# Patient Record
Sex: FEMALE | Race: WHITE | NOT HISPANIC OR LATINO | Employment: OTHER | URBAN - METROPOLITAN AREA
[De-identification: names, ages, dates, MRNs, and addresses within clinical notes are randomized per-mention and may not be internally consistent; named-entity substitution may affect disease eponyms.]

---

## 2017-01-03 ENCOUNTER — APPOINTMENT (OUTPATIENT)
Dept: PHYSICAL THERAPY | Facility: CLINIC | Age: 82
End: 2017-01-03
Payer: MEDICARE

## 2017-01-03 PROCEDURE — 97140 MANUAL THERAPY 1/> REGIONS: CPT

## 2017-01-06 ENCOUNTER — APPOINTMENT (OUTPATIENT)
Dept: PHYSICAL THERAPY | Facility: CLINIC | Age: 82
End: 2017-01-06
Payer: MEDICARE

## 2017-01-06 PROCEDURE — 97140 MANUAL THERAPY 1/> REGIONS: CPT

## 2017-01-10 ENCOUNTER — APPOINTMENT (OUTPATIENT)
Dept: PHYSICAL THERAPY | Facility: CLINIC | Age: 82
End: 2017-01-10
Payer: MEDICARE

## 2017-01-10 ENCOUNTER — ALLSCRIPTS OFFICE VISIT (OUTPATIENT)
Dept: OTHER | Facility: OTHER | Age: 82
End: 2017-01-10

## 2017-01-10 PROCEDURE — 97140 MANUAL THERAPY 1/> REGIONS: CPT

## 2017-01-13 ENCOUNTER — APPOINTMENT (OUTPATIENT)
Dept: PHYSICAL THERAPY | Facility: CLINIC | Age: 82
End: 2017-01-13
Payer: MEDICARE

## 2017-01-13 PROCEDURE — 97140 MANUAL THERAPY 1/> REGIONS: CPT

## 2017-01-17 ENCOUNTER — APPOINTMENT (OUTPATIENT)
Dept: PHYSICAL THERAPY | Facility: CLINIC | Age: 82
End: 2017-01-17
Payer: MEDICARE

## 2017-01-17 PROCEDURE — 97140 MANUAL THERAPY 1/> REGIONS: CPT

## 2017-01-20 ENCOUNTER — APPOINTMENT (OUTPATIENT)
Dept: PHYSICAL THERAPY | Facility: CLINIC | Age: 82
End: 2017-01-20
Payer: MEDICARE

## 2017-01-20 ENCOUNTER — GENERIC CONVERSION - ENCOUNTER (OUTPATIENT)
Dept: OTHER | Facility: OTHER | Age: 82
End: 2017-01-20

## 2017-01-20 PROCEDURE — G8991 OTHER PT/OT GOAL STATUS: HCPCS

## 2017-01-20 PROCEDURE — 97140 MANUAL THERAPY 1/> REGIONS: CPT

## 2017-01-20 PROCEDURE — G8990 OTHER PT/OT CURRENT STATUS: HCPCS

## 2017-01-24 ENCOUNTER — APPOINTMENT (OUTPATIENT)
Dept: PHYSICAL THERAPY | Facility: CLINIC | Age: 82
End: 2017-01-24
Payer: MEDICARE

## 2017-01-24 PROCEDURE — 97140 MANUAL THERAPY 1/> REGIONS: CPT

## 2017-01-27 ENCOUNTER — APPOINTMENT (OUTPATIENT)
Dept: PHYSICAL THERAPY | Facility: CLINIC | Age: 82
End: 2017-01-27
Payer: MEDICARE

## 2017-01-27 PROCEDURE — 97140 MANUAL THERAPY 1/> REGIONS: CPT

## 2017-01-31 ENCOUNTER — APPOINTMENT (OUTPATIENT)
Dept: PHYSICAL THERAPY | Facility: CLINIC | Age: 82
End: 2017-01-31
Payer: MEDICARE

## 2017-01-31 PROCEDURE — 97140 MANUAL THERAPY 1/> REGIONS: CPT

## 2017-02-01 ENCOUNTER — ALLSCRIPTS OFFICE VISIT (OUTPATIENT)
Dept: OTHER | Facility: OTHER | Age: 82
End: 2017-02-01

## 2017-02-03 ENCOUNTER — ANESTHESIA EVENT (OUTPATIENT)
Dept: PERIOP | Facility: HOSPITAL | Age: 82
End: 2017-02-03

## 2017-02-03 ENCOUNTER — APPOINTMENT (EMERGENCY)
Dept: RADIOLOGY | Facility: HOSPITAL | Age: 82
End: 2017-02-03
Payer: MEDICARE

## 2017-02-03 ENCOUNTER — HOSPITAL ENCOUNTER (EMERGENCY)
Facility: HOSPITAL | Age: 82
Discharge: HOME | End: 2017-02-03
Attending: EMERGENCY MEDICINE | Admitting: EMERGENCY MEDICINE
Payer: MEDICARE

## 2017-02-03 ENCOUNTER — ANESTHESIA (OUTPATIENT)
Dept: PERIOP | Facility: HOSPITAL | Age: 82
End: 2017-02-03

## 2017-02-03 VITALS
RESPIRATION RATE: 18 BRPM | HEART RATE: 75 BPM | DIASTOLIC BLOOD PRESSURE: 56 MMHG | OXYGEN SATURATION: 94 % | TEMPERATURE: 99 F | SYSTOLIC BLOOD PRESSURE: 128 MMHG | BODY MASS INDEX: 24.54 KG/M2 | WEIGHT: 125 LBS | HEIGHT: 60 IN

## 2017-02-03 DIAGNOSIS — T18.108A ESOPHAGEAL FOREIGN BODY, INITIAL ENCOUNTER: Primary | ICD-10-CM

## 2017-02-03 LAB
ATRIAL RATE: 86 BPM
P AXIS: 70 DEGREES
PR INTERVAL: 182 MS
QRS AXIS: -83 DEGREES
QRSD INTERVAL: 168 MS
QT INTERVAL: 452 MS
QTC INTERVAL: 540 MS
T WAVE AXIS: 93 DEGREES
VENTRICULAR RATE: 86 BPM

## 2017-02-03 PROCEDURE — 99284 EMERGENCY DEPT VISIT MOD MDM: CPT

## 2017-02-03 PROCEDURE — 96374 THER/PROPH/DIAG INJ IV PUSH: CPT

## 2017-02-03 PROCEDURE — 93005 ELECTROCARDIOGRAM TRACING: CPT | Performed by: EMERGENCY MEDICINE

## 2017-02-03 PROCEDURE — 71020 HB CHEST X-RAY 2VW FRONTAL&LATL: CPT

## 2017-02-03 RX ORDER — PROPOFOL 10 MG/ML
INJECTION, EMULSION INTRAVENOUS AS NEEDED
Status: DISCONTINUED | OUTPATIENT
Start: 2017-02-03 | End: 2017-02-03 | Stop reason: SURG

## 2017-02-03 RX ORDER — SUCCINYLCHOLINE CHLORIDE 20 MG/ML
INJECTION INTRAMUSCULAR; INTRAVENOUS AS NEEDED
Status: DISCONTINUED | OUTPATIENT
Start: 2017-02-03 | End: 2017-02-03 | Stop reason: SURG

## 2017-02-03 RX ORDER — LANOLIN ALCOHOL/MO/W.PET/CERES
CREAM (GRAM) TOPICAL
COMMUNITY

## 2017-02-03 RX ORDER — FENTANYL CITRATE/PF 50 MCG/ML
12.5 SYRINGE (ML) INJECTION
Status: DISCONTINUED | OUTPATIENT
Start: 2017-02-03 | End: 2017-02-03 | Stop reason: HOSPADM

## 2017-02-03 RX ORDER — METOPROLOL TARTRATE 50 MG/1
TABLET, FILM COATED ORAL
COMMUNITY
Start: 2016-09-18 | End: 2018-04-24 | Stop reason: SDUPTHER

## 2017-02-03 RX ORDER — PRAVASTATIN SODIUM 20 MG
TABLET ORAL
COMMUNITY
Start: 2015-03-23 | End: 2018-04-24 | Stop reason: SDUPTHER

## 2017-02-03 RX ORDER — ASPIRIN 81 MG/1
TABLET, CHEWABLE ORAL
COMMUNITY
End: 2018-08-08 | Stop reason: HOSPADM

## 2017-02-03 RX ORDER — ONDANSETRON 2 MG/ML
4 INJECTION INTRAMUSCULAR; INTRAVENOUS ONCE
Status: DISCONTINUED | OUTPATIENT
Start: 2017-02-03 | End: 2017-02-03 | Stop reason: HOSPADM

## 2017-02-03 RX ORDER — SODIUM CHLORIDE, SODIUM LACTATE, POTASSIUM CHLORIDE, CALCIUM CHLORIDE 600; 310; 30; 20 MG/100ML; MG/100ML; MG/100ML; MG/100ML
INJECTION, SOLUTION INTRAVENOUS CONTINUOUS PRN
Status: DISCONTINUED | OUTPATIENT
Start: 2017-02-03 | End: 2017-02-03 | Stop reason: SURG

## 2017-02-03 RX ADMIN — PROPOFOL 50 MG: 10 INJECTION, EMULSION INTRAVENOUS at 05:25

## 2017-02-03 RX ADMIN — PROPOFOL 50 MG: 10 INJECTION, EMULSION INTRAVENOUS at 05:30

## 2017-02-03 RX ADMIN — PROPOFOL 50 MG: 10 INJECTION, EMULSION INTRAVENOUS at 05:40

## 2017-02-03 RX ADMIN — SODIUM CHLORIDE, SODIUM LACTATE, POTASSIUM CHLORIDE, AND CALCIUM CHLORIDE: .6; .31; .03; .02 INJECTION, SOLUTION INTRAVENOUS at 04:50

## 2017-02-03 RX ADMIN — PROPOFOL 50 MG: 10 INJECTION, EMULSION INTRAVENOUS at 05:15

## 2017-02-03 RX ADMIN — SUCCINYLCHOLINE CHLORIDE 60 MG: 20 INJECTION, SOLUTION INTRAMUSCULAR; INTRAVENOUS at 05:15

## 2017-02-03 RX ADMIN — GLUCAGON HYDROCHLORIDE 1 MG: KIT at 02:41

## 2017-02-07 ENCOUNTER — APPOINTMENT (OUTPATIENT)
Dept: PHYSICAL THERAPY | Facility: CLINIC | Age: 82
End: 2017-02-07
Payer: MEDICARE

## 2017-02-07 PROCEDURE — 97140 MANUAL THERAPY 1/> REGIONS: CPT

## 2017-02-10 ENCOUNTER — APPOINTMENT (OUTPATIENT)
Dept: PHYSICAL THERAPY | Facility: CLINIC | Age: 82
End: 2017-02-10
Payer: MEDICARE

## 2017-02-10 PROCEDURE — 97140 MANUAL THERAPY 1/> REGIONS: CPT

## 2017-02-14 ENCOUNTER — GENERIC CONVERSION - ENCOUNTER (OUTPATIENT)
Dept: OTHER | Facility: OTHER | Age: 82
End: 2017-02-14

## 2017-02-14 ENCOUNTER — APPOINTMENT (OUTPATIENT)
Dept: PHYSICAL THERAPY | Facility: CLINIC | Age: 82
End: 2017-02-14
Payer: MEDICARE

## 2017-02-14 PROCEDURE — 97140 MANUAL THERAPY 1/> REGIONS: CPT

## 2017-02-14 PROCEDURE — G8991 OTHER PT/OT GOAL STATUS: HCPCS | Performed by: PHYSICAL THERAPIST

## 2017-02-14 PROCEDURE — G8990 OTHER PT/OT CURRENT STATUS: HCPCS | Performed by: PHYSICAL THERAPIST

## 2017-02-16 ENCOUNTER — APPOINTMENT (OUTPATIENT)
Dept: PHYSICAL THERAPY | Facility: CLINIC | Age: 82
End: 2017-02-16
Payer: MEDICARE

## 2017-02-21 ENCOUNTER — APPOINTMENT (OUTPATIENT)
Dept: PHYSICAL THERAPY | Facility: CLINIC | Age: 82
End: 2017-02-21
Payer: MEDICARE

## 2017-02-24 ENCOUNTER — APPOINTMENT (OUTPATIENT)
Dept: PHYSICAL THERAPY | Facility: CLINIC | Age: 82
End: 2017-02-24
Payer: MEDICARE

## 2017-02-27 ENCOUNTER — ALLSCRIPTS OFFICE VISIT (OUTPATIENT)
Dept: OTHER | Facility: OTHER | Age: 82
End: 2017-02-27

## 2017-02-28 ENCOUNTER — APPOINTMENT (OUTPATIENT)
Dept: PHYSICAL THERAPY | Facility: CLINIC | Age: 82
End: 2017-02-28
Payer: MEDICARE

## 2017-03-13 ENCOUNTER — ALLSCRIPTS OFFICE VISIT (OUTPATIENT)
Dept: OTHER | Facility: OTHER | Age: 82
End: 2017-03-13

## 2017-04-24 ENCOUNTER — APPOINTMENT (OUTPATIENT)
Dept: LAB | Facility: HOSPITAL | Age: 82
End: 2017-04-24
Payer: MEDICARE

## 2017-04-24 ENCOUNTER — TRANSCRIBE ORDERS (OUTPATIENT)
Dept: ADMINISTRATIVE | Facility: HOSPITAL | Age: 82
End: 2017-04-24

## 2017-04-24 DIAGNOSIS — E78.5 OTHER AND UNSPECIFIED HYPERLIPIDEMIA: ICD-10-CM

## 2017-04-24 DIAGNOSIS — E78.5 OTHER AND UNSPECIFIED HYPERLIPIDEMIA: Primary | ICD-10-CM

## 2017-04-24 LAB
ALBUMIN SERPL BCP-MCNC: 3.2 G/DL (ref 3.5–5)
ALP SERPL-CCNC: 60 U/L (ref 46–116)
ALT SERPL W P-5'-P-CCNC: 17 U/L (ref 12–78)
ANION GAP SERPL CALCULATED.3IONS-SCNC: 6 MMOL/L (ref 4–13)
AST SERPL W P-5'-P-CCNC: 27 U/L (ref 5–45)
BILIRUB SERPL-MCNC: 0.4 MG/DL (ref 0.2–1)
BUN SERPL-MCNC: 19 MG/DL (ref 5–25)
CALCIUM SERPL-MCNC: 8.8 MG/DL (ref 8.3–10.1)
CHLORIDE SERPL-SCNC: 101 MMOL/L (ref 100–108)
CHOLEST SERPL-MCNC: 187 MG/DL (ref 50–200)
CO2 SERPL-SCNC: 30 MMOL/L (ref 21–32)
CREAT SERPL-MCNC: 0.95 MG/DL (ref 0.6–1.3)
ERYTHROCYTE [DISTWIDTH] IN BLOOD BY AUTOMATED COUNT: 14.1 % (ref 11.6–15.1)
GFR SERPL CREATININE-BSD FRML MDRD: 55 ML/MIN/1.73SQ M
GLUCOSE P FAST SERPL-MCNC: 97 MG/DL (ref 65–99)
HCT VFR BLD AUTO: 42.4 % (ref 34.8–46.1)
HDLC SERPL-MCNC: 49 MG/DL (ref 40–60)
HGB BLD-MCNC: 13.9 G/DL (ref 11.5–15.4)
LDLC SERPL CALC-MCNC: 94 MG/DL (ref 0–100)
MCH RBC QN AUTO: 29.9 PG (ref 26.8–34.3)
MCHC RBC AUTO-ENTMCNC: 32.8 G/DL (ref 31.4–37.4)
MCV RBC AUTO: 91 FL (ref 82–98)
PLATELET # BLD AUTO: 229 THOUSANDS/UL (ref 149–390)
PMV BLD AUTO: 9.2 FL (ref 8.9–12.7)
POTASSIUM SERPL-SCNC: 3.9 MMOL/L (ref 3.5–5.3)
PROT SERPL-MCNC: 8 G/DL (ref 6.4–8.2)
RBC # BLD AUTO: 4.65 MILLION/UL (ref 3.81–5.12)
SODIUM SERPL-SCNC: 137 MMOL/L (ref 136–145)
TRIGL SERPL-MCNC: 221 MG/DL
TSH SERPL DL<=0.05 MIU/L-ACNC: 1.08 UIU/ML (ref 0.36–3.74)
WBC # BLD AUTO: 7.32 THOUSAND/UL (ref 4.31–10.16)

## 2017-04-24 PROCEDURE — 80053 COMPREHEN METABOLIC PANEL: CPT

## 2017-04-24 PROCEDURE — 85027 COMPLETE CBC AUTOMATED: CPT

## 2017-04-24 PROCEDURE — 84443 ASSAY THYROID STIM HORMONE: CPT

## 2017-04-24 PROCEDURE — 80061 LIPID PANEL: CPT

## 2017-04-24 PROCEDURE — 36415 COLL VENOUS BLD VENIPUNCTURE: CPT

## 2017-05-03 ENCOUNTER — ALLSCRIPTS OFFICE VISIT (OUTPATIENT)
Dept: OTHER | Facility: OTHER | Age: 82
End: 2017-05-03

## 2017-05-24 ENCOUNTER — ALLSCRIPTS OFFICE VISIT (OUTPATIENT)
Dept: OTHER | Facility: OTHER | Age: 82
End: 2017-05-24

## 2017-06-08 ENCOUNTER — ALLSCRIPTS OFFICE VISIT (OUTPATIENT)
Dept: OTHER | Facility: OTHER | Age: 82
End: 2017-06-08

## 2017-07-18 ENCOUNTER — TELEPHONE (OUTPATIENT)
Dept: INPATIENT UNIT | Facility: HOSPITAL | Age: 82
End: 2017-07-18

## 2017-07-19 ENCOUNTER — ANESTHESIA EVENT (OUTPATIENT)
Dept: SURGERY | Facility: HOSPITAL | Age: 82
End: 2017-07-19
Payer: MEDICARE

## 2017-07-19 ENCOUNTER — HOSPITAL ENCOUNTER (OUTPATIENT)
Dept: NON INVASIVE DIAGNOSTICS | Facility: HOSPITAL | Age: 82
Discharge: HOME | End: 2017-07-19
Attending: INTERNAL MEDICINE | Admitting: INTERNAL MEDICINE
Payer: MEDICARE

## 2017-07-19 ENCOUNTER — ANESTHESIA (OUTPATIENT)
Dept: SURGERY | Facility: HOSPITAL | Age: 82
End: 2017-07-19
Payer: MEDICARE

## 2017-07-19 VITALS
RESPIRATION RATE: 18 BRPM | BODY MASS INDEX: 24.54 KG/M2 | WEIGHT: 125 LBS | DIASTOLIC BLOOD PRESSURE: 84 MMHG | TEMPERATURE: 97.4 F | HEART RATE: 61 BPM | OXYGEN SATURATION: 91 % | SYSTOLIC BLOOD PRESSURE: 162 MMHG | HEIGHT: 60 IN

## 2017-07-19 DIAGNOSIS — I45.9 HEART BLOCK: ICD-10-CM

## 2017-07-19 LAB
ANION GAP SERPL CALCULATED.3IONS-SCNC: 4 MMOL/L (ref 4–13)
ATRIAL RATE: 53 BPM
BASOPHILS # BLD AUTO: 0.05 THOUSANDS/ΜL (ref 0–0.1)
BASOPHILS NFR BLD AUTO: 1 % (ref 0–1)
BUN SERPL-MCNC: 21 MG/DL (ref 5–25)
CALCIUM SERPL-MCNC: 8.6 MG/DL (ref 8.3–10.1)
CHLORIDE SERPL-SCNC: 104 MMOL/L (ref 100–108)
CO2 SERPL-SCNC: 32 MMOL/L (ref 21–32)
CREAT SERPL-MCNC: 1.24 MG/DL (ref 0.6–1.3)
EOSINOPHIL # BLD AUTO: 0.53 THOUSAND/ΜL (ref 0–0.61)
EOSINOPHIL NFR BLD AUTO: 8 % (ref 0–6)
ERYTHROCYTE [DISTWIDTH] IN BLOOD BY AUTOMATED COUNT: 14.6 % (ref 11.6–15.1)
GFR SERPL CREATININE-BSD FRML MDRD: 40.5 ML/MIN/1.73SQ M
GLUCOSE P FAST SERPL-MCNC: 75 MG/DL (ref 65–99)
GLUCOSE SERPL-MCNC: 75 MG/DL (ref 65–140)
HCT VFR BLD AUTO: 42.6 % (ref 34.8–46.1)
HGB BLD-MCNC: 14.3 G/DL (ref 11.5–15.4)
LYMPHOCYTES # BLD AUTO: 1.67 THOUSANDS/ΜL (ref 0.6–4.47)
LYMPHOCYTES NFR BLD AUTO: 24 % (ref 14–44)
MAGNESIUM SERPL-MCNC: 2.4 MG/DL (ref 1.6–2.6)
MCH RBC QN AUTO: 30.4 PG (ref 26.8–34.3)
MCHC RBC AUTO-ENTMCNC: 33.6 G/DL (ref 31.4–37.4)
MCV RBC AUTO: 91 FL (ref 82–98)
MONOCYTES # BLD AUTO: 0.72 THOUSAND/ΜL (ref 0.17–1.22)
MONOCYTES NFR BLD AUTO: 10 % (ref 4–12)
NEUTROPHILS # BLD AUTO: 4.08 THOUSANDS/ΜL (ref 1.85–7.62)
NEUTS SEG NFR BLD AUTO: 57 % (ref 43–75)
NRBC BLD AUTO-RTO: 0 /100 WBCS
P AXIS: 45 DEGREES
PLATELET # BLD AUTO: 148 THOUSANDS/UL (ref 149–390)
PMV BLD AUTO: 9.6 FL (ref 8.9–12.7)
POTASSIUM SERPL-SCNC: 4.3 MMOL/L (ref 3.5–5.3)
QRS AXIS: -81 DEGREES
QRSD INTERVAL: 178 MS
QT INTERVAL: 488 MS
QTC INTERVAL: 507 MS
RBC # BLD AUTO: 4.7 MILLION/UL (ref 3.81–5.12)
SODIUM SERPL-SCNC: 140 MMOL/L (ref 136–145)
T WAVE AXIS: 111 DEGREES
VENTRICULAR RATE: 65 BPM
WBC # BLD AUTO: 7.07 THOUSAND/UL (ref 4.31–10.16)

## 2017-07-19 PROCEDURE — 85025 COMPLETE CBC W/AUTO DIFF WBC: CPT | Performed by: PHYSICIAN ASSISTANT

## 2017-07-19 PROCEDURE — 93005 ELECTROCARDIOGRAM TRACING: CPT | Performed by: PHYSICIAN ASSISTANT

## 2017-07-19 PROCEDURE — 33228 REMV&REPLC PM GEN DUAL LEAD: CPT | Performed by: INTERNAL MEDICINE

## 2017-07-19 PROCEDURE — 80048 BASIC METABOLIC PNL TOTAL CA: CPT | Performed by: PHYSICIAN ASSISTANT

## 2017-07-19 PROCEDURE — C1785 PMKR, DUAL, RATE-RESP: HCPCS

## 2017-07-19 PROCEDURE — 83735 ASSAY OF MAGNESIUM: CPT | Performed by: PHYSICIAN ASSISTANT

## 2017-07-19 RX ORDER — LIDOCAINE HYDROCHLORIDE 10 MG/ML
INJECTION, SOLUTION INFILTRATION; PERINEURAL CODE/TRAUMA/SEDATION MEDICATION
Status: COMPLETED | OUTPATIENT
Start: 2017-07-19 | End: 2017-07-19

## 2017-07-19 RX ORDER — SODIUM CHLORIDE 9 MG/ML
INJECTION, SOLUTION INTRAVENOUS CONTINUOUS PRN
Status: DISCONTINUED | OUTPATIENT
Start: 2017-07-19 | End: 2017-07-19 | Stop reason: SURG

## 2017-07-19 RX ORDER — PROPOFOL 10 MG/ML
INJECTION, EMULSION INTRAVENOUS CONTINUOUS PRN
Status: DISCONTINUED | OUTPATIENT
Start: 2017-07-19 | End: 2017-07-19 | Stop reason: SURG

## 2017-07-19 RX ORDER — ACETAMINOPHEN 325 MG/1
650 TABLET ORAL EVERY 6 HOURS PRN
Status: DISCONTINUED | OUTPATIENT
Start: 2017-07-19 | End: 2017-07-19 | Stop reason: HOSPADM

## 2017-07-19 RX ORDER — AMLODIPINE BESYLATE 2.5 MG/1
2.5 TABLET ORAL ONCE AS NEEDED
Status: DISCONTINUED | OUTPATIENT
Start: 2017-07-19 | End: 2017-07-19 | Stop reason: HOSPADM

## 2017-07-19 RX ORDER — GENTAMICIN SULFATE 40 MG/ML
INJECTION, SOLUTION INTRAMUSCULAR; INTRAVENOUS CODE/TRAUMA/SEDATION MEDICATION
Status: COMPLETED | OUTPATIENT
Start: 2017-07-19 | End: 2017-07-19

## 2017-07-19 RX ADMIN — PROPOFOL 60 MCG/KG/MIN: 10 INJECTION, EMULSION INTRAVENOUS at 10:35

## 2017-07-19 RX ADMIN — GENTAMICIN SULFATE 80 MG: 40 INJECTION, SOLUTION INTRAMUSCULAR; INTRAVENOUS at 11:14

## 2017-07-19 RX ADMIN — CEFAZOLIN SODIUM 1000 MG: 1 SOLUTION INTRAVENOUS at 10:37

## 2017-07-19 RX ADMIN — ACETAMINOPHEN 650 MG: 325 TABLET, FILM COATED ORAL at 15:03

## 2017-07-19 RX ADMIN — LIDOCAINE HYDROCHLORIDE 13 ML: 10 INJECTION, SOLUTION INFILTRATION; PERINEURAL at 11:00

## 2017-07-19 RX ADMIN — SODIUM CHLORIDE: 0.9 INJECTION, SOLUTION INTRAVENOUS at 10:20

## 2017-08-04 ENCOUNTER — ALLSCRIPTS OFFICE VISIT (OUTPATIENT)
Dept: OTHER | Facility: OTHER | Age: 82
End: 2017-08-04

## 2017-08-31 ENCOUNTER — ALLSCRIPTS OFFICE VISIT (OUTPATIENT)
Dept: OTHER | Facility: OTHER | Age: 82
End: 2017-08-31

## 2017-09-05 ENCOUNTER — ALLSCRIPTS OFFICE VISIT (OUTPATIENT)
Dept: OTHER | Facility: OTHER | Age: 82
End: 2017-09-05

## 2017-11-07 ENCOUNTER — ALLSCRIPTS OFFICE VISIT (OUTPATIENT)
Dept: OTHER | Facility: OTHER | Age: 82
End: 2017-11-07

## 2017-11-20 NOTE — PROGRESS NOTES
Assessment    1  Malignant neoplasm of upper-outer quadrant of right breast in female, estrogen receptor positive (174 4,V86 0) (C50 411,Z17 0)   2  Lymphedema (457 1) (I89 0)   3  Onychomycosis (110 1) (B35 1)   4  Personal history of breast cancer (V10 3) (Z85 3)   5  History of radiation therapy (V15 3) (Z92 3)   6  History of Use of anastrozole (Arimidex) (V07 52) (Z79 811)    Plan   Malignant neoplasm of upper-outer quadrant of right breast in female, estrogen receptorpositive    · Follow-up visit in 1 year Evaluation and Treatment  Follow-up  Status: Hold For -Scheduling  Requested for: 23Fjv1805   Ordered;Malignant neoplasm of upper-outer quadrant of right breast in female, estrogen receptor positive; Ordered By: Queen Stephanie Performed:  Due: 60WOL7462  Malignant neoplasm of upper-outer quadrant of right breast in female, estrogen receptor positive (174 4) (C50 411)       Discussion/Summary  79 yo female s/p right breast conservation  She does have lymphedema  CYNTHIA based on exam today  She declines further mammography  I will see her on an annual basis or sooner should the need arise  Chief Complaint  Chief Complaint Free Text Note Form: Pt is here for her 8 month breast CA follow-up  She has completed her lymphedema therapy and is now using a sleeve and compression pump at home  This has been helpful, she does experience some mild discomfort in her right axilla and shoulder  She did not have her mammogram done as she recently had a new pacemaker placed and her chest is tender  recent imaging  Aly Bergeron  History of Present Illness  Diagnosis and Staging: right breast ILCHER2-   Treatment History: 11/13/09 right lumpectomy, SLNB, ALNDx 5 years   Current Therapy: none   Disease Status: cynthia   Interval History: lymphedema      Review of Systems  Complete Female ROS SurgOnc:  Constitutional: The patient denies new or recent history of general fatigue, no recent weight loss, no change in appetite    Eyes: eyesight problems  ENT: hearing loss, but-- no tinnitus-- and-- no new masses in head, oral cavity, or neck  Cardiovascular: No complaints of chest pain, no palpitations, no ankle edema  Respiratory: No complaints of shortness of breath, no cough  Gastrointestinal: nausea-- and-- diarrhea  Genitourinary: No complaints of dysuria, no hematuria, no nocturia, no frequent urination, no urethral discharge  Musculoskeletal: limb swelling  Integumentary: No complaints of rash, no new lesions  Neurological: No complaints of convulsions, no seizures, no dizziness  Hematologic/Lymphatic: No complaints of easy bruising  Active Problems     1  Adjustment disorder with anxiety (309 24) (F43 22)   2  Complete heart block (426 0) (I44 2)   3  GERD without esophagitis (530 81) (K21 9)   4  Heart block (426 9) (I45 9)   5  Impingement syndrome of left shoulder (726 2) (M75 42)   6  Inguinal hernia, right (550 90) (K40 90)   7  Lymphedema (457 1) (I89 0)   8  Onychomycosis (110 1) (B35 1)   9  Personal history of breast cancer (V10 3) (Z85 3)   10  Presence of cardiac pacemaker (V45 01) (Z95 0)   11  Pruritus (698 9) (L29 9)   12  Urgency of urination (788 63) (R39 15)  Hypertension (401 9) (I10)     Hyperlipidemia (272 4) (E78 5)     Osteoporosis (733 00) (M81 0)     Hyperglycemia (790 29) (R73 9)       Past Medical History  1  History of hearing loss (V12 49) (Z86 69)   2  History of heart block (V12 59) (Z86 79)   3  History of herpes zoster (V12 09) (Z86 19)   4  History of insomnia (V13 89) (Z87 898)   5  History of malignant neoplasm of cervix (V10 41) (Z85 41)   6  History of radiation therapy (V15 3) (Z92 3)   7  History of temporal arteritis (V12 59) (Z87 39)   8  History of Lyme disease (088 81) (A69 20)   9  History of Pain in joint of right shoulder region (719 41) (M25 511)   10  Personal history of breast cancer (V10 3) (Z85 3)   11   History of Use of anastrozole (Arimidex) (V07 52) (E77 244)    Surgical History  1  History of Axillary Lymphadenectomy   · 11/13/09   2  History of Biopsy Breast Percutaneous Needle Core   · 10/05/09   3  History of Cataract Surgery   4  History of Hysterectomy   5  History of Inguinal Hernia Repair   · OPEN REPAIR OF RIGHT INGUINAL HERNIA WITH MESH   6  History of Knee Surgery   7  History of Pacemaker Permanent Placement Dual-Chamber   8  History of Pacemaker Placement   9  History of Right Breast Lumpectomy   · 11/13/09   10  History of [place] Lymph Node Biopsy   · 11/13/09  Surgical History Reviewed: The surgical history was reviewed and updated today  Family History  Mother    1  Denied: Family history of malignant neoplasm   2  Family history of Heart Disease (V17 49)  Father    3  Denied: Family history of malignant neoplasm   4  Family history of Heart Disease (V17 49)  Family History Reviewed: The family history was reviewed and updated today  Social History     · Denied: History of Alcohol Use (History)   · Former smoker (V15 82) (N82 418)   · Marital History -    · Non-smoker (V49 89) (Z78 9)  Social History Reviewed: The social history was reviewed and updated today  The social history was reviewed and is unchanged  Current Meds   1  Aspirin 81 MG TABS; Take 1 tablet daily; Therapy: (Recorded:07Nov2014) to Recorded   2  Calcium + D TABS; TAKE 1 TABLET TWICE DAILY as directed; Therapy: (Recorded:07Nov2014) to Recorded   3  Centrum Silver TABS; Take 1 tablet daily; Therapy: (Recorded:07Nov2014) to Recorded   4  Fish Oil CAPS; Take as directed; Therapy: (Recorded:16Jun2015) to Recorded   5  HydrOXYzine HCl - 25 MG Oral Tablet; TAKE 1/2 TO 1 TABLET EVERY 6 HOURS IF NEEDED; Therapy: 12TIE5965 to (Evaluate:00Xkc1223)  Requested for: 35SAM4708; Last Rx:46Sfd6663 Ordered   6  LORazepam 0 5 MG Oral Tablet; take 1 tablet by mouth every 6 hours if needed for anxiety; Therapy: 34NQT9661 to (Evaluate:13Jun2017); Last Rx:06Jun2017 Ordered   7   Metoprolol Tartrate 50 MG Oral Tablet; Take 1 tablet twice daily; Therapy: 39AXI0137 to (Evaluate:12Apr2018)  Requested for: 68Ipd8065; Last Rx:94Eid6636 Ordered   8  Nystatin 419168 UNIT/ML Mouth/Throat Suspension; SWISH AND SWALLOW 10 ML UP TO 5 TIMES DAILY; Therapy: 05TSC5509 to (Evaluate:93Eab7627)  Requested for: 71Xqf7526; Last Rx:13Ekj1477 Ordered   9  Pravastatin Sodium 20 MG Oral Tablet; TAKE 1 TABLET DAILY AT BEDTIME; Therapy: 84GPE4637 to 452 8137)  Requested for: 39Gri7839; Last Rx:55Hbj6665 Ordered  Medication List Reviewed: The medication list was reviewed and updated today  Allergies  1  No Known Drug Allergies    Vitals  Vital Signs    Recorded: 31Aug2017 10:17AM   Temperature 98 02 F   Heart Rate 77   Respiration 14   Systolic 943   Diastolic 74   Height 5 ft    Weight 118 lb    BMI Calculated 23 05   BSA Calculated 1 49   O2 Saturation 94       Physical Exam   Constitutional: General appearance: The Patient is well-developed, well-nourished female who appears her stated age in no acute distress  She is pleasant and talkative  Chest: The lungs are clear to auscultation  Cardiac: Heart is regular rate  Abdomen: There is no evidence of hepatosplenomegaly  Extremities: Examination of extremities for edema and/or varicosities: Abnormal  -- lymphedema right arm  Neuro: Grossly nonfocal  -- Orientation to person, place and time: Normal  -- Mood and affect: Normal    Lymphatic: no evidence of cervical adenopathy bilaterally  -- no evidence of axillary adenopathy bilaterally  Skin: Examination of Breast: Abnormal   Breast: Examination of the right breast revealed a lumpectomy , but-- no erythema,-- no swelling-- and-- no tenderness  Examination of the left breast revealed no erythema,-- no swelling-- and-- no tenderness  Nipples appeared normal No discharge was noted from the nipples  No breast masses were palpable  Axillary nodes: no enlarged nodes        Results/Data  Diagnostic Studies Reviewed Surg Onc:  X-ray Review 8/15/16 bilateral screening mammogram hzifga93/1/16 duplex right arm negative  Future Appointments    Date/Time Provider Specialty Site   02/07/2018 02:00 PM Cardiology, Device Remote   [address]   05/08/2018 08:30 AM Cardiology, Device Remote   [address]   09/06/2018 09:45 AM FLORINDA Grace  Surgical Oncology [address] [facility]   01/08/2018 10:00 AM Jessica King MD Family Medicine Torey Boyle MD     End of Encounter Meds    1  LORazepam 0 5 MG Oral Tablet; take 1 tablet by mouth every 6 hours if needed for anxiety; Therapy: 24OOL1551 to (Evaluate:13Jun2017); Last Rx:06Jun2017 Ordered    2  Aspirin 81 MG TABS; Take 1 tablet daily; Therapy: (Recorded:07Nov2014) to Recorded   3  Centrum Silver TABS; Take 1 tablet daily; Therapy: (Recorded:07Nov2014) to Recorded   4  Fish Oil CAPS; Take as directed; Therapy: (Recorded:16Jun2015) to Recorded    5  Pravastatin Sodium 20 MG Oral Tablet; TAKE 1 TABLET DAILY AT BEDTIME; Therapy: 69PVV9425 to (Evaluate:12Apr2018)  Requested for: 05Dxa3142; Last Rx:29Doh6920 Ordered    6  Metoprolol Tartrate 50 MG Oral Tablet; Take 1 tablet twice daily; Therapy: 55ZPC3677 to (Evaluate:12Apr2018)  Requested for: 24Qhx0221; Last Rx:01Bmt0194 Ordered    7  Calcium + D TABS; TAKE 1 TABLET TWICE DAILY as directed; Therapy: (Recorded:07Nov2014) to Recorded    8  Nystatin 685297 UNIT/ML Mouth/Throat Suspension; SWISH AND SWALLOW 10 ML UP TO 5 TIMES DAILY; Therapy: 85XKS5947 to (Evaluate:50Nfb2248)  Requested for: 59Oel5936; Last Rx:73Wlt4326 Ordered    9  HydrOXYzine HCl - 25 MG Oral Tablet; TAKE 1/2 TO 1 TABLET EVERY 6 HOURS IF NEEDED; Therapy: 24MTL3958 to (Evaluate:32Tmv7378)  Requested for: 31MEL0035; Last Rx:05Cyo3137 Ordered    10  Ciclopirox Olamine 0 77 % External Cream; APPLY  AND RUB  IN A THIN FILM TO  AFFECTED AREAS TWICE DAILY  (AM AND PM);   Therapy: 72ALT2534 to (Last Rx: 19NDX1118)  Requested for: 17CFC5816 Ordered    Signatures   Electronically signed by : FLORINDA Corey ; Nov 19 2017  5:26PM EST                       (Author)

## 2017-12-26 ENCOUNTER — TRANSCRIBE ORDERS (OUTPATIENT)
Dept: ADMINISTRATIVE | Facility: HOSPITAL | Age: 82
End: 2017-12-26

## 2017-12-26 ENCOUNTER — APPOINTMENT (OUTPATIENT)
Dept: LAB | Facility: HOSPITAL | Age: 82
End: 2017-12-26
Payer: MEDICARE

## 2017-12-26 DIAGNOSIS — E78.5 HYPERLIPIDEMIA, UNSPECIFIED HYPERLIPIDEMIA TYPE: ICD-10-CM

## 2017-12-26 DIAGNOSIS — E78.5 HYPERLIPIDEMIA, UNSPECIFIED HYPERLIPIDEMIA TYPE: Primary | ICD-10-CM

## 2017-12-26 LAB
25(OH)D3 SERPL-MCNC: 33.5 NG/ML (ref 30–100)
ALBUMIN SERPL BCP-MCNC: 3.7 G/DL (ref 3.5–5)
ALP SERPL-CCNC: 50 U/L (ref 46–116)
ALT SERPL W P-5'-P-CCNC: 24 U/L (ref 12–78)
ANION GAP SERPL CALCULATED.3IONS-SCNC: 6 MMOL/L (ref 4–13)
AST SERPL W P-5'-P-CCNC: 44 U/L (ref 5–45)
BASOPHILS # BLD AUTO: 0.04 THOUSANDS/ΜL (ref 0–0.1)
BASOPHILS NFR BLD AUTO: 1 % (ref 0–1)
BILIRUB SERPL-MCNC: 0.5 MG/DL (ref 0.2–1)
BUN SERPL-MCNC: 24 MG/DL (ref 5–25)
CALCIUM SERPL-MCNC: 8.7 MG/DL (ref 8.3–10.1)
CHLORIDE SERPL-SCNC: 102 MMOL/L (ref 100–108)
CHOLEST SERPL-MCNC: 222 MG/DL (ref 50–200)
CO2 SERPL-SCNC: 31 MMOL/L (ref 21–32)
CREAT SERPL-MCNC: 1.07 MG/DL (ref 0.6–1.3)
EOSINOPHIL # BLD AUTO: 0.75 THOUSAND/ΜL (ref 0–0.61)
EOSINOPHIL NFR BLD AUTO: 9 % (ref 0–6)
ERYTHROCYTE [DISTWIDTH] IN BLOOD BY AUTOMATED COUNT: 14 % (ref 11.6–15.1)
GFR SERPL CREATININE-BSD FRML MDRD: 45 ML/MIN/1.73SQ M
GLUCOSE P FAST SERPL-MCNC: 99 MG/DL (ref 65–99)
HCT VFR BLD AUTO: 43.1 % (ref 34.8–46.1)
HDLC SERPL-MCNC: 54 MG/DL (ref 40–60)
HGB BLD-MCNC: 14.2 G/DL (ref 11.5–15.4)
LDLC SERPL CALC-MCNC: 109 MG/DL (ref 0–100)
LYMPHOCYTES # BLD AUTO: 2.16 THOUSANDS/ΜL (ref 0.6–4.47)
LYMPHOCYTES NFR BLD AUTO: 26 % (ref 14–44)
MCH RBC QN AUTO: 30.2 PG (ref 26.8–34.3)
MCHC RBC AUTO-ENTMCNC: 32.9 G/DL (ref 31.4–37.4)
MCV RBC AUTO: 92 FL (ref 82–98)
MONOCYTES # BLD AUTO: 0.91 THOUSAND/ΜL (ref 0.17–1.22)
MONOCYTES NFR BLD AUTO: 11 % (ref 4–12)
NEUTROPHILS # BLD AUTO: 4.46 THOUSANDS/ΜL (ref 1.85–7.62)
NEUTS SEG NFR BLD AUTO: 53 % (ref 43–75)
PLATELET # BLD AUTO: 202 THOUSANDS/UL (ref 149–390)
PMV BLD AUTO: 8.9 FL (ref 8.9–12.7)
POTASSIUM SERPL-SCNC: 3.8 MMOL/L (ref 3.5–5.3)
PROT SERPL-MCNC: 8 G/DL (ref 6.4–8.2)
RBC # BLD AUTO: 4.7 MILLION/UL (ref 3.81–5.12)
SODIUM SERPL-SCNC: 139 MMOL/L (ref 136–145)
TRIGL SERPL-MCNC: 295 MG/DL
WBC # BLD AUTO: 8.32 THOUSAND/UL (ref 4.31–10.16)

## 2017-12-26 PROCEDURE — 82306 VITAMIN D 25 HYDROXY: CPT

## 2017-12-26 PROCEDURE — 36415 COLL VENOUS BLD VENIPUNCTURE: CPT

## 2017-12-26 PROCEDURE — 80061 LIPID PANEL: CPT

## 2017-12-26 PROCEDURE — 80053 COMPREHEN METABOLIC PANEL: CPT

## 2017-12-26 PROCEDURE — 85025 COMPLETE CBC W/AUTO DIFF WBC: CPT

## 2018-01-08 ENCOUNTER — ALLSCRIPTS OFFICE VISIT (OUTPATIENT)
Dept: OTHER | Facility: OTHER | Age: 83
End: 2018-01-08

## 2018-01-12 VITALS
DIASTOLIC BLOOD PRESSURE: 80 MMHG | TEMPERATURE: 97 F | HEIGHT: 60 IN | WEIGHT: 125.22 LBS | SYSTOLIC BLOOD PRESSURE: 140 MMHG | HEART RATE: 62 BPM | BODY MASS INDEX: 24.58 KG/M2

## 2018-01-12 NOTE — PROGRESS NOTES
Assessment    1  Encounter for preventive health examination (V70 0) (Z00 00)    Plan  Health Maintenance    · Eat a low fat and low cholesterol diet ; Status:Complete;   Done: 33FQR7141   · Follow-up visit in 3 months Evaluation and Treatment  Follow-up  Status: Hold For -  Scheduling  Requested for: 70Gxw2637    Discussion/Summary  Impression: Subsequent Annual Wellness Visit, with preventive exam as well as age and risk appropriate counseling completed  Cardiovascular screening and counseling: screening is current  Diabetes screening and counseling: screening is current  Colorectal cancer screening and counseling: screening not indicated  Breast cancer screening and counseling: screening is current  Cervical cancer screening and counseling: screening not indicated  Osteoporosis screening and counseling: screening is current  Abdominal aortic aneurysm screening and counseling: screening not indicated  Glaucoma screening and counseling: screening is current  HIV screening and counseling: screening not indicated  Immunizations: influenza vaccine is up to date this year, pneumococcal vaccine due today, hepatitis B vaccination series is not indicated at this time due to the patient's low risk of efrain the disease, the patient declines the Zostavax vaccine and Td vaccination up to date  Advance Directive Planning: complete and up to date  Advice and education were given regarding fall risk reduction  She was referred to none  Medical Equipment/Suppliers: none  Patient Discussion: plan discussed with the patient, follow-up visit needed in 4 months  Chief Complaint  HM      Advance Directives  Advance Directive [facility]:   YES - Patient has an advance health care directive  The patient has a living will located  a scanned copy is in the patient's chart  Capacity/Competence:  This patient has full decision making capacity for discussion of advance care planning and This patient has full decision making competency for discussion of advance care planning  History of Present Illness  HPI: Tired all the time  Some some SOB at times  Foot fungus seen by Podiatry  Welcome to [employer] and Wellness Visits: The patient is being seen for the subsequent annual wellness visit  Medicare Screening and Risk Factors   Hospitalizations: she has been previously hospitalizied and she has been hospitalized 3 times  Once per lifetime medicare screening tests: ECG ~U() and AAA screening US has not yet been done  Medicare Screening Tests Risk Questions   Abdominal aortic aneurysm risk assessment: none indicated  Osteoporosis risk assessment: , female gender and over 48years of age  HIV risk assessment: none indicated  Drug and Alcohol Use: The patient is a former cigarette smoker  The patient reports never drinking alcohol  She has never used illicit drugs  Diet and Physical Activity: Current diet includes well balanced meals  She exercises infrequently  Exercise: walking  Mood Disorder and Cognitive Impairment Screening:   Depression screening  mild to moderate symptoms  She denies feeling down, depressed, or hopeless over the past two weeks  She denies feeling little interest or pleasure in doing things over the past two weeks  Cognitive impairment screening: denies difficulty learning/retaining new information, denies difficulty handling complex tasks, denies difficulty with reasoning, denies difficulty with spatial ability and orientation, denies difficulty with language and denies difficulty with behavior  Functional Ability/Level of Safety: Hearing is a hearing aid is used  She reports hearing difficulties  The patient is currently able to do activities of daily living without limitations, able to do instrumental activities of daily living without limitations, able to participate in social activities without limitations and able to drive without limitations  Activities of daily living details: does not need help using the phone, no transportation help needed, does not need help shopping, no meal preparation help needed, does not need help doing housework, does not need help doing laundry, does not need help managing medications and does not need help managing money  Fall risk factors:  antihypertensive use and optimize BP control, but no polypharmacy, no alcohol use, no mobility impairment, no antidepressant use, no deconditioning, no postural hypotension, no sedative use, no visual impairment, no urinary incontinence, no cognitive impairment, up and go test was normal and no previous fall  Home safety risk factors:  no unfamiliar surroundings, no loose rugs, no poor household lighting, no uneven floors, no household clutter, grab bars in the bathroom and handrails on the stairs  Advance Directives: Advance directives: living will, durable power of  for health care directives and advance directives  end of life decisions were not reviewed with the patient  Co-Managers and Medical Equipment/Suppliers: See Patient Care Team       Reviewed Updated [facility]:   Last Medicare Wellness Visit Information was reviewed, patient interviewed and updates made to the record today  Patient Care Team    Care Team Member Role Specialty Office Number   [address]  Cardiology [phone]   Enrico Fowler MD  Hematology Oncology [phone]   Damon Hickman MD  Radiation Oncology [phone]   Eloy Dotson MD  Family Medicine [phone]   June Boston MD  Surgical Oncology [phone] [phone]     Review of Systems    Constitutional: no fever and no malaise  Cardiovascular: no chest pain and no palpitations  Respiratory: no wheezing  Gastrointestinal: no abdominal pain  Musculoskeletal: no diffuse joint pain  Endocrine: no generalized weakness     Hematologic and Lymphatic: a tendency for easy bruising  Active Problems    1  Breast cancer screening, high risk patient (V76 11) (Z12 39)   2  Cervical cancer (180 9) (C53 9)   3  Chronic GERD (530 81) (K21 9)   4  Eustachian tube disorder, right (381 9) (H69 91)   5  Flu vaccine need (V04 81) (Z23)   6  Heart block (426 9) (I45 9)   7  Hyperlipidemia (272 4) (E78 5)   8  Hypertension (401 9) (I10)   9  Impingement syndrome of left shoulder (726 2) (M75 42)   10  Inguinal hernia, right (550 90) (K40 90)   11  Oral thrush (112 0) (B37 0)   12  Osteoporosis (733 00) (M81 0)   13  Personal history of breast cancer (V10 3) (Z85 3)   14  Skin rash (782 1) (R21)    Past Medical History    · History of Age At First Period 15 Years Old (Menarche)   · History of Age At First Pregnancy 25 Years Old   · History of Breast cancer screening, high risk patient (V76 11) (Z12 39)   · History of Encounter for removal of sutures (V58 32) (Z48 02)   · History of glossitis (V12 79) (Z87 19)   · History of heart block (V12 59) (Z86 79)   · History of herpes zoster (V12 09) (Z86 19)   · History of insomnia (V13 89) (Z87 898)   · History of temporal arteritis (V12 59) (Z87 39)   · History of Intertrigo (695 89) (L30 4)   · History of Lyme disease (088 81) (A69 20)   · History of Need for vaccination with 13-polyvalent pneumococcal conjugate vaccine  (V03 82) (Z23)   · History of Other muscle spasm (728 85) (S78 010)   · History of Pain in joint of right shoulder region (719 41) (M25 511)   · Personal history of breast cancer (V10 3) (Z85 3)   · History of Preop cardiovascular exam (V72 81) (Z01 810)   · History of Rhinitis (472 0) (J31 0)   · History of Skin Rash Generalized   · History of Staggering gait (781 2) (R26 0)   · History of Tick bite (919 4,E906 4) (W57 XXXA)   · Urinary tract infection (599 0) (N39 0)   · History of Visit for pre-operative examination (V74 84) (G30 557)    The active problems and past medical history were reviewed and updated today        Surgical History    · History of Axillary Lymphadenectomy   · History of Cataract Surgery   · History of Hysterectomy   · History of Inguinal Hernia Repair   · History of Knee Surgery   · History of Pacemaker Placement   · History of Permanent Pacemaker Type Dual-Chamber   · History of Right Breast Lumpectomy   · History of [place] Lymph Node Biopsy    The surgical history was reviewed and updated today  Family History  Mother    · Family history of Heart Disease (V17 49)  Father    · Family history of Heart Disease (V17 49)    The family history was reviewed and updated today  Social History    · Denied: History of Alcohol Use (History)   · Former smoker (V15 82) (G00 513)   · Marital History -    · Non-smoker (V49 89) (Z78 9)  The social history was reviewed and updated today  Current Meds   1  Aspirin 81 MG TABS; Take 1 tablet daily; Therapy: (Recorded:07Nov2014) to Recorded   2  Calcium + D TABS; TAKE 1 TABLET TWICE DAILY as directed; Therapy: (Recorded:07Nov2014) to Recorded   3  Centrum Silver TABS; Take 1 tablet daily; Therapy: (Recorded:07Nov2014) to Recorded   4  Fish Oil CAPS; Take as directed; Therapy: (Recorded:16Jun2015) to Recorded   5  Fluticasone Propionate 50 MCG/ACT Nasal Suspension; USE 2 SPRAYS IN EACH   NOSTRIL ONCE DAILY; Therapy: 96XUH4814 to (Evaluate:19Yst2851)  Requested for: 60HBB3320; Last   Rx:03Nov2015 Ordered   6  HydrOXYzine HCl - 25 MG Oral Tablet; TAKE 1/2 OR 1 TABLET AT BEDTIME AS   NEEDED; Therapy: 38PBH4523 to [phone])  Requested for: 98Afq9907; Last   Rx:64Xuy4907 Ordered   7  Metoprolol Tartrate 50 MG Oral Tablet; take one tablet by mouth twice daily; Therapy: 73ZGF1964 to (Evaluate:18Oct2016)  Requested for: 21Apr2016; Last   Rx:21Apr2016 Ordered   8  Nystatin 315657 UNIT/ML Mouth/Throat Suspension; SWISH AND SWALLOW 10 ML UP   TO 5 TIMES DAILY; Therapy: 84QTU8146 to (Evaluate:59Fyj9928)  Requested for: 16Lnq9548;  Last   Rx:75Zht4712 Ordered   9  Pravastatin Sodium 20 MG Oral Tablet; TAKE 1 TABLET DAILY AT BEDTIME; Therapy: 77YGI8463 to (Evaluate:11Jan2017)  Requested for: 25HRL2227; Last   Rx:17Jan2016 Ordered    The medication list was reviewed and updated today  Allergies    1  No Known Drug Allergies    Immunizations   1 2 3    Fluzone High-Dose 0 5 ML Intramuscular Suspension Prefilled Syringe  03-Nov-2015      Influenza  05-Nov-2012 17-Oct-2013 10-Sep-2014    PCV  21-Jul-2015      PPSV  29-Jul-1996      Td/DT  04-Jun-2012       Vitals  Signs    Systolic: 693, LUE, Sitting  Diastolic: 60, LUE, Sitting  Heart Rate: 64, L Radial  Pulse Quality: Regular  Temperature: 98 3 F, Tympanic  Height: 4 ft 11 in  Weight: 120 lb 12 8 oz  BMI Calculated: 24 4  BSA Calculated: 1 49    Physical Exam    Constitutional   General appearance: No acute distress, well appearing and well nourished  Pulmonary   Respiratory effort: No increased work of breathing or signs of respiratory distress  Auscultation of lungs: Clear to auscultation  Cardiovascular   Auscultation of heart: Normal rate and rhythm, normal S1 and S2, no murmurs  Carotid pulses: 2+ bilaterally  Examination of extremities for edema and/or varicosities: Normal     Musculoskeletal   Gait and station: Normal        Procedure    Procedure: Visual Acuity Test    Indication: routine screening  Inforrmation supplied by a Snellen chart  Results: 20/25 in the right eye without corrective device, 20/25 in the left eye without corrective device      Future Appointments    Date/Time Provider Specialty Site   08/05/2016 01:00 PM FLORINDA Ordoñez  Cardiology [facility]   08/24/2016 09:00 AM Cardiology, Device Remote   [address]   11/29/2016 01:00 PM Cardiology, Device Remote   [address]   08/30/2016 09:30 AM FLORINDA Rizvi   Surgical Oncology CANCER CARE [facility] SURGICAL ONCOLOGY     Signatures   Electronically signed by : Wade Hong MD; Jul 29 2016  9:20AM EST                       (Author)

## 2018-01-13 VITALS
HEART RATE: 65 BPM | BODY MASS INDEX: 25 KG/M2 | SYSTOLIC BLOOD PRESSURE: 120 MMHG | WEIGHT: 128 LBS | DIASTOLIC BLOOD PRESSURE: 62 MMHG

## 2018-01-13 VITALS
HEART RATE: 76 BPM | HEIGHT: 60 IN | BODY MASS INDEX: 23.44 KG/M2 | SYSTOLIC BLOOD PRESSURE: 132 MMHG | WEIGHT: 119.4 LBS | DIASTOLIC BLOOD PRESSURE: 80 MMHG | TEMPERATURE: 97.7 F

## 2018-01-14 VITALS
WEIGHT: 127 LBS | DIASTOLIC BLOOD PRESSURE: 60 MMHG | TEMPERATURE: 99.2 F | BODY MASS INDEX: 24.94 KG/M2 | SYSTOLIC BLOOD PRESSURE: 140 MMHG | HEART RATE: 64 BPM | HEIGHT: 60 IN

## 2018-01-14 VITALS
HEIGHT: 60 IN | WEIGHT: 118 LBS | SYSTOLIC BLOOD PRESSURE: 128 MMHG | HEART RATE: 77 BPM | RESPIRATION RATE: 14 BRPM | TEMPERATURE: 98 F | BODY MASS INDEX: 23.16 KG/M2 | DIASTOLIC BLOOD PRESSURE: 74 MMHG | OXYGEN SATURATION: 94 %

## 2018-01-14 VITALS
HEART RATE: 65 BPM | SYSTOLIC BLOOD PRESSURE: 140 MMHG | DIASTOLIC BLOOD PRESSURE: 80 MMHG | WEIGHT: 124 LBS | BODY MASS INDEX: 24.22 KG/M2

## 2018-01-14 VITALS
DIASTOLIC BLOOD PRESSURE: 78 MMHG | BODY MASS INDEX: 24.54 KG/M2 | HEIGHT: 60 IN | HEART RATE: 68 BPM | SYSTOLIC BLOOD PRESSURE: 166 MMHG | WEIGHT: 125 LBS | TEMPERATURE: 98.2 F

## 2018-01-14 NOTE — PROGRESS NOTES
Assessment    1  Encounter for preventive health examination (V70 0) (Z00 00)   2  Hypertension (401 9) (I10)   3  Hyperlipidemia (272 4) (E78 5)   4  Hyperglycemia (790 29) (R73 9)   5  Malignant neoplasm of upper-outer quadrant of right breast in female, estrogen receptor   positive (174 4,V86 0) (C50 411,Z17 0)   6  Osteoporosis (733 00) (M81 0)   7  Tinea pedis of both feet (110 4) (B35 3)    Plan  Health Maintenance    · *VB - Fall Risk Assessment  (Dx Z13 89 Screen for Neurologic Disorder);  Status:Complete;   Done: 83BKT5098 12:00AM   · *VB - Urinary Incontinence Screen (Dx Z13 89 Screen for UI); Status:Complete;   Done:  79RVB6605 10:06AM   · Stretch and warm up your muscles during the first 10 minutes , then cool down your  muscles for the last 10 minutes of exercise ; Status:Complete;   Done: 44VII5067   · The plan of care for urinary incontinence is detailed in the plan and/or discussion section  of today's note ; Status:Complete;   Done: 78KMF6004   · These are things you can do to prevent falls in and around the home ; Status:Complete;    Done: 92PAC3367   · We recommend that you create an advance directive ; Status:Complete;   Done:  19IEB5576  Hyperlipidemia    · (1) CBC/PLT/DIFF; Status:Hold For - Exact Date; Requested for:Approx 56RJA4450;    · (1) COMPREHENSIVE METABOLIC PANEL; Status:Hold For - Exact Date; Requested  for:Approx 36UHL6062;    · (1) LIPID PANEL, FASTING; Status:Hold For - Exact Date; Requested for:Approx  55XIS2246;   Need for influenza vaccination    · Fluzone High-Dose 0 5 ML Intramuscular Suspension Prefilled Syringe  Osteoporosis    · (1) VITAMIN D 25-HYDROXY; Status:Hold For - Exact Date; Requested for:Approx  13NTQ7387;   Tinea pedis of both feet    · Ciclopirox Olamine 0 77 % External Cream; APPLY  AND RUB  IN A THIN FILM TO  AFFECTED AREAS TWICE DAILY  (AM AND PM)    Discussion/Summary  Impression: Subsequent Annual Wellness Visit, with preventive exam as well as age and risk appropriate counseling completed  Cardiovascular screening and counseling: screening is current  Diabetes screening and counseling: screening is current  Colorectal cancer screening and counseling: screening is current  Breast cancer screening and counseling: screening is current  Cervical cancer screening and counseling: screening not indicated  Osteoporosis screening and counseling: screening is current  Abdominal aortic aneurysm screening and counseling: screening not indicated  Glaucoma screening and counseling: screening is current  HIV screening and counseling: screening not indicated  Immunizations: influenza vaccine is up to date this year, pneumococcal vaccine due today, hepatitis B vaccination series is not indicated at this time due to the patient's low risk of efrain the disease, the patient declines the Zostavax vaccine and Td vaccination up to date  Advance Directive Planning: complete and up to date  Advice and education were given regarding fall risk reduction  She was referred to none  Medical Equipment/Suppliers: none  Patient Discussion: plan discussed with the patient, follow-up visit needed in 4 months  Self Referrals: No       Possible side effects of new medications were reviewed with the patient/guardian today  The treatment plan was reviewed with the patient/guardian  The patient/guardian understands and agrees with the treatment plan      Chief Complaint  HM      Advance Directives  Advance Directive [facility]:   YES - Patient has an advance health care directive  The patient has a living will located  a scanned copy is in the patient's chart  Capacity/Competence: This patient has full decision making capacity for discussion of advance care planning and This patient has full decision making competency for discussion of advance care planning  History of Present Illness  HPI: Tired all the time    Using compression pump on RUE        Welcome to Medicare and Wellness Visits: The patient is being seen for the subsequent annual wellness visit  Medicare Screening and Risk Factors   Hospitalizations: she has been previously hospitalizied and she has been hospitalized 3 times  Once per lifetime medicare screening tests: ECG ~U() and AAA screening US has not yet been done  Medicare Screening Tests Risk Questions   Abdominal aortic aneurysm risk assessment: none indicated  Osteoporosis risk assessment: , female gender and over 48years of age  HIV risk assessment: none indicated  Drug and Alcohol Use: The patient is a former cigarette smoker  The patient reports never drinking alcohol  She has never used illicit drugs  Diet and Physical Activity: Current diet includes well balanced meals  She exercises infrequently  Exercise: walking  Mood Disorder and Cognitive Impairment Screening:   Depression screening  mild to moderate symptoms  She denies feeling down, depressed, or hopeless over the past two weeks  She denies feeling little interest or pleasure in doing things over the past two weeks  Cognitive impairment screening: denies difficulty learning/retaining new information, denies difficulty handling complex tasks, denies difficulty with reasoning, denies difficulty with spatial ability and orientation, denies difficulty with language and denies difficulty with behavior  Advance Directives: Advance directives: living will, durable power of  for health care directives and advance directives  end of life decisions were not reviewed with the patient  Co-Managers and Medical Equipment/Suppliers: See Patient Care Team       Reviewed Updated [facility]:   Last Medicare Wellness Visit Information was reviewed, patient interviewed and updates made to the record today  Preventive Quality Program 65 and Older: Falls Risk: The patient fell 0 times in the past 12 months   The patient is currently asymptomatic Symptoms Include: Associated symptoms:  No associated symptoms are reported  The patient currently has no urinary incontinence symptoms  Patient Care Team    Care Team Member Role Specialty Office Number   [facility] Specialist Cardiology [phone]   Chase Yin MD  Family Medicine [phone]   Davian Roman DPM Specialist Podiatry [phone]   Nellie RAY  Specialist Surgical Oncology [phone]   [address] [phone]     Review of Systems    Constitutional: no fever and no malaise  Cardiovascular: no chest pain and no palpitations  Respiratory: no wheezing  Gastrointestinal: no abdominal pain  Musculoskeletal: no diffuse joint pain  Endocrine: no generalized weakness  Hematologic and Lymphatic: a tendency for easy bruising  Active Problems    1  Adjustment disorder with anxiety (309 24) (F43 22)   2  Complete heart block (426 0) (I44 2)   3  GERD without esophagitis (530 81) (K21 9)   4  Heart block (426 9) (I45 9)   5  Hyperglycemia (790 29) (R73 9)   6  Hyperlipidemia (272 4) (E78 5)   7  Hypertension (401 9) (I10)   8  Impingement syndrome of left shoulder (726 2) (M75 42)   9  Inguinal hernia, right (550 90) (K40 90)   10  Lymphedema (457 1) (I89 0)   11  Malignant neoplasm of upper-outer quadrant of right breast in female, estrogen receptor    positive (174 4,V86 0) (C50 411,Z17 0)   12  Onychomycosis (110 1) (B35 1)   13  Osteoporosis (733 00) (M81 0)   14  Personal history of breast cancer (V10 3) (Z85 3)   15  Presence of cardiac pacemaker (V45 01) (Z95 0)   16  Pruritus (698 9) (L29 9)   17   Urgency of urination (788 63) (R39 15)    Past Medical History    · History of hearing loss (V12 49) (Z86 69)   · History of heart block (V12 59) (Z86 79)   · History of herpes zoster (V12 09) (Z86 19)   · History of insomnia (V13 89) (T18 504)   · History of malignant neoplasm of cervix (V10 41) (Z85 41)   · History of radiation therapy (V15 3) (Z92 3)   · History of temporal arteritis (V12 59) (Z87 39)   · History of Lyme disease (088 81) (A69 20)   · History of Pain in joint of right shoulder region (719 41) (M25 511)   · Personal history of breast cancer (V10 3) (Z85 3)   · History of Use of anastrozole (Arimidex) (V07 52) (Q24 595)    The active problems and past medical history were reviewed and updated today  Surgical History    · History of Axillary Lymphadenectomy   · History of Biopsy Breast Percutaneous Needle Core   · History of Cataract Surgery   · History of Hysterectomy   · History of Inguinal Hernia Repair   · History of Knee Surgery   · History of Pacemaker Placement   · History of Permanent Pacemaker Type Dual-Chamber   · History of Right Breast Lumpectomy   · History of [place] Lymph Node Biopsy    The surgical history was reviewed and updated today  Family History  Mother    · Denied: Family history of malignant neoplasm   · Family history of Heart Disease (V17 49)  Father    · Denied: Family history of malignant neoplasm   · Family history of Heart Disease (V17 49)    The family history was reviewed and updated today  Social History    · Denied: History of Alcohol Use (History)   · Former smoker (V15 82) (Y00 297)   · Marital History -    · Non-smoker (V49 89) (Z78 9)  The social history was reviewed and updated today  Current Meds   1  Aspirin 81 MG TABS; Take 1 tablet daily; Therapy: (Recorded:07Nov2014) to Recorded   2  Calcium + D TABS; TAKE 1 TABLET TWICE DAILY as directed; Therapy: (Recorded:07Nov2014) to Recorded   3  Centrum Silver TABS; Take 1 tablet daily; Therapy: (Recorded:07Nov2014) to Recorded   4  Fish Oil CAPS; Take as directed; Therapy: (Recorded:16Jun2015) to Recorded   5  HydrOXYzine HCl - 25 MG Oral Tablet; TAKE 1/2 TO 1 TABLET EVERY 6 HOURS IF   NEEDED; Therapy: 61KQF1887 to (Evaluate:02Tuj9943)  Requested for: 74IAA3320; Last   Rx:61Omc5810 Ordered   6   LORazepam 0 5 MG Oral Tablet; take 1 tablet by mouth every 6 hours if needed for   anxiety; Therapy: 54DTD2734 to (Evaluate:13Jun2017); Last Rx:06Jun2017 Ordered   7  Metoprolol Tartrate 50 MG Oral Tablet; Take 1 tablet twice daily; Therapy: 08ELV1754 to (Evaluate:12Apr2018)  Requested for: 05Ajl1415; Last   Rx:06Hel8379 Ordered   8  Nystatin 454803 UNIT/ML Mouth/Throat Suspension; SWISH AND SWALLOW 10 ML UP   TO 5 TIMES DAILY; Therapy: 84EFA9356 to (Evaluate:02Aug2015)  Requested for: 54Ncz6214; Last   Rx:20Gug4898 Ordered   9  Pravastatin Sodium 20 MG Oral Tablet; TAKE 1 TABLET DAILY AT BEDTIME; Therapy: 00CMG7738 to (Evaluate:12Apr2018)  Requested for: 30Vhd9934; Last   Rx:35Jms0660 Ordered    The medication list was reviewed and updated today  Allergies    1  No Known Drug Allergies    Immunizations   ** Printed in Appendix #1 below  Vitals  Signs    Temperature: 97 7 F, Tympanic  Heart Rate: 76, L Radial  Pulse Quality: Regular, L Radial  Systolic: 558, LUE  Diastolic: 80, LUE  Height: 5 ft   Weight: 119 lb 6 4 oz  BMI Calculated: 23 32  BSA Calculated: 1 5    Physical Exam    Constitutional   General appearance: No acute distress, well appearing and well nourished  Pulmonary   Respiratory effort: No increased work of breathing or signs of respiratory distress  Auscultation of lungs: Clear to auscultation  Cardiovascular   Auscultation of heart: Normal rate and rhythm, normal S1 and S2, no murmurs  Carotid pulses: 2+ bilaterally  Examination of extremities for edema and/or varicosities: Normal     Musculoskeletal   Gait and station: Normal     Skin   Skin and subcutaneous tissue: Abnormal   bordered erythematous rash plantar feet laterally        Results/Data  PHQ-9 Adult Depression Screening 45Vpu6864 09:51AM Annyrickeypetr Orozco     Test Name Result Flag Reference   PHQ-9 Adult Depression Score 8     Over the last two weeks, how often have you been bothered by any of the following problems? Little interest or pleasure in doing things: More than half the days - 2  Feeling down, depressed, or hopeless: Not at all - 0  Trouble falling or staying asleep, or sleeping too much: More than half the days - 2  Feeling tired or having little energy: Nearly every day - 3  Poor appetite or over eating: Not at all - 0  Feeling bad about yourself - or that you are a failure or have let yourself or your family down: Not at all - 0  Trouble concentrating on things, such as reading the newspaper or watching television: Several days - 1  Moving or speaking so slowly that other people could have noticed  Or the opposite -  being so fidgety or restless that you have been moving around a lot more than usual: Not at all - 0  Thoughts that you would be better off dead, or of hurting yourself in some way: Not at all - 0   PHQ-9 Adult Depression Screening Negative     PHQ-9 Difficulty Level Not difficult at all     PHQ-9 Severity Mild Depression         Procedure    Procedure: Visual Acuity Test    Indication: routine screening  Inforrmation supplied by a Snellen chart  Results: 20/40 in the right eye without corrective device, 20/40 in the left eye without corrective device      Future Appointments    Date/Time Provider Specialty Site   2017 02:00 PM Cardiology, [place]   2018 02:00 PM Cardiology, [place]   2018 08:30 AM Cardiology, [place]   2018 09:45 AM FLORINDA Scales   Surgical Oncology [facility]     Signatures   Electronically signed by : Melanie Kaye MD; Sep  5 2017 10:07AM EST                       (Author)    Appendix #1     Patient: Linnette Given ; : 8/10/1924; MRN: [id number]      1 2 3 4 5    Influenza  05-Nov-2012 17-Oct-2013 10-Sep-2014 03-Nov-2015 31-Oct-2016    PCV  2015        PPSV  1996        Td/DT 04-Jun-2012

## 2018-01-15 NOTE — PROGRESS NOTES
Assessment   1  Hypertension (401 9) (I10)   2  Hyperlipidemia (272 4) (E78 5)   3  Hyperglycemia (790 29) (R73 9)   4  Complete heart block (426 0) (I44 2)   5  Malignant neoplasm of upper-outer quadrant of right breast in female, estrogen receptor     positive (174 4,V86 0) (C50 411,Z17 0)    Discussion/Summary      BP excellent reviewed in detail excellent although TG predominant cancer with RUE lymphedema- continue compression  pacemaker regularly interrogated  Possible side effects of new medications were reviewed with the patient/guardian today  The treatment plan was reviewed with the patient/guardian  The patient/guardian understands and agrees with the treatment plan       Self Referrals: No      Chief Complaint   Patient is here today for follow up of chronic conditions described in HPI  History of Present Illness   4 month followup  episode of diarrhea in L shoulder and uses Aleve every few days  X 1      Review of Systems        Constitutional: not feeling poorly,-- no recent weight gain,-- not feeling tired-- and-- no recent weight loss  ENT: no nasal discharge  Cardiovascular: no chest pain,-- no intermittent leg claudication,-- no palpitations-- and-- no lower extremity edema  Respiratory: no cough-- and-- no shortness of breath during exertion  Gastrointestinal: no nausea  Musculoskeletal: arthralgias  Integumentary: no rashes  Neurological: no headache  Psychiatric: no anxiety-- and-- no depression  Active Problems   1  Adjustment disorder with anxiety (309 24) (F43 22)   2  Complete heart block (426 0) (I44 2)   3  GERD without esophagitis (530 81) (K21 9)   4  Heart block (426 9) (I45 9)   5  Hyperglycemia (790 29) (R73 9)   6  Hyperlipidemia (272 4) (E78 5)   7  Hypertension (401 9) (I10)   8  Impingement syndrome of left shoulder (726 2) (M75 42)   9  Inguinal hernia, right (550 90) (K40 90)   10  Lymphedema (457 1) (I89 0)   11   Malignant neoplasm of upper-outer quadrant of right breast in female, estrogen receptor      positive (174 4,V86 0) (C50 411,Z17 0)   12  Need for influenza vaccination (V04 81) (Z23)   13  Onychomycosis (110 1) (B35 1)   14  Osteoporosis (733 00) (M81 0)   15  Personal history of breast cancer (V10 3) (Z85 3)   16  Presence of cardiac pacemaker (V45 01) (Z95 0)   17  Pruritus (698 9) (L29 9)   18  Tinea pedis of both feet (110 4) (B35 3)   19  Urgency of urination (788 63) (R39 15)    Past Medical History   1  History of hearing loss (V12 49) (Z86 69)   2  History of heart block (V12 59) (Z86 79)   3  History of herpes zoster (V12 09) (Z86 19)   4  History of insomnia (V13 89) (Z87 898)   5  History of malignant neoplasm of cervix (V10 41) (Z85 41)   6  History of radiation therapy (V15 3) (Z92 3)   7  History of temporal arteritis (V12 59) (Z87 39)   8  History of Lyme disease (088 81) (A69 20)   9  History of Pain in joint of right shoulder region (719 41) (M25 511)   10  Personal history of breast cancer (V10 3) (Z85 3)   11  History of Use of anastrozole (Arimidex) (V07 52) (G13 338)     The active problems and past medical history were reviewed and updated today  Surgical History   1  History of Axillary Lymphadenectomy   2  History of Biopsy Breast Percutaneous Needle Core   3  History of Cataract Surgery   4  History of Hysterectomy   5  History of Inguinal Hernia Repair   6  History of Knee Surgery   7  History of Pacemaker Permanent Placement Dual-Chamber   8  History of Pacemaker Placement   9  History of Right Breast Lumpectomy   10  History of [place] Lymph Node Biopsy     The surgical history was reviewed and updated today  Family History   Mother    1  Denied: Family history of malignant neoplasm   2  Family history of Heart Disease (V17 49)  Father    3  Denied: Family history of malignant neoplasm   4  Family history of Heart Disease (V17 49)     The family history was reviewed and updated today  Social History    · Denied: History of Alcohol Use (History)   · Former smoker (V15 82) (J81 038)   · Marital History -    · Non-smoker (V49 89) (Z78 9)  The social history was reviewed and updated today  The social history was reviewed and is unchanged  Current Meds    1  Aspirin 81 MG TABS; Take 1 tablet daily; Therapy: (Recorded:07Nov2014) to Recorded   2  Calcium + D TABS; TAKE 1 TABLET TWICE DAILY as directed; Therapy: (Recorded:07Nov2014) to Recorded   3  Centrum Silver TABS; Take 1 tablet daily; Therapy: (Recorded:07Nov2014) to Recorded   4  Ciclopirox Olamine 0 77 % External Cream; APPLY  AND RUB  IN A THIN FILM TO     AFFECTED AREAS TWICE DAILY  (AM AND PM); Therapy: 43JXC7782 to (Last Rx:12Gal7728)  Requested for: 25Rqn1879 Ordered   5  Fish Oil CAPS; Take as directed; Therapy: (Recorded:16Jun2015) to Recorded   6  HydrOXYzine HCl - 25 MG Oral Tablet; TAKE 1/2 TO 1 TABLET EVERY 6 HOURS IF     NEEDED; Therapy: 80NMD1666 to (Evaluate:98Fau9735)  Requested for: 54YYV4987; Last     Rx:21Oik5925 Ordered   7  LORazepam 0 5 MG Oral Tablet; take 1 tablet by mouth every 6 hours if needed for     anxiety; Therapy: 12YRR5480 to (Evaluate:13Jun2017); Last Rx:06Jun2017 Ordered   8  Metoprolol Tartrate 50 MG Oral Tablet; Take 1 tablet twice daily; Therapy: 32UHW0545 to (Evaluate:12Apr2018)  Requested for: 14Gjb2889; Last     Rx:71Ngg3280 Ordered   9  Nystatin 041178 UNIT/ML Mouth/Throat Suspension; SWISH AND SWALLOW 10 ML UP     TO 5 TIMES DAILY; Therapy: 38EEI5934 to (Evaluate:86Ndq1625)  Requested for: 79Rxx3041; Last     Rx:52Jgd0784 Ordered   10  Pravastatin Sodium 20 MG Oral Tablet; TAKE 1 TABLET DAILY AT BEDTIME; Therapy: 71UDV5928 to (Evaluate:12Apr2018)  Requested for: 61Cea6754; Last      Rx:17Egl3989 Ordered     The medication list was reviewed and updated today  Allergies   1   No Known Drug Allergies    Vitals   Vital Signs    Recorded: 10AFF2107 09: 56AM   Temperature 99 4 F, Tympanic   Heart Rate 68, L Radial   Pulse Quality Regular, L Radial   Systolic 221, LUE, Sitting   Diastolic 74, LUE, Sitting   Height 5 ft    Weight 118 lb 12 8 oz   BMI Calculated 23 2   BSA Calculated 1 5     Physical Exam        Constitutional      General appearance: No acute distress, well appearing and well nourished  Pulmonary      Respiratory effort: No increased work of breathing or signs of respiratory distress  Auscultation of lungs: Clear to auscultation  Cardiovascular      Auscultation of heart: Normal rate and rhythm, normal S1 and S2, without murmurs  Examination of extremities for edema and/or varicosities: Abnormal  -- RUE edma with compression garment on  Carotid pulses: Normal        Lymphatic      Palpation of lymph nodes in neck: No lymphadenopathy  Psychiatric      Orientation to person, place, and time: Normal        Mood and affect: Normal           Future Appointments      Date/Time Provider Specialty Site   02/07/2018 02:00 PM Cardiology, [place]   05/08/2018 08:30 AM Cardiology, [place]   09/06/2018 09:45 AM FLORINDA Maldonado   [facility]     Signatures    Electronically signed by : Maty Mendiola MD; Jan 14 2018  7:48PM EST                       (Author)

## 2018-01-17 NOTE — MISCELLANEOUS
Message   Recorded as Task   Date: 05/20/2016 12:31 PM, Created By: Willa Estrella   Task Name: Call Back   Assigned To: Yesenia Torres   Regarding Patient: Candice Burr, Status: Active   Comment:    KathyShahid - 20 May 2016 12:31 PM     TASK CREATED  For her shoulder pain since it is going on for a month she can be scheduled for visit early next week  If change in symptoms as worsening she should get to ER   Yesenia Torres - 20 May 2016 1:29 PM     TASK EDITED  pt scheduled for monday        Active Problems    1  Breast cancer screening, high risk patient (V76 11) (Z12 39)   2  Cervical cancer (180 9) (C53 9)   3  Eustachian tube disorder, right (381 9) (H69 91)   4  Flu vaccine need (V04 81) (Z23)   5  GERD (gastroesophageal reflux disease) (530 81) (K21 9)   6  Heart block (426 9) (I45 9)   7  Hyperlipidemia (272 4) (E78 5)   8  Hypertension (401 9) (I10)   9  Impingement syndrome of left shoulder (726 2) (M75 42)   10  Inguinal hernia, right (550 90) (K40 90)   11  Oral thrush (112 0) (B37 0)   12  Osteoporosis (733 00) (M81 0)   13  Personal history of breast cancer (V10 3) (Z85 3)   14  Skin rash (782 1) (R21)    Current Meds   1  Aspirin 81 MG TABS; Take 1 tablet daily; Therapy: (Recorded:07Nov2014) to Recorded   2  Calcium + D TABS; TAKE 1 TABLET TWICE DAILY as directed; Therapy: (Recorded:07Nov2014) to Recorded   3  Centrum Silver TABS; Take 1 tablet daily; Therapy: (Recorded:07Nov2014) to Recorded   4  Fish Oil CAPS; Take as directed; Therapy: (Recorded:16Jun2015) to Recorded   5  Fluticasone Propionate 50 MCG/ACT Nasal Suspension; USE 2 SPRAYS IN EACH   NOSTRIL ONCE DAILY; Therapy: 40KBE2006 to (Evaluate:70Wuw0379)  Requested for: 97IET1986; Last   Rx:03Nov2015 Ordered   6  HydrOXYzine HCl - 25 MG Oral Tablet; TAKE 1/2 OR 1 TABLET AT BEDTIME AS   NEEDED; Therapy: 00ALF1640 to [phone])  Requested for: 18Eqq2833; Last   Rx:29Bax0804 Ordered   7   Metoprolol Tartrate 50 MG Oral Tablet; take one tablet by mouth twice daily; Therapy: 88RMT9130 to (Evaluate:18Oct2016)  Requested for: 21Apr2016; Last   Rx:21Apr2016 Ordered   8  Nystatin 653832 UNIT/ML Mouth/Throat Suspension; SWISH AND SWALLOW 10 ML UP   TO 5 TIMES DAILY; Therapy: 44PIJ4294 to (Evaluate:94Woc4491)  Requested for: 73Sna2434; Last   Rx:47Juk7497 Ordered   9  Pravastatin Sodium 20 MG Oral Tablet; TAKE 1 TABLET DAILY AT BEDTIME; Therapy: 62CXI9269 to (Evaluate:11Jan2017)  Requested for: 63RDS8838; Last   Rx:17Jan2016 Ordered    Allergies    1   No Known Drug Allergies    Signatures   Electronically signed by : Keily Dean MD; May 20 2016  2:41PM EST                       (Co-author)

## 2018-01-17 NOTE — RESULT NOTES
Verified Results  VAS UPPER LIMB VENOUS DUPLEX SCAN, UNILATERAL/LIMITED [id number] 09:04AM Josue Chery Order Number: [id number]    - Patient Instructions: To schedule this appointment, please contact Central Scheduling at [phone]  Test Name Result Flag Reference   VAS UPPER LIMB VENOUS DUPLEX SCAN, UNILATERAL/LIMITED (Report)     [facility] REPORT   CLINICAL:   Indications: Right Edema [M79 89]  Pt  complains of right arm swelling x 2   days  She has no pain, but feels a pulling in her armpit  Hx of right   lumpectomy with lymph node removal in 2009  Risk Factors: The patient has history of malignancy  FINDINGS:      Right     Impression       Int  Jugular Normal (Patent)             CONCLUSION:   Impression   RIGHT UPPER LIMB: NORMAL   No evidence of acute or chronic deep vein thrombosis  No evidence of superficial thrombophlebitis noted  Doppler evaluation shows a normal response to augmentation maneuvers  LEFT UPPER LIMB LIMITED: NORMAL   Evaluation shows no evidence of thrombus in the internal jugular vein,   subclavian vein, and innominate vein  SIGNATURE:   Electronically Signed by: Gabriela Churchill MD on 2016-12-01 02:24:33 PM       Discussion/Summary    Testing was negative for blood clot or enlarged lymph nodes  Continue to elevate it when able   No further testing is needed    pt aware

## 2018-01-22 VITALS
WEIGHT: 118.8 LBS | HEART RATE: 68 BPM | TEMPERATURE: 99.4 F | HEIGHT: 60 IN | BODY MASS INDEX: 23.32 KG/M2 | SYSTOLIC BLOOD PRESSURE: 132 MMHG | DIASTOLIC BLOOD PRESSURE: 74 MMHG

## 2018-02-10 ENCOUNTER — OFFICE VISIT (OUTPATIENT)
Dept: URGENT CARE | Facility: CLINIC | Age: 83
End: 2018-02-10
Payer: MEDICARE

## 2018-02-10 VITALS
HEART RATE: 94 BPM | TEMPERATURE: 98.2 F | RESPIRATION RATE: 16 BRPM | BODY MASS INDEX: 22.97 KG/M2 | SYSTOLIC BLOOD PRESSURE: 128 MMHG | DIASTOLIC BLOOD PRESSURE: 64 MMHG | OXYGEN SATURATION: 97 % | HEIGHT: 60 IN | WEIGHT: 117 LBS

## 2018-02-10 DIAGNOSIS — R35.0 URINARY FREQUENCY: ICD-10-CM

## 2018-02-10 DIAGNOSIS — K29.00 ACUTE GASTRITIS WITHOUT HEMORRHAGE, UNSPECIFIED GASTRITIS TYPE: Primary | ICD-10-CM

## 2018-02-10 LAB
SL AMB  POCT GLUCOSE, UA: NEGATIVE
SL AMB LEUKOCYTE ESTERASE,UA: ABNORMAL
SL AMB POCT BILIRUBIN,UA: ABNORMAL
SL AMB POCT BLOOD,UA: ABNORMAL
SL AMB POCT CLARITY,UA: ABNORMAL
SL AMB POCT COLOR,UA: ABNORMAL
SL AMB POCT KETONES,UA: NEGATIVE
SL AMB POCT NITRITE,UA: NEGATIVE
SL AMB POCT PH,UA: 6
SL AMB POCT SPECIFIC GRAVITY,UA: 1.01
SL AMB POCT URINE PROTEIN: 30
SL AMB POCT UROBILINOGEN: 0.2

## 2018-02-10 PROCEDURE — 87086 URINE CULTURE/COLONY COUNT: CPT | Performed by: PREVENTIVE MEDICINE

## 2018-02-10 PROCEDURE — 99213 OFFICE O/P EST LOW 20 MIN: CPT | Performed by: PREVENTIVE MEDICINE

## 2018-02-10 PROCEDURE — 81002 URINALYSIS NONAUTO W/O SCOPE: CPT | Performed by: PREVENTIVE MEDICINE

## 2018-02-10 PROCEDURE — G0463 HOSPITAL OUTPT CLINIC VISIT: HCPCS | Performed by: PREVENTIVE MEDICINE

## 2018-02-10 RX ORDER — SULFAMETHOXAZOLE AND TRIMETHOPRIM 800; 160 MG/1; MG/1
1 TABLET ORAL EVERY 12 HOURS SCHEDULED
Qty: 6 TABLET | Refills: 0 | Status: SHIPPED | OUTPATIENT
Start: 2018-02-10 | End: 2018-02-13

## 2018-02-10 RX ORDER — FAMOTIDINE 20 MG/1
20 TABLET, FILM COATED ORAL 2 TIMES DAILY
Status: DISCONTINUED | OUTPATIENT
Start: 2018-02-10 | End: 2018-04-26

## 2018-02-10 RX ORDER — MAGNESIUM HYDROXIDE/ALUMINUM HYDROXICE/SIMETHICONE 120; 1200; 1200 MG/30ML; MG/30ML; MG/30ML
30 SUSPENSION ORAL EVERY 4 HOURS PRN
Status: SHIPPED | OUTPATIENT
Start: 2018-02-10

## 2018-02-10 RX ADMIN — FAMOTIDINE 20 MG: 20 TABLET, FILM COATED ORAL at 14:08

## 2018-02-10 RX ADMIN — MAGNESIUM HYDROXIDE/ALUMINUM HYDROXICE/SIMETHICONE 30 ML: 120; 1200; 1200 SUSPENSION ORAL at 14:06

## 2018-02-10 NOTE — PATIENT INSTRUCTIONS
Use all the antibiotic and then she should have a repeat urinalysis to see that the bladder infection has cleared  By Mylanta or Maalox or jaw is still an you may use sips of this every hour to 2 for acute gastritis and burping and the feeling of acid stomach  Over-the-counter in the pharmacy you can buy Pepcid in take 1 tablet twice a day  Check in with her family doctor on Monday for follow-up

## 2018-02-10 NOTE — PROGRESS NOTES
Assessment/Plan:      Diagnoses and all orders for this visit:    Acute gastritis without hemorrhage, unspecified gastritis type  -     famotidine (PEPCID) tablet 20 mg; Take 1 tablet (20 mg total) by mouth 2 (two) times a day   -     aluminum-magnesium hydroxide-simethicone (MYLANTA) 200-200-20 mg/5 mL oral suspension 30 mL; Take 30 mL by mouth every 4 (four) hours as needed for indigestion or heartburn   -     lidocaine viscous (XYLOCAINE) 2 % mucosal solution 15 mL; Swish and spit 15 mL 4 (four) times a day as needed for mild pain     Urinary frequency  -     POCT urine dip auto non-scope  -     Urine culture  -     sulfamethoxazole-trimethoprim (BACTRIM DS) 800-160 mg per tablet; Take 1 tablet by mouth every 12 (twelve) hours for 3 days          Subjective:     Patient ID: Mary Lou Chen is a 80 y o  female  Two issues, the 1 is frequency of urination particularly at night when she goes for 5 times  Second issue is that for the past 2 or 3 days she has upper abdominal pain increased burping sensation of acid and gastritis  She reports mild nausea but no diarrhea  Abdominal Pain   Associated symptoms include dysuria, frequency and nausea  Urinary Frequency    Associated symptoms include frequency and nausea  Review of Systems   Gastrointestinal: Positive for abdominal pain and nausea  Genitourinary: Positive for dysuria and frequency  Objective:     Physical Exam   Abdominal:   The abdomen is soft in all 4 quadrants with no guarding or rebound  No organomegaly noted  There is some pain in the epigastric region to deep palpation

## 2018-02-13 ENCOUNTER — OFFICE VISIT (OUTPATIENT)
Dept: FAMILY MEDICINE CLINIC | Facility: HOSPITAL | Age: 83
End: 2018-02-13
Payer: MEDICARE

## 2018-02-13 VITALS
DIASTOLIC BLOOD PRESSURE: 72 MMHG | HEIGHT: 60 IN | HEART RATE: 68 BPM | BODY MASS INDEX: 23.36 KG/M2 | SYSTOLIC BLOOD PRESSURE: 146 MMHG | WEIGHT: 119 LBS | TEMPERATURE: 97.2 F

## 2018-02-13 DIAGNOSIS — R39.15 URINARY URGENCY: ICD-10-CM

## 2018-02-13 DIAGNOSIS — K21.9 GERD WITHOUT ESOPHAGITIS: Primary | ICD-10-CM

## 2018-02-13 PROBLEM — C50.411 MALIGNANT NEOPLASM OF UPPER-OUTER QUADRANT OF RIGHT BREAST IN FEMALE, ESTROGEN RECEPTOR POSITIVE (HCC): Status: ACTIVE | Noted: 2017-08-31

## 2018-02-13 PROBLEM — B35.3 TINEA PEDIS OF BOTH FEET: Status: ACTIVE | Noted: 2017-09-05

## 2018-02-13 PROBLEM — F43.22 ADJUSTMENT DISORDER WITH ANXIETY: Status: ACTIVE | Noted: 2017-06-06

## 2018-02-13 PROBLEM — B35.1 ONYCHOMYCOSIS: Status: ACTIVE | Noted: 2017-05-03

## 2018-02-13 PROBLEM — Z17.0 MALIGNANT NEOPLASM OF UPPER-OUTER QUADRANT OF RIGHT BREAST IN FEMALE, ESTROGEN RECEPTOR POSITIVE (HCC): Status: ACTIVE | Noted: 2017-08-31

## 2018-02-13 PROBLEM — R73.9 HYPERGLYCEMIA: Status: ACTIVE | Noted: 2017-05-03

## 2018-02-13 PROBLEM — L29.9 PRURITUS: Status: ACTIVE | Noted: 2017-07-13

## 2018-02-13 PROBLEM — I44.2 COMPLETE HEART BLOCK (HCC): Status: ACTIVE | Noted: 2017-07-24

## 2018-02-13 LAB — BACTERIA UR CULT: NORMAL

## 2018-02-13 PROCEDURE — 99213 OFFICE O/P EST LOW 20 MIN: CPT | Performed by: FAMILY MEDICINE

## 2018-02-13 NOTE — PROGRESS NOTES
Assessment/Plan:         Diagnoses and all orders for this visit:    GERD without esophagitis  Comments:  Symptomatically improved on Famotidine initially and Mylanta PRN  Keep constipation at a minimum    Urinary urgency  Comments:  Not a significant UTI by culture  Complete Sulfamethoxazole  Subjective:      Patient ID: Christina Vasquez is a 80 y o  female  Seen in ER Urgent Care for gastritis and also for urgency of urination  Given Bactrim DS and dose of Famotidine in house  Frequency at night down from 5 times to one time a night  Taking Mylanta but decreasing frequency at this time  Note reviewed from Urgent Care, culture ultimately negative for UTI        The following portions of the patient's history were reviewed and updated as appropriate: allergies, current medications, past family history, past medical history, past social history, past surgical history and problem list     Review of Systems   Constitutional: Negative for chills, diaphoresis, fatigue and fever  HENT: Negative for postnasal drip and sinus pressure  Respiratory: Negative for chest tightness, shortness of breath and wheezing  Cardiovascular: Negative for chest pain  Gastrointestinal: Positive for constipation  Negative for abdominal pain and nausea  Genitourinary: Positive for frequency and urgency  Musculoskeletal: Negative for back pain  Hematological: Does not bruise/bleed easily  Psychiatric/Behavioral: The patient is not nervous/anxious  Objective:    Vitals:    02/13/18 1054   BP: 146/72   Pulse: 68   Temp: (!) 97 2 °F (36 2 °C)        Physical Exam   Constitutional: She appears well-nourished  HENT:   Head: Normocephalic and atraumatic  Cardiovascular: Normal rate and regular rhythm  Pulmonary/Chest: Effort normal and breath sounds normal    Abdominal: She exhibits no distension  There is no tenderness  Musculoskeletal: She exhibits no edema  Skin: No rash noted     Psychiatric: She has a normal mood and affect   Her behavior is normal  Judgment and thought content normal

## 2018-03-07 NOTE — PROGRESS NOTES
"  Discussion/Summary  Normal device function     Reached [facility]  Results/Data  Cardiac Device In Clinic [id number] 05:25PM Mark Cheatham     Test Name Result Flag Reference   MISCELLANEOUS COMMENT (Report)     DEVICE INTERROGATED IN THE [place] OFFICE:[id number] VOLTAGE @ [facility] (5/22/17)  ALL TELEMETRY OFF - VVI 65 PACING MODE   = 100% (>40%/PPM DEP @ VVI 30)  ALL AVAILABLE LEAD PARAMETERS APPEAR WITHIN NORMAL LIMITS  NO TESTING DUE TO BATTERY STATUS  NO SIGNIFICANT HIGH RATE EPISODES  H&P SCHEDULED WITH DR Murray Kayser 6/8/17 @ [facility]  Cardiac Electrophysiology Report      [id number] [id number] [id number]_17_1  pdf   DEVICE TYPE Pacemaker       Cardiac Electrophysiology Report [id number] 05:25PM Mark Cheatham     Test Name Result Flag Reference   Cardiac Electrophysiology Report      [id number]  pdf     Signatures   Electronically signed by : Yelena Colón, ; May 24 2017  2:22PM EST                       (Author)    Electronically signed by : FLORINDA Briones ; May 28 2017  8:12AM EST                       (Author)    "

## 2018-03-07 NOTE — PROGRESS NOTES
"  Discussion/Summary  Normal device function     Dependent  Results/Data  Cardiac Device Remote [id number] 05:04PM José Golds     Test Name Result Flag Reference   MISCELLANEOUS COMMENT      CARELINK TRANSMISSION: BATTERY VOLTAGE ADEQUATE (10 5 YRS)  AP - 75%  - 100% (>40%/AVB - PPM DEP)  ALL AVAILABLE LEAD PARAMETERS APPEAR WITHIN NORMAL LIMITS & STABLE  NO SIGNIFICANT HIGH RATE EPISODES  PACEMAKER FUNCTIONING APPROPRIATELY  eb   Cardiac Electrophysiology Report      [id number]  pdf   DEVICE TYPE Pacemaker       Cardiac Electrophysiology Report [id number] 05:04PM Yap     Test Name Result Flag Reference   Cardiac Electrophysiology Report      [id number] pdf     Signatures   Electronically signed by : Parish Hammond, ; Nov 7 2017  1:52PM EST                       (Author)    Electronically signed by : FLORINDA Schmitz ; Nov 10 2017  1:17PM EST                       (Author)    "

## 2018-03-07 NOTE — PROGRESS NOTES
"  Discussion/Summary  Normal device function      Results/Data  Results   Cardiac Device In Clinic [id number] 05:11PM [facility]     Test Name Result Flag Reference   MISCELLANEOUS COMMENT (Report)     DEVICE INTERROGATED IN THE [facility] OFFICE: BATTERY VOLTAGE ADEQUATE (23 MONTHS)  AP 54%  99 7% (>40% - 2:1 AVB)  ALL LEAD PARAMETERS WITHIN NORMAL LIMITS  NO SIGNIFICANT HIGH RATE EPISODES  NO PROGRAMMING CHANGES MADE TO DEVICE PARAMETERS  PACEMAKER FUNCTIONING APPROPRIATELY  CP   Cardiac Electrophysiology Report      [id number] [id number] [id number]  pdf   DEVICE TYPE Pacemaker       Cardiac Electrophysiology Report [id number] 05:11PM [facility]     Test Name Result Flag Reference   Cardiac Electrophysiology Report      [id number]  pdf     Signatures   Electronically signed by : Elijah Livingston, ; May 25 2016  2:24PM EST                       (Author)    Electronically signed by : Anahy Harman DO; May 28 2016 10:39AM EST                       (Author)    "

## 2018-03-07 NOTE — PROGRESS NOTES
"  Discussion/Summary  Normal device function     Dependent  Results/Data  Cardiac Device Remote [id number] 04:37PM Joselin Campos     Test Name Result Flag Reference   MISCELLANEOUS COMMENT      CARELINK TRANSMISSION: BATTERY VOLTAGE NEARING [facility]  WILL SCHEDULE MONTHLY BATTERY CHECKS  AP 54%  100%  NO SIGNIFICANT HIGH RATE EPISODES  ALL AVAILABLE LEAD PARAMETERS WITHIN NORMAL LIMITS  NORMAL DEVICE FUNCTION  NC   Cardiac Electrophysiology Report      [id number]  pdf   DEVICE TYPE Pacemaker       Cardiac Electrophysiology Report [id number] 04:37PM Joselin Campos     Test Name Result Flag Reference   Cardiac Electrophysiology Report      [id number]  pdf     Signatures   Electronically signed by : Chris Saini, ; Mar 15 2017  3:34PM EST                       (Author)    Electronically signed by : Severiano Santee, M D ; Mar 15 2017  4:46PM EST                       (Author)    "

## 2018-03-07 NOTE — PROGRESS NOTES
"  Discussion/Summary  Normal device function      Results/Data  Cardiac Device Remote [id number] 03:46PM Dalia Self     Test Name Result Flag Reference   MISCELLANEOUS COMMENT      CARELINK TRANSMISSION: BATTERY VOLTAGE NEARING [facility]  WILL SCHEDULE MONTHLY BATTERY CHECKS  AP 56 2%  100%  NO SIGNIFICANT HIGH RATE EPISODES  ALL AVAILABLE LEAD PARAMETERS WITHIN NORMAL LIMITS  NORMAL DEVICE FUNCTION  NC   Cardiac Electrophysiology Report      [id number]  pdf   DEVICE TYPE Pacemaker       Cardiac Electrophysiology Report [id number] 03:46PM Dalia Self     Test Name Result Flag Reference   Cardiac Electrophysiology Report      [id number]  pdf     Signatures   Electronically signed by : Laura Burr, ; Dec  6 2016 12:09PM EST                       (Author)    Electronically signed by : Reyes Tejeda DO; Dec  6 2016  5:22PM EST                       (Author)    "

## 2018-03-07 NOTE — PROGRESS NOTES
"  Discussion/Summary  Normal device function      Results/Data  Cardiac Device Remote [id number] 12:02PM Estefany Busing     Test Name Result Flag Reference   MISCELLANEOUS COMMENT      CARELINK TRANSMISSION: [id number] VOLTAGE ADEQUATE  (18 MON)  AP 57%  99%  ALL AVAILABLE LEAD PARAMETERS WITHIN NORMAL LIMITS  NO SIGNIFICANT HIGH RATE EPISODES  NORMAL DEVICE FUNCTION  ---KEANE   Cardiac Electrophysiology Report      [id number]  pdf   DEVICE TYPE Pacemaker       Cardiac Electrophysiology Report [id number] 12:02PM Estefany Busing     Test Name Result Flag Reference   Cardiac Electrophysiology Report      [id number]  pdf     Signatures   Electronically signed by : Stefani Sam, ; Aug 29 2016 10:30AM EST                       (Author)    Electronically signed by : Daxa Dominguez DO; Aug 29 2016  6:16PM EST                       (Author)    "

## 2018-03-07 NOTE — PROGRESS NOTES
"  Discussion/Summary  Normal device function     Dependent at current rate  Results/Data  Cardiac Device In Clinic [id number] 06:21PM [facility] (roll.to)     Test Name Result Flag Reference   MISCELLANEOUS COMMENT (Report)     DEVICE INTERROGATED IN THE [place] OFFICE  WOUND CHECK: INCISION CLEAN AND DRY WITH EDGES APPROXIMATED; STERI STRIPS REMOVED; WOUND CARE AND RESTRICTIONS REVIEWED WITH PATIENT  [id number] VOLTAGE ADEQUATE (MARY - 11 YRS)  AP - 81%  -100% (>40%/PPM DEP)  ALL LEAD PARAMETERS TEST WITHIN NORMAL LIMITS  & STABLE  NO SIGNIFICANT HIGH RATE EPISODES  NO PROGRAMMING CHANGES MADE TO DEVICE PARAMETERS  PACEMAKER FUNCTIONING APPROPRIATELY   eb   Cardiac Electrophysiology Report      [id number] JUAN [id number] [id number]_04_17_1  pdf   DEVICE TYPE Pacemaker       Cardiac Electrophysiology Report [id number] 06:21PM Last.fmalonso Greene     Test Name Result Flag Reference   Cardiac Electrophysiology Report      [id number]  pdf     Signatures   Electronically signed by : Peter Lofton, ; Aug  4 2017  3:21PM EST                       (Author)    Electronically signed by : FLORINDA Escobedo ; Aug  6 2017  3:06PM EST                       (Author)    "

## 2018-04-24 DIAGNOSIS — E78.2 MIXED HYPERLIPIDEMIA: ICD-10-CM

## 2018-04-24 DIAGNOSIS — I10 ESSENTIAL HYPERTENSION: Primary | ICD-10-CM

## 2018-04-24 RX ORDER — PRAVASTATIN SODIUM 20 MG
20 TABLET ORAL DAILY
Qty: 90 TABLET | Refills: 3 | Status: SHIPPED | OUTPATIENT
Start: 2018-04-24 | End: 2018-12-03 | Stop reason: SDUPTHER

## 2018-04-24 RX ORDER — METOPROLOL TARTRATE 50 MG/1
50 TABLET, FILM COATED ORAL 2 TIMES DAILY
Qty: 180 TABLET | Refills: 3 | Status: SHIPPED | OUTPATIENT
Start: 2018-04-24 | End: 2018-09-01 | Stop reason: SDUPTHER

## 2018-04-26 ENCOUNTER — OFFICE VISIT (OUTPATIENT)
Dept: FAMILY MEDICINE CLINIC | Facility: HOSPITAL | Age: 83
End: 2018-04-26
Payer: MEDICARE

## 2018-04-26 VITALS
TEMPERATURE: 97.9 F | WEIGHT: 117.8 LBS | BODY MASS INDEX: 23.13 KG/M2 | SYSTOLIC BLOOD PRESSURE: 130 MMHG | HEART RATE: 61 BPM | DIASTOLIC BLOOD PRESSURE: 80 MMHG | HEIGHT: 60 IN

## 2018-04-26 DIAGNOSIS — R22.1 LUMP ON NECK: ICD-10-CM

## 2018-04-26 DIAGNOSIS — R53.83 OTHER FATIGUE: ICD-10-CM

## 2018-04-26 DIAGNOSIS — R13.10 DYSPHAGIA, UNSPECIFIED TYPE: ICD-10-CM

## 2018-04-26 DIAGNOSIS — K21.9 GERD WITHOUT ESOPHAGITIS: Primary | ICD-10-CM

## 2018-04-26 DIAGNOSIS — R30.0 DYSURIA: ICD-10-CM

## 2018-04-26 DIAGNOSIS — R11.0 NAUSEA: ICD-10-CM

## 2018-04-26 LAB
BACTERIA UR QL AUTO: ABNORMAL /HPF
BILIRUB UR QL STRIP: NEGATIVE
CAOX CRY URNS QL MICRO: ABNORMAL /HPF
CLARITY UR: ABNORMAL
COLOR UR: YELLOW
GLUCOSE UR STRIP-MCNC: NEGATIVE MG/DL
HGB UR QL STRIP.AUTO: NEGATIVE
KETONES UR STRIP-MCNC: NEGATIVE MG/DL
LEUKOCYTE ESTERASE UR QL STRIP: NEGATIVE
NITRITE UR QL STRIP: NEGATIVE
NON-SQ EPI CELLS URNS QL MICRO: ABNORMAL /HPF
PH UR STRIP.AUTO: 7.5 [PH] (ref 4.5–8)
PROT UR STRIP-MCNC: ABNORMAL MG/DL
RBC #/AREA URNS AUTO: ABNORMAL /HPF
SL AMB  POCT GLUCOSE, UA: NORMAL
SL AMB LEUKOCYTE ESTERASE,UA: ABNORMAL
SL AMB POCT BILIRUBIN,UA: ABNORMAL
SL AMB POCT BLOOD,UA: 50
SL AMB POCT CLARITY,UA: CLEAR
SL AMB POCT COLOR,UA: YELLOW
SL AMB POCT KETONES,UA: ABNORMAL
SL AMB POCT NITRITE,UA: ABNORMAL
SL AMB POCT PH,UA: 8
SL AMB POCT SPECIFIC GRAVITY,UA: 1.01
SL AMB POCT URINE PROTEIN: ABNORMAL
SL AMB POCT UROBILINOGEN: NORMAL
SP GR UR STRIP.AUTO: 1.02 (ref 1–1.03)
UROBILINOGEN UR QL STRIP.AUTO: 0.2 E.U./DL
WBC #/AREA URNS AUTO: ABNORMAL /HPF

## 2018-04-26 PROCEDURE — 81001 URINALYSIS AUTO W/SCOPE: CPT | Performed by: NURSE PRACTITIONER

## 2018-04-26 PROCEDURE — 81002 URINALYSIS NONAUTO W/O SCOPE: CPT | Performed by: NURSE PRACTITIONER

## 2018-04-26 PROCEDURE — 99214 OFFICE O/P EST MOD 30 MIN: CPT | Performed by: NURSE PRACTITIONER

## 2018-04-26 RX ORDER — FAMOTIDINE 20 MG/1
20 TABLET, FILM COATED ORAL 2 TIMES DAILY
Qty: 60 TABLET | Refills: 1 | Status: SHIPPED | OUTPATIENT
Start: 2018-04-26 | End: 2018-06-26 | Stop reason: SDUPTHER

## 2018-04-26 NOTE — PROGRESS NOTES
Assessment/Plan:     Nausea likely GERD related  Will restart famotidine  If no improvement should see GI for scope  Will f/u with Dr Sara Murphy next month  Can discuss with him further  To call if any symptoms worsen  With increased fatigue and overall not feeling well /   Will order labs to evaluate further  Neck lump appears subcutaneous but given other symptoms will eval further with US  Urine dip slightly positive  I do not feel this is UTI but will send urine to lab  Report of difficulty with swallowing liquids  May be r/t GERD  May need to consider swallow eval       Diagnoses and all orders for this visit:    GERD without esophagitis  -     famotidine (PEPCID) 20 mg tablet; Take 1 tablet (20 mg total) by mouth 2 (two) times a day    Nausea  -     famotidine (PEPCID) 20 mg tablet; Take 1 tablet (20 mg total) by mouth 2 (two) times a day    Other fatigue  -     CBC and differential; Future  -     Comprehensive metabolic panel; Future  -     TSH, 3rd generation with T4 reflex; Future    Lump on neck  -     CBC and differential; Future  -     US head neck soft tissue; Future    Dysuria  -     UA w Reflex to Microscopic w Reflex to Culture - Clinic Collect          Subjective:     Patient ID: Richelle Belle is a 80 y o  female  Feels nauseated after eating  Feels hot and cold  Has lump left side of neck  Not painful  Not getting any bigger  Has facial flushing for years but getting worse  Overall not feeling well  Better in morning than night  Very tired  Worse than normal  Nausea has been going on for about 2 months  Has good appetite  Has been taking Mylanta and sometimes Tums  Denies heartburn  Will feel nauseated soon after eating  Drinking water will also make her nauseated  Reports losing a little weight  Not eating as many snacks  Nausea occurs with almost everything she eats  Drinks coffee which really makes her nauseated  Eats variety of foods  A lot of carbs   No abdominal pain  Has issues with constipation  No blood in stool  Had 1 episode of lower abdominal pain followed by 3 days of diarrhea which has since stopped  Occurred 3 weeks ago  Was seen in Urgent care 2 months ago for gastritis and was started on famotidine  Also had positive urine dip and was started on antibiotic  Saw Dr Sara Murphy 3 days later  Urine culture was negative  Stopped the famotidine  Admits to having issues with swallowing liquids  No issue with swallowing solids  Did have episode of choking on pork and needed scope to remove  Unsure about fever  Denies night sweats  h/o left breast cancer about 10 years ago  Has chronic lymphedema r/t mastectomy  Has some dysuria  Very slight  Started with runny nose today  No congestion, ear pain or sore throat  Denies chest pain  Has chronic sob and not any worse than normal          Review of Systems   Constitutional: Positive for chills, fatigue and unexpected weight change  Negative for appetite change and fever  HENT: Positive for rhinorrhea  Negative for congestion, ear pain and sore throat  Respiratory: Positive for shortness of breath  Negative for cough  Cardiovascular: Negative for chest pain and palpitations  Gastrointestinal: Positive for constipation and nausea  Negative for abdominal distention, abdominal pain, blood in stool, diarrhea and vomiting  Genitourinary: Positive for dysuria  Negative for flank pain, frequency and hematuria  The following portions of the patient's history were reviewed and updated as appropriate: allergies, current medications, past family history, past medical history, past social history, past surgical history and problem list     Objective:  Vitals:    04/26/18 0857   BP: 130/80   Pulse: 61   Temp: 97 9 °F (36 6 °C)      Physical Exam   Constitutional: She is oriented to person, place, and time  She appears well-developed and well-nourished  HENT:   Head: Normocephalic     Right Ear: External ear normal  Left Ear: External ear normal    Mouth/Throat: Oropharynx is clear and moist    Neck:   Left side neck with hard palpable lump  About 5 mm  Non mobile  Able to visualize and appears subcutaneous  Tender  No redness or swelling  Cardiovascular: Normal rate, regular rhythm and normal heart sounds  Pulmonary/Chest: Effort normal and breath sounds normal    Abdominal: Normal appearance  Bowel sounds are increased  There is no hepatosplenomegaly  There is tenderness in the epigastric area  Neurological: She is alert and oriented to person, place, and time  Skin: Skin is warm and dry  Psychiatric: She has a normal mood and affect

## 2018-04-30 ENCOUNTER — HOSPITAL ENCOUNTER (OUTPATIENT)
Dept: ULTRASOUND IMAGING | Facility: HOSPITAL | Age: 83
Discharge: HOME | End: 2018-04-30
Payer: MEDICARE

## 2018-04-30 ENCOUNTER — APPOINTMENT (OUTPATIENT)
Dept: LAB | Facility: HOSPITAL | Age: 83
End: 2018-04-30
Payer: MEDICARE

## 2018-04-30 DIAGNOSIS — R22.1 LUMP ON NECK: ICD-10-CM

## 2018-04-30 DIAGNOSIS — L98.9 SKIN LESION: Primary | ICD-10-CM

## 2018-04-30 DIAGNOSIS — R53.83 OTHER FATIGUE: ICD-10-CM

## 2018-04-30 LAB
ALBUMIN SERPL BCP-MCNC: 3.5 G/DL (ref 3.5–5)
ALP SERPL-CCNC: 53 U/L (ref 46–116)
ALT SERPL W P-5'-P-CCNC: 19 U/L (ref 12–78)
ANION GAP SERPL CALCULATED.3IONS-SCNC: 6 MMOL/L (ref 4–13)
AST SERPL W P-5'-P-CCNC: 29 U/L (ref 5–45)
BASOPHILS # BLD AUTO: 0.08 THOUSANDS/ΜL (ref 0–0.1)
BASOPHILS NFR BLD AUTO: 1 % (ref 0–1)
BILIRUB SERPL-MCNC: 0.5 MG/DL (ref 0.2–1)
BUN SERPL-MCNC: 24 MG/DL (ref 5–25)
CALCIUM SERPL-MCNC: 8.6 MG/DL (ref 8.3–10.1)
CHLORIDE SERPL-SCNC: 101 MMOL/L (ref 100–108)
CO2 SERPL-SCNC: 33 MMOL/L (ref 21–32)
CREAT SERPL-MCNC: 1.03 MG/DL (ref 0.6–1.3)
EOSINOPHIL # BLD AUTO: 0.52 THOUSAND/ΜL (ref 0–0.61)
EOSINOPHIL NFR BLD AUTO: 6 % (ref 0–6)
ERYTHROCYTE [DISTWIDTH] IN BLOOD BY AUTOMATED COUNT: 14.1 % (ref 11.6–15.1)
GFR SERPL CREATININE-BSD FRML MDRD: 47 ML/MIN/1.73SQ M
GLUCOSE P FAST SERPL-MCNC: 86 MG/DL (ref 65–99)
HCT VFR BLD AUTO: 41.3 % (ref 34.8–46.1)
HGB BLD-MCNC: 13.7 G/DL (ref 11.5–15.4)
LYMPHOCYTES # BLD AUTO: 2.22 THOUSANDS/ΜL (ref 0.6–4.47)
LYMPHOCYTES NFR BLD AUTO: 26 % (ref 14–44)
MCH RBC QN AUTO: 30.4 PG (ref 26.8–34.3)
MCHC RBC AUTO-ENTMCNC: 33.2 G/DL (ref 31.4–37.4)
MCV RBC AUTO: 92 FL (ref 82–98)
MONOCYTES # BLD AUTO: 1.04 THOUSAND/ΜL (ref 0.17–1.22)
MONOCYTES NFR BLD AUTO: 12 % (ref 4–12)
NEUTROPHILS # BLD AUTO: 4.86 THOUSANDS/ΜL (ref 1.85–7.62)
NEUTS SEG NFR BLD AUTO: 55 % (ref 43–75)
PLATELET # BLD AUTO: 253 THOUSANDS/UL (ref 149–390)
PMV BLD AUTO: 8.9 FL (ref 8.9–12.7)
POTASSIUM SERPL-SCNC: 4.5 MMOL/L (ref 3.5–5.3)
PROT SERPL-MCNC: 8.2 G/DL (ref 6.4–8.2)
RBC # BLD AUTO: 4.51 MILLION/UL (ref 3.81–5.12)
SODIUM SERPL-SCNC: 140 MMOL/L (ref 136–145)
TSH SERPL DL<=0.05 MIU/L-ACNC: 3.44 UIU/ML (ref 0.36–3.74)
WBC # BLD AUTO: 8.72 THOUSAND/UL (ref 4.31–10.16)

## 2018-04-30 PROCEDURE — 76536 US EXAM OF HEAD AND NECK: CPT

## 2018-04-30 PROCEDURE — 84443 ASSAY THYROID STIM HORMONE: CPT

## 2018-04-30 PROCEDURE — 85025 COMPLETE CBC W/AUTO DIFF WBC: CPT

## 2018-04-30 PROCEDURE — 36415 COLL VENOUS BLD VENIPUNCTURE: CPT

## 2018-04-30 PROCEDURE — 80053 COMPREHEN METABOLIC PANEL: CPT

## 2018-05-08 ENCOUNTER — OFFICE VISIT (OUTPATIENT)
Dept: FAMILY MEDICINE CLINIC | Facility: HOSPITAL | Age: 83
End: 2018-05-08
Payer: MEDICARE

## 2018-05-08 VITALS
HEART RATE: 76 BPM | HEIGHT: 60 IN | DIASTOLIC BLOOD PRESSURE: 70 MMHG | TEMPERATURE: 97.6 F | BODY MASS INDEX: 23.4 KG/M2 | SYSTOLIC BLOOD PRESSURE: 174 MMHG | WEIGHT: 119.2 LBS

## 2018-05-08 DIAGNOSIS — Z17.0 MALIGNANT NEOPLASM OF UPPER-OUTER QUADRANT OF RIGHT BREAST IN FEMALE, ESTROGEN RECEPTOR POSITIVE (HCC): ICD-10-CM

## 2018-05-08 DIAGNOSIS — K21.9 GERD WITHOUT ESOPHAGITIS: ICD-10-CM

## 2018-05-08 DIAGNOSIS — I89.0 LYMPHEDEMA: ICD-10-CM

## 2018-05-08 DIAGNOSIS — K22.2 ESOPHAGEAL STENOSIS: ICD-10-CM

## 2018-05-08 DIAGNOSIS — C50.411 MALIGNANT NEOPLASM OF UPPER-OUTER QUADRANT OF RIGHT BREAST IN FEMALE, ESTROGEN RECEPTOR POSITIVE (HCC): ICD-10-CM

## 2018-05-08 DIAGNOSIS — I10 ESSENTIAL HYPERTENSION: Primary | ICD-10-CM

## 2018-05-08 PROCEDURE — 99214 OFFICE O/P EST MOD 30 MIN: CPT | Performed by: FAMILY MEDICINE

## 2018-05-08 NOTE — PROGRESS NOTES
Assessment/Plan:         Diagnoses and all orders for this visit:    Essential hypertension  Comments:  Excellent stability of BP    GERD without esophagitis    Esophageal stenosis  Comments:  Needs followup with GI for possible EGD and dilation if symptoms persist   She wants to wait on this to see if meds help    Malignant neoplasm of upper-outer quadrant of right breast in female, estrogen receptor positive (HCC)  Comments:  F/U with Dr Adarsh June    Lymphedema  Comments:  Post surgical edema fairly stable, using sleve with benefit          Subjective:      Patient ID: Sammy Barboza is a 80 y o  female  Still having nausea after starting to eat  Not much benefit with Mylanta or Ranitidine  Appetite is fairly good and has not had any regurgitation of food or fluids            The following portions of the patient's history were reviewed and updated as appropriate: allergies, current medications, past family history, past medical history, past social history, past surgical history and problem list     Review of Systems   Constitutional: Negative for appetite change, fever and unexpected weight change  HENT: Negative  Eyes: Negative for visual disturbance  Respiratory: Negative  Negative for cough, shortness of breath and wheezing  Cardiovascular: Negative  Genitourinary: Negative for difficulty urinating and pelvic pain  Musculoskeletal: Positive for arthralgias  Skin: Positive for rash  Neurological: Negative for dizziness, weakness and headaches  Hematological: Bruises/bleeds easily  Psychiatric/Behavioral: Negative for sleep disturbance  Objective:      BP (!) 174/70 (BP Location: Left arm, Patient Position: Sitting, Cuff Size: Standard)   Pulse 76   Temp 97 6 °F (36 4 °C) (Tympanic)   Ht 5' (1 524 m)   Wt 54 1 kg (119 lb 3 2 oz)   BMI 23 28 kg/m²          Physical Exam   Constitutional: She is oriented to person, place, and time   She appears well-developed and well-nourished  Cardiovascular: Normal rate, regular rhythm and normal heart sounds  Pulmonary/Chest: Effort normal and breath sounds normal    Abdominal: Bowel sounds are normal  She exhibits no distension  Musculoskeletal: She exhibits no edema  Neurological: She is oriented to person, place, and time  Psychiatric: She has a normal mood and affect  Her behavior is normal  Judgment and thought content normal    Nursing note and vitals reviewed

## 2018-05-17 ENCOUNTER — REMOTE DEVICE CLINIC VISIT (OUTPATIENT)
Dept: CARDIOLOGY CLINIC | Facility: CLINIC | Age: 83
End: 2018-05-17
Payer: MEDICARE

## 2018-05-17 DIAGNOSIS — Z95.0 PACEMAKER: Primary | ICD-10-CM

## 2018-05-17 DIAGNOSIS — I44.2 CHB (COMPLETE HEART BLOCK) (HCC): ICD-10-CM

## 2018-05-17 PROCEDURE — 93296 REM INTERROG EVL PM/IDS: CPT | Performed by: INTERNAL MEDICINE

## 2018-05-17 PROCEDURE — 93294 REM INTERROG EVL PM/LDLS PM: CPT | Performed by: INTERNAL MEDICINE

## 2018-05-17 NOTE — PROGRESS NOTES
Results for orders placed or performed in visit on 05/17/18   Cardiac EP device report    Narrative    MDT DUAL CHAMBER PACEMAKER  CARELINK TRANSMISSION: BATTERY ADEQUATE (10 5 YRS)  AP 72 9%;  100% (DEPENDENT/CHB)  ALL AVAILABLE LEAD PARAMETERS WITHIN NORMAL LIMITS  NO SIGNIFICANT HIGH RATE EPISODES  NORMAL DEVICE FUNCTION   PL/GV

## 2018-06-22 ENCOUNTER — OFFICE VISIT (OUTPATIENT)
Dept: URGENT CARE | Facility: CLINIC | Age: 83
End: 2018-06-22
Payer: MEDICARE

## 2018-06-22 VITALS
RESPIRATION RATE: 16 BRPM | OXYGEN SATURATION: 92 % | TEMPERATURE: 97.2 F | WEIGHT: 114 LBS | DIASTOLIC BLOOD PRESSURE: 78 MMHG | BODY MASS INDEX: 22.38 KG/M2 | SYSTOLIC BLOOD PRESSURE: 136 MMHG | HEIGHT: 60 IN | HEART RATE: 72 BPM
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
## 2018-06-22 DIAGNOSIS — S50.862A TICK BITE OF FOREARM, LEFT, INITIAL ENCOUNTER: Primary | ICD-10-CM

## 2018-06-22 PROCEDURE — G0463 HOSPITAL OUTPT CLINIC VISIT: HCPCS | Performed by: EMERGENCY MEDICINE

## 2018-06-22 PROCEDURE — 99213 OFFICE O/P EST LOW 20 MIN: CPT | Performed by: EMERGENCY MEDICINE

## 2018-06-22 RX ORDER — DOXYCYCLINE HYCLATE 100 MG/1
100 CAPSULE ORAL EVERY 12 HOURS SCHEDULED
Qty: 28 CAPSULE | Refills: 0 | Status: SHIPPED | OUTPATIENT
Start: 2018-06-22 | End: 2018-07-06

## 2018-06-22 NOTE — PROGRESS NOTES
Assessment/Plan:    No problem-specific Assessment & Plan notes found for this encounter  Diagnoses and all orders for this visit:    Tick bite of forearm, left, initial encounter          Subjective:      Patient ID: Andre Hoskins is a 80 y o  female  Tick bite on L elbow 4 days ago, area now red, swollen; Pts daughter had removed tick      Tick Bite   This is a new problem  The current episode started in the past 7 days  The problem has been gradually worsening  Nothing aggravates the symptoms  She has tried nothing for the symptoms  The treatment provided no relief  The following portions of the patient's history were reviewed and updated as appropriate: current medications, past family history, past medical history, past social history, past surgical history and problem list     Review of Systems   Musculoskeletal:        Redness, swelling of L arm above elbow   All other systems reviewed and are negative  Objective:      /78   Pulse 72   Temp (!) 97 2 °F (36 2 °C)   Resp 16   Ht 5' (1 524 m)   Wt 51 7 kg (114 lb)   SpO2 92%   BMI 22 26 kg/m²          Physical Exam   Constitutional: She is oriented to person, place, and time  She appears well-developed and well-nourished  HENT:   Nose: Nose normal    Eyes: Pupils are equal, round, and reactive to light  Neck: Normal range of motion  Cardiovascular: Normal rate  Pulmonary/Chest: Effort normal    Abdominal: Soft  Musculoskeletal:        Arms:  Neurological: She is alert and oriented to person, place, and time  Skin: Skin is warm and dry  Psychiatric: She has a normal mood and affect  Her behavior is normal  Judgment and thought content normal    Nursing note and vitals reviewed

## 2018-06-25 RX ORDER — HYDROXYZINE HYDROCHLORIDE 25 MG/1
TABLET, FILM COATED ORAL
COMMUNITY
Start: 2012-02-13 | End: 2018-08-29 | Stop reason: ALTCHOICE

## 2018-06-25 RX ORDER — LORAZEPAM 0.5 MG/1
1 TABLET ORAL EVERY 6 HOURS PRN
Status: ON HOLD | COMMUNITY
Start: 2017-06-06 | End: 2018-08-08

## 2018-06-25 RX ORDER — MULTIVIT,IRON,MINERALS/LUTEIN
1 TABLET ORAL DAILY
COMMUNITY
End: 2018-08-08 | Stop reason: HOSPADM

## 2018-06-26 ENCOUNTER — OFFICE VISIT (OUTPATIENT)
Dept: FAMILY MEDICINE CLINIC | Facility: HOSPITAL | Age: 83
End: 2018-06-26
Payer: MEDICARE

## 2018-06-26 VITALS
BODY MASS INDEX: 22.81 KG/M2 | DIASTOLIC BLOOD PRESSURE: 72 MMHG | WEIGHT: 116.2 LBS | TEMPERATURE: 97.5 F | SYSTOLIC BLOOD PRESSURE: 152 MMHG | HEART RATE: 72 BPM | HEIGHT: 60 IN

## 2018-06-26 DIAGNOSIS — L98.9 SKIN LESION OF NECK: ICD-10-CM

## 2018-06-26 DIAGNOSIS — K21.9 GERD WITHOUT ESOPHAGITIS: ICD-10-CM

## 2018-06-26 DIAGNOSIS — E78.2 MIXED HYPERLIPIDEMIA: ICD-10-CM

## 2018-06-26 DIAGNOSIS — R11.0 NAUSEA: ICD-10-CM
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
## 2018-06-26 DIAGNOSIS — R73.9 HYPERGLYCEMIA: ICD-10-CM

## 2018-06-26 DIAGNOSIS — I10 ESSENTIAL HYPERTENSION: Primary | ICD-10-CM
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
## 2018-06-26 PROCEDURE — 99214 OFFICE O/P EST MOD 30 MIN: CPT | Performed by: FAMILY MEDICINE

## 2018-06-26 RX ORDER — FAMOTIDINE 20 MG/1
20 TABLET, FILM COATED ORAL 2 TIMES DAILY
Qty: 60 TABLET | Refills: 5 | Status: SHIPPED | OUTPATIENT
Start: 2018-06-26 | End: 2018-08-08 | Stop reason: HOSPADM

## 2018-06-26 NOTE — PROGRESS NOTES
Assessment/Plan:         Diagnoses and all orders for this visit:    Essential hypertension  Comments:  Excellent control    Hyperglycemia  Comments:  Recheck with next visit  Mixed hyperlipidemia  Comments:  Recheck with next visit    Tick bite, subsequent encounter  Comments:  Complete the antibiotic    GERD without esophagitis  -     famotidine (PEPCID) 20 mg tablet; Take 1 tablet (20 mg total) by mouth 2 (two) times a day    Skin lesion of neck  Comments:  Lesion appears to be consistent with a metastastic lesion - refer to Dr Cain Carrel for biopsy  Orders:  -     Ambulatory referral to Dermatology; Future    Nausea  -     famotidine (PEPCID) 20 mg tablet; Take 1 tablet (20 mg total) by mouth 2 (two) times a day          Subjective:      Patient ID: Irina Shields is a 80 y o  female  Had tick bite with red swelling of the LUE  Tolerating the Doxycycline OK  Spot on L side of neck doesn't seem to be changing but has another beside her R breast area  Always very tired    She does nap during the day, not sleeping well at night    LUE tick bite feeling better, mostly itchy at this time            The following portions of the patient's history were reviewed and updated as appropriate: allergies, current medications, past family history, past medical history, past social history, past surgical history and problem list     Review of Systems      Objective:      /72 (BP Location: Left arm, Patient Position: Sitting, Cuff Size: Standard)   Pulse 72   Temp 97 5 °F (36 4 °C) (Tympanic)   Ht 5' (1 524 m)   Wt 52 7 kg (116 lb 3 2 oz)   BMI 22 69 kg/m²          Physical Exam   Skin:        ECM rash fading

## 2018-07-16 ENCOUNTER — TELEPHONE (OUTPATIENT)
Dept: FAMILY MEDICINE CLINIC | Facility: HOSPITAL | Age: 83
End: 2018-07-16

## 2018-07-16 NOTE — TELEPHONE ENCOUNTER
PLEASE SEND TO [facility]    famotidine (PEPCID) 20 mg tablet [[id number]]         PLEASE SEND TO [facility]    PRAVASTATIN  [facility]

## 2018-07-19 ENCOUNTER — OFFICE VISIT (OUTPATIENT)
Dept: SURGICAL ONCOLOGY | Facility: HOSPITAL | Age: 83
End: 2018-07-19
Payer: MEDICARE

## 2018-07-19 VITALS
WEIGHT: 111 LBS | HEIGHT: 60 IN | RESPIRATION RATE: 16 BRPM | BODY MASS INDEX: 21.79 KG/M2 | HEART RATE: 72 BPM | TEMPERATURE: 96.8 F | DIASTOLIC BLOOD PRESSURE: 80 MMHG | SYSTOLIC BLOOD PRESSURE: 140 MMHG

## 2018-07-19 DIAGNOSIS — Z17.0 MALIGNANT NEOPLASM OF UPPER-OUTER QUADRANT OF RIGHT BREAST IN FEMALE, ESTROGEN RECEPTOR POSITIVE (HCC): Primary | ICD-10-CM

## 2018-07-19 DIAGNOSIS — C79.2 CANCER, METASTATIC TO SKIN (HCC): ICD-10-CM

## 2018-07-19 DIAGNOSIS — C50.411 MALIGNANT NEOPLASM OF UPPER-OUTER QUADRANT OF RIGHT BREAST IN FEMALE, ESTROGEN RECEPTOR POSITIVE (HCC): Primary | ICD-10-CM

## 2018-07-19 PROCEDURE — 99215 OFFICE O/P EST HI 40 MIN: CPT | Performed by: SURGERY

## 2018-07-19 NOTE — LETTER
July 19, 2018     Santosh Medrano, [address]  [address] [facility]  [address] [facility] [address]    Patient: Alexander George   YOB: 1924   Date of Visit: 7/19/2018       Dear Dr Raciel Sands: Thank you for referring Constance Longoria to me for evaluation  Below are my notes for this consultation  If you have questions, please do not hesitate to call me  I look forward to following your patient along with you  Sincerely,        Maisha Ayers MD        CC: No Recipients  Maisha Ayers MD  7/19/2018  1:58 PM  Sign at close encounter     Surgical Oncology Follow Up       [address] [facility] [address]  8/10/1924  [id number]  [address] [facility] [address]    Chief Complaint   Patient presents with    Breast Cancer     Pt is here for new dx breast cancer        Assessment/Plan   Diagnoses and all orders for this visit:    Malignant neoplasm of upper-outer quadrant of right breast in female, estrogen receptor positive ([facility] [address] )  -     CT chest abdomen pelvis wo contrast; Future    Cancer, metastatic to skin [facility])  -     CT chest abdomen pelvis wo contrast; Future        Advance Care Planning/Advance Directives:  Did not discuss  with the patient       Oncology History:       Malignant neoplasm of upper-outer quadrant of right breast in female, estrogen receptor positive ([address] )    11/13/2009 Surgery     Right breast lumpectomy, SLNB, AND         [facility]         8/31/2017 Initial Diagnosis     Malignant neoplasm of upper-outer quadrant of right breast in female, estrogen receptor positive ([address] )        Hormone Therapy     Arimidex X 5 years            History of Present Illness:  Breast cancer follow up  -Interval History: positive skin biopsy left neck    Review of Systems:  Review of Systems   Constitutional: Positive for fatigue  Negative for appetite change and fever  Eyes: Negative  Respiratory: Negative for shortness of breath  Cardiovascular: Negative  Gastrointestinal: Positive for diarrhea  Endocrine: Negative  Genitourinary: Negative  Musculoskeletal: Negative  Negative for arthralgias and myalgias  Skin:        Reports having a biopsy of the left neck and hand   Allergic/Immunologic: Negative  Neurological: Negative  Hematological: Negative  Negative for adenopathy  Does not bruise/bleed easily  Psychiatric/Behavioral: Negative          Patient Active Problem List   Diagnosis    Adjustment disorder with anxiety    Complete heart block (HCC)    GERD without esophagitis    Hyperglycemia    Hyperlipidemia    Hypertension    Impingement syndrome of left shoulder    Lymphedema    Malignant neoplasm of upper-outer quadrant of right breast in female, estrogen receptor positive ([facility] [address] )    Onychomycosis    Osteoporosis    Pruritus    Tinea pedis of both feet    Urinary urgency    Esophageal stenosis    Tick bite    Skin lesion of neck    Nausea    Cancer, metastatic to skin [facility])     Past Medical History:   Diagnosis Date    Cardiac pacemaker     GERD (gastroesophageal reflux disease)     Hearing loss     Heart block     Herpes zoster     Hx of radiation therapy     Hyperlipidemia     Hypertension     Insomnia     Lyme disease     Malignant neoplasm of cervix ([facility] [address] )     Osteoporosis     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 (HCC)     Use of anastrozole (Arimidex)      Past Surgical History:   Procedure Laterality Date    BREAST BIOPSY  10/05/2009    percutaneous needle core    BREAST SURGERY  11/13/2009    Lumpectomy     CARDIAC PACEMAKER PLACEMENT      CATARACT EXTRACTION, BILATERAL      DENTAL SURGERY      ESOPHAGOGASTRODUODENOSCOPY N/A 2/3/2017    Procedure: ESOPHAGOGASTRODUODENOSCOPY (EGD): FOREIGN BODY REMOVAL;  Surgeon: Michael Denson MD;  Location: [facility] OR;  Service:    Sylvia Higuera HERNIA REPAIR      HYSTERECTOMY      KNEE SURGERY      SENTINEL LYMPH NODE BIOPSY  11/13/2009     No family history on file  Social History     Social History    Marital status:      Spouse name: N/A    Number of children: N/A    Years of education: N/A     Occupational History    Not on file       Social History Main Topics    Smoking status: Former Smoker    Smokeless tobacco: Never Used    Alcohol use No    Drug use: No    Sexual activity: Not on file     Other Topics Concern    Not on file     Social History Narrative    No narrative on file       Current Outpatient Prescriptions:     aspirin 81 mg chewable tablet, Chew, Disp: , Rfl:     calcium citrate-vitamin D (CITRACAL+D) 315-200 MG-UNIT per tablet, Take by mouth, Disp: , Rfl:     famotidine (PEPCID) 20 mg tablet, Take 1 tablet (20 mg total) by mouth 2 (two) times a day, Disp: 60 tablet, Rfl: 5    metoprolol tartrate (LOPRESSOR) 50 mg tablet, Take 1 tablet (50 mg total) by mouth 2 (two) times a day, Disp: 180 tablet, Rfl: 3    Multiple Vitamins-Minerals (CENTRUM SILVER ADULT 50+ PO), Take by mouth, Disp: , Rfl:     pravastatin (PRAVACHOL) 20 mg tablet, Take 1 tablet (20 mg total) by mouth daily, Disp: 90 tablet, Rfl: 3    aspirin 81 MG tablet, Take 1 tablet by mouth daily, Disp: , Rfl:     ciclopirox (LOPROX) 0 77 % cream, Apply topically Twice daily, Disp: , Rfl:     hydrOXYzine HCL (ATARAX) 25 mg tablet, Take by mouth, Disp: , Rfl:     LORazepam (ATIVAN) 0 5 mg tablet, Take 1 tablet by mouth every 6 (six) hours as needed, Disp: , Rfl:     Multiple Vitamins-Minerals (CENTRUM SILVER ULTRA WOMENS) TABS, Take 1 tablet by mouth daily, Disp: , Rfl:     Omega-3 Fatty Acids (FISH OIL) 645 MG CAPS, Take by mouth, Disp: , Rfl:     Current Facility-Administered Medications:     aluminum-magnesium hydroxide-simethicone (MYLANTA) 200-200-20 mg/5 mL oral suspension 30 mL, 30 mL, Oral, Q4H PRN, Lis Fernandez MD, 30 mL at 02/10/18 1406    lidocaine viscous (XYLOCAINE) 2 % mucosal solution 15 mL, 15 mL, Swish & Spit, 4x Daily PRN, Lis Fernandez MD, 15 mL at 02/10/18 1407  No Known Allergies    The following portions of the patient's history were reviewed and updated as appropriate: allergies, current medications, past family history, past medical history, past social history, past surgical history and problem list         Vitals:    07/19/18 1314   BP: 140/80   Pulse: 72   Resp: 16   Temp: (!) 96 8 °F (36 °C)       Physical Exam   Constitutional: She is oriented to person, place, and time  She appears well-developed and well-nourished  HENT:   Head: Normocephalic and atraumatic  Neck:       Palpable and visible red lesion   Cardiovascular: Normal heart sounds  Pulmonary/Chest: Breath sounds normal  Right breast exhibits skin change  Right breast exhibits no inverted nipple, no mass, no nipple discharge and no tenderness (lumpectomy scar, 2 skin nodules adjacent to lumpectomy scar and multiple skin nodules far upper outer )  Left breast exhibits no inverted nipple, no mass, no nipple discharge, no skin change and no tenderness  Abdominal: Soft  Musculoskeletal:   Wearing a compression sleeve right arm   Lymphadenopathy:        Right axillary: No pectoral and no lateral adenopathy present  Left axillary: No pectoral and no lateral adenopathy present  Right: No supraclavicular adenopathy present  Left: No supraclavicular adenopathy present  Neurological: She is alert and oriented to person, place, and time  Skin:   As noted in neck and breast exam   Psychiatric: She has a normal mood and affect  Results:  Labs:  7/3/18 left central neck biopsy + for metastatic disease with lung or breast primary    Imaging  Last mammogram 2016 was benign; pt declined further mammograms    I reviewed the above laboratory and imaging data      Discussion/Summary: 72-year-old female with a history of right breast carcinoma stage IIIC lobular diagnosed in 2009  She was treated with lumpectomy, axillary node dissection, whole breast radiation and Arimidex  She presented initially to me with lymphedema of the right arm which was treated successfully with lymphedema therapy and compression  She now re-presented with a positive skin biopsy of the left central neck  She also has multiple skin nodules consistent with tumor nodules on the right breast   There are no masses or lymphadenopathy in the breast   The remaining portion of her exam was not revealing  We did request the outside slides for our review  I will order staging CT scans for her  If additional tissue is necessary, a skin biopsy could be performed of one of the breast lesions  Will call her with the results of her CT scans  Further recommendations will be made at that time along with our slide review

## 2018-07-19 NOTE — PROGRESS NOTES
Surgical Oncology Follow Up       [place] [address]  [facility] Ann Marmolejod  [facility] [address]  8/10/1924  [id number]  [place] [address]  [facility] Ann Esquivel  [address] [address]    Chief Complaint   Patient presents with    Breast Cancer     Pt is here for new dx breast cancer        Assessment/Plan   Diagnoses and all orders for this visit:    Malignant neoplasm of upper-outer quadrant of right breast in female, estrogen receptor positive ([facility] [address] )  -     CT chest abdomen pelvis wo contrast; Future    Cancer, metastatic to skin [facility])  -     CT chest abdomen pelvis wo contrast; Future        Advance Care Planning/Advance Directives:  Did not discuss  with the patient  Oncology History:       Malignant neoplasm of upper-outer quadrant of right breast in female, estrogen receptor positive ([address] )    11/13/2009 Surgery     Right breast lumpectomy, SLNB, AND         2010 -  Radiation     WBRT,  [place]         8/31/2017 Initial Diagnosis     Malignant neoplasm of upper-outer quadrant of right breast in female, estrogen receptor positive ([address] )        Hormone Therapy     Arimidex X 5 years            History of Present Illness:  Breast cancer follow up  -Interval History: positive skin biopsy left neck    Review of Systems:  Review of Systems   Constitutional: Positive for fatigue  Negative for appetite change and fever  Eyes: Negative  Respiratory: Negative for shortness of breath  Cardiovascular: Negative  Gastrointestinal: Positive for diarrhea  Endocrine: Negative  Genitourinary: Negative  Musculoskeletal: Negative  Negative for arthralgias and myalgias  Skin:        Reports having a biopsy of the left neck and hand   Allergic/Immunologic: Negative  Neurological: Negative  Hematological: Negative    Negative for adenopathy  Does not bruise/bleed easily  Psychiatric/Behavioral: Negative  Patient Active Problem List   Diagnosis    Adjustment disorder with anxiety    [facility]    GERD without esophagitis    Hyperglycemia    Hyperlipidemia    Hypertension    Impingement syndrome of left shoulder    Lymphedema    Malignant neoplasm of upper-outer quadrant of right breast in female, estrogen receptor positive ([facility] )    Onychomycosis    Osteoporosis    Pruritus    Tinea pedis of both feet    Urinary urgency    Esophageal stenosis    Tick bite    Skin lesion of neck    Nausea    Cancer, metastatic to skin [facility])     Past Medical History:   Diagnosis Date    Cardiac pacemaker     GERD (gastroesophageal reflux disease)     Hearing loss     Heart block     Herpes zoster     Hx of radiation therapy     Hyperlipidemia     Hypertension     Insomnia     Lyme disease     Mal[facility]ix [facility]     Osteoporosis     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 (HCC)     Use of anastrozole (Arimidex)      Past Surgical History:   Procedure Laterality Date    BREAST BIOPSY  10/05/2009    percutaneous needle core    BREAST SURGERY  11/13/2009    Lumpectomy     CARDIAC PACEMAKER PLACEMENT      CATARACT EXTRACTION, BILATERAL      DENTAL SURGERY      ESOPHAGOGASTRODUODENOSCOPY N/A 2/3/2017    Procedure: ESOPHAGOGASTRODUODENOSCOPY (EGD): FOREIGN BODY REMOVAL;  Surgeon: Santos Ayers MD;  Location: [facility] OR;  Service:     HERNIA REPAIR      HYSTERECTOMY      KNEE SURGERY      SENTINEL LYMPH NODE BIOPSY  11/13/2009     No family history on file  Social History     Social History    Marital status:      Spouse name: N/A    Number of children: N/A    Years of education: N/A     Occupational History    Not on file       Social History Main Topics    Smoking status: Former Smoker    Smokeless tobacco: Never Used    Alcohol use No    Drug use: No    Sexual activity: Not on file Other Topics Concern    Not on file     Social History Narrative    No narrative on file       Current Outpatient Prescriptions:     aspirin 81 mg chewable tablet, Chew, Disp: , Rfl:     calcium citrate-vitamin D (CITRACAL+D) 315-200 MG-UNIT per tablet, Take by mouth, Disp: , Rfl:     famotidine (PEPCID) 20 mg tablet, Take 1 tablet (20 mg total) by mouth 2 (two) times a day, Disp: 60 tablet, Rfl: 5    metoprolol tartrate (LOPRESSOR) 50 mg tablet, Take 1 tablet (50 mg total) by mouth 2 (two) times a day, Disp: 180 tablet, Rfl: 3    Multiple Vitamins-Minerals (CENTRUM SILVER ADULT 50+ PO), Take by mouth, Disp: , Rfl:     pravastatin (PRAVACHOL) 20 mg tablet, Take 1 tablet (20 mg total) by mouth daily, Disp: 90 tablet, Rfl: 3    aspirin 81 MG tablet, Take 1 tablet by mouth daily, Disp: , Rfl:     ciclopirox (LOPROX) 0 77 % cream, Apply topically Twice daily, Disp: , Rfl:     hydrOXYzine HCL (ATARAX) 25 mg tablet, Take by mouth, Disp: , Rfl:     LORazepam (ATIVAN) 0 5 mg tablet, Take 1 tablet by mouth every 6 (six) hours as needed, Disp: , Rfl:     Multiple Vitamins-Minerals (CENTRUM SILVER ULTRA WOMENS) TABS, Take 1 tablet by mouth daily, Disp: , Rfl:     Omega-3 Fatty Acids (FISH OIL) 645 MG CAPS, Take by mouth, Disp: , Rfl:     Current Facility-Administered Medications:     aluminum-magnesium hydroxide-simethicone (MYLANTA) 200-200-20 mg/5 mL oral suspension 30 mL, 30 mL, Oral, Q4H PRN, Zoe Abbasi MD, 30 mL at 02/10/18 1406    lidocaine viscous (XYLOCAINE) 2 % mucosal solution 15 mL, 15 mL, Swish & Spit, 4x Daily PRN, Zoe Abbasi MD, 15 mL at 02/10/18 1407  No Known Allergies    The following portions of the patient's history were reviewed and updated as appropriate: allergies, current medications, past family history, past medical history, past social history, past surgical history and problem list         Vitals:    07/19/18 1314   BP: 140/80   Pulse: 72   Resp: 16   Temp: (!) 96 8 °F (36 °C)       Physical Exam   Constitutional: She is oriented to person, place, and time  She appears well-developed and well-nourished  HENT:   Head: Normocephalic and atraumatic  Neck:       Palpable and visible red lesion   Cardiovascular: Normal heart sounds  Pulmonary/Chest: Breath sounds normal  Right breast exhibits skin change  Right breast exhibits no inverted nipple, no mass, no nipple discharge and no tenderness (lumpectomy scar, 2 skin nodules adjacent to lumpectomy scar and multiple skin nodules far upper outer )  Left breast exhibits no inverted nipple, no mass, no nipple discharge, no skin change and no tenderness  Abdominal: Soft  Musculoskeletal:   Wearing a compression sleeve right arm   Lymphadenopathy:        Right axillary: No pectoral and no lateral adenopathy present  Left axillary: No pectoral and no lateral adenopathy present  Right: No supraclavicular adenopathy present  Left: No supraclavicular adenopathy present  Neurological: She is alert and oriented to person, place, and time  Skin:   As noted in neck and breast exam   Psychiatric: She has a normal mood and affect  Results:  Labs:  7/3/18 left central neck biopsy + for metastatic disease with lung or breast primary    Imaging  Last mammogram 2016 was benign; pt declined further mammograms    I reviewed the above laboratory and imaging data  Discussion/Summary:  77-year-old female with a history of right breast carcinoma stage IIIC lobular diagnosed in 2009  She was treated with lumpectomy, axillary node dissection, whole breast radiation and Arimidex  She presented initially to me with lymphedema of the right arm which was treated successfully with lymphedema therapy and compression  She now re-presented with a positive skin biopsy of the left central neck    She also has multiple skin nodules consistent with tumor nodules on the right breast   There are no masses or lymphadenopathy in the breast   The remaining portion of her exam was not revealing  We did request the outside slides for our review  I will order staging CT scans for her  If additional tissue is necessary, a skin biopsy could be performed of one of the breast lesions  Will call her with the results of her CT scans  Further recommendations will be made at that time along with our slide review

## 2018-07-23 ENCOUNTER — TELEPHONE (OUTPATIENT)
Dept: FAMILY MEDICINE CLINIC | Facility: HOSPITAL | Age: 83
End: 2018-07-23

## 2018-07-23 NOTE — TELEPHONE ENCOUNTER
Pt is scheduled for a ct w/ contrast ordered by dr Deny Su is concerned that she won't be able to keep the oral barium down    Dr Dona Alexandra office wants to know if you have any problems with her having the OmniPack (iv) instead of the oral barium    pcb

## 2018-07-25 ENCOUNTER — HOSPITAL ENCOUNTER (OUTPATIENT)
Dept: CT IMAGING | Facility: HOSPITAL | Age: 83
Discharge: HOME | End: 2018-07-25
Attending: SURGERY
Payer: MEDICARE

## 2018-07-25 DIAGNOSIS — C50.411 MALIGNANT NEOPLASM OF UPPER-OUTER QUADRANT OF RIGHT BREAST IN FEMALE, ESTROGEN RECEPTOR POSITIVE (HCC): ICD-10-CM

## 2018-07-25 DIAGNOSIS — C79.2 CANCER, METASTATIC TO SKIN (HCC): ICD-10-CM

## 2018-07-25 DIAGNOSIS — Z17.0 MALIGNANT NEOPLASM OF UPPER-OUTER QUADRANT OF RIGHT BREAST IN FEMALE, ESTROGEN RECEPTOR POSITIVE (HCC): ICD-10-CM

## 2018-07-25 PROCEDURE — 71250 CT THORAX DX C-: CPT

## 2018-07-25 PROCEDURE — 74176 CT ABD & PELVIS W/O CONTRAST: CPT

## 2018-07-27 DIAGNOSIS — C79.2 CANCER, METASTATIC TO SKIN (HCC): Primary | ICD-10-CM

## 2018-07-30 ENCOUNTER — OFFICE VISIT (OUTPATIENT)
Dept: HEMATOLOGY ONCOLOGY | Facility: HOSPITAL | Age: 83
End: 2018-07-30
Payer: MEDICARE

## 2018-07-30 VITALS
DIASTOLIC BLOOD PRESSURE: 76 MMHG | SYSTOLIC BLOOD PRESSURE: 138 MMHG | OXYGEN SATURATION: 92 % | RESPIRATION RATE: 16 BRPM | HEART RATE: 67 BPM | WEIGHT: 110 LBS | TEMPERATURE: 96.9 F | HEIGHT: 60 IN | BODY MASS INDEX: 21.6 KG/M2

## 2018-07-30 DIAGNOSIS — C50.919 BREAST CANCER (HCC): Primary | ICD-10-CM

## 2018-07-30 PROCEDURE — 99205 OFFICE O/P NEW HI 60 MIN: CPT | Performed by: INTERNAL MEDICINE

## 2018-07-30 NOTE — PROGRESS NOTES
Oncology Outpatient Consult Note  Brooklyn Gaines 80 y o  female MRN: @ Encounter: [id number]        Date:  7/30/2018        CC:  Metastatic breast cancer      HPI:  Brooklyn Gaines is seen for initial consultation 7/30/2018 regarding Newly diagnosed metastatic breast cancer  Per the record she was diagnosed with a T2 N3 M0 right-sided breast cancer in 2009  She had a lumpectomy and axillary dissection in November 2009 followed by hormonal therapy and radiation  The patient herself does not remember being on hormonal therapy but per the records she was on this  Regardless more recently she was found to have some lesions An outside facility where she had a biopsy done of the skin lesion in her left neck which actually revealed metastatic carcinoma  It was consistent with her breast primary but estrogen staining and HER-2 staining was not done and we will order this  A CT scan was done which did not show major visceral disease  She does however have lesions on her right breast  She is 80years of age and is going to be 80 in a few days  As far as symptoms are concerned she states she is forgetful but overall does not feel any pain  Denies any nausea or vomiting  Denies any other complaints  Her 14 point review of systems today was negative          Cancer Staging:  Cancer Staging  Malignant neoplasm of upper-outer quadrant of right breast in female, estrogen receptor positive ([facility] [address] )  Staging form: Breast, AJCC 7th Edition  - Pathologic: Stage IIIC (T2, N3, cM0) - Signed by Jacob Hills MD on 7/19/2018      Molecular Testing:     Previous Hematologic/ Oncologic History:       Malignant neoplasm of upper-outer quadrant of right breast in female, estrogen receptor positive ([facility] [address] )    11/13/2009 Surgery     Right breast lumpectomy, SLNB, AND         2010 -  Radiation     WBRT,  [place]         8/31/2017 Initial Diagnosis     Malignant neoplasm of upper-outer quadrant of right breast in female, estrogen receptor positive [facility])        Hormone Therapy     Arimidex X 5 years            Test Results:    Imaging: Ct Chest Abdomen Pelvis Wo Contrast    Result Date: 7/27/2018  Narrative: CT CHEST, ABDOMEN AND PELVIS WITHOUT IV CONTRAST INDICATION:   C50 411: Malignant neoplasm of upper-outer quadrant of right female breast Z17 0: Estrogen receptor positive status (ER+) C79 2: Secondary malignant neoplasm of skin  COMPARISON:  January 4, 2006 TECHNIQUE: CT examination of the chest, abdomen and pelvis was performed without intravenous contrast   Axial, sagittal, and coronal 2D reformatted images were created from the source data and submitted for interpretation  Radiation dose length product (DLP) for this visit:  292 48 mGy-cm   This examination, like all CT scans performed in the [facility], was performed utilizing techniques to minimize radiation dose exposure, including the use of iterative  reconstruction and automated exposure control  Enteric contrast was administered  FINDINGS: CHEST LUNGS:  There is a generally linear area of opacification identified within the right lung apex with appearance favoring some scarring  There are no other significant lung findings  The airways are clear  PLEURA:  Unremarkable  HEART/GREAT VESSELS:  Heart is top normal size  No pericardial effusion  Aortic caliber within normal limits  Moderately heavy atherosclerotic calcification of aorta and coronary arteries is noted  Patient has a pacemaker  MEDIASTINUM AND KAMINI:  No lymphadenopathy  CHEST WALL AND LOWER NECK:   Pacemaker in the left upper chest wall  There are surgical clips in the right axillary region  There appears to be some minimal postsurgical change in the upper outer quadrant of the right breast   There is no dominant or spiculated appearing mass visible within either breast at this time  A small asymmetry is noted in the left posterior upper outer quadrant on image 33 series 2    It has a generally rounded to oval shape and is about a centimeter diameter  There is no axillary lymphadenopathy  Thoracic inlet is unremarkable  ABDOMEN LIVER/BILIARY TREE:  Unremarkable  GALLBLADDER:  The gallbladder is extremely distended  No gallstones are seen  No gross evidence of wall thickening or surrounding inflammation  SPLEEN:  Unremarkable  PANCREAS:  Unremarkable  ADRENAL GLANDS:  Unremarkable  KIDNEYS/URETERS:  No kidney stones or hydronephrosis  Assessment limited without contrast  STOMACH AND BOWEL:  The stomach is not well distended which might account for the generalized thick appearance of the gastric wall  The possibility of infiltrative disease or gastritis is not entirely excluded  This might require further assessment with either upper gastrointestinal series or upper endoscopy  There is relatively minimal colonic diverticulosis without diverticulitis  There is no bowel obstruction  APPENDIX:  No findings to suggest appendicitis  ABDOMINOPELVIC CAVITY:  No ascites or free intraperitoneal air  No lymphadenopathy  VESSELS:  Atherosclerotic changes are present  No evidence of aneurysm  There is some ectasia of the aorta PELVIS REPRODUCTIVE ORGANS:  Patient is status post hysterectomy  URINARY BLADDER:  Unremarkable  ABDOMINAL WALL/INGUINAL REGIONS:  Abdominal wall appears intact  In the right groin adjacent to the area of the external iliac vasculature, best seen on coronal reformatted images, there appear to be some small lymph nodes as well as an indeterminate soft tissue density which has a generally tubular shaped but is not conclusively a bowel loop  Does not filled with contrast and does not appear connected to the bowel  It measures 2 8 x 4 7 cm  This potentially could represent some scar tissue if the  patient had prior surgery in the region  A group of matted lymph nodes could not be conclusively excluded  OSSEOUS STRUCTURES:  There is a pectus excavatum deformity    There are multilevel spinal degenerative changes  Sclerotic areas within the vertebral column appear to most likely be due to degenerative changes  No conclusive evidence of metastatic disease in the bones  There are chronic bilateral L5 pars interarticularis fractures with mild displacement producing grade 1 anterolisthesis L5 on S1  No conclusive evidence of significant central canal stenoses  Impression: Indeterminate soft tissue density in the right lower quadrant near the groin as detailed above which might be some scarring although matted lymph nodes or other type of soft tissue lesion could not be entirely excluded  This might require short interval  follow-up or further investigation with a PET scan if there is high clinical suspicion for neoplasm  This seems to be an unlikely location for metastatic disease from breast cancer  Thick-walled appearance of the stomach which is not well distended  This might simply be artifact of under distention although infiltrative disease within the gastric wall or gastritis or not excluded  Further workup may be required either with upper GI series or upper endoscopy  Very distended gallbladder without any visible obstructing calcified stones or secondary signs of inflammation  Correlate for any right upper quadrant pain  If present, ultrasound might be helpful  Minimal colonic diverticulosis without diverticulitis  Postop changes of the right breast and axilla  Small asymmetry in the left upper outer quadrant of the breast   The patient seems overdue for mammogram in left she has had a mammogram elsewhere  Her most recent prior mamm[facility] at [facility] was from August 15, 2016   Workstation performed: [id number]       Labs:   Lab Results   Component Value Date    WBC 8 72 04/30/2018    HGB 13 7 04/30/2018    HCT 41 3 04/30/2018    MCV 92 04/30/2018     04/30/2018     Lab Results   Component Value Date     04/30/2018    K 4 5 04/30/2018     04/30/2018    CO2 33 (H) 04/30/2018    ANIONGAP 6 04/30/2018    BUN 24 04/30/2018    CREATININE 1 03 04/30/2018    GLUCOSE 75 07/19/2017    GLUF 86 04/30/2018    CALCIUM 8 6 04/30/2018    AST 29 04/30/2018    ALT 19 04/30/2018    ALKPHOS 53 04/30/2018    PROT 8 2 04/30/2018    BILITOT 0 50 04/30/2018    EGFR 47 04/30/2018       ROS: As stated in history of present illness otherwise her 14 point review of systems today was negative          Active Problems:   Patient Active Problem List   Diagnosis    Adjustment disorder with anxiety    Complete heart block (HCC)    GERD without esophagitis    Hyperglycemia    Hyperlipidemia    Hypertension    Impingement syndrome of left shoulder    Lymphedema    Malignant neoplasm of upper-outer quadrant of right breast in female, estrogen receptor positive ([address] )    Onychomycosis    Osteoporosis    Pruritus    Tinea pedis of both feet    Urinary urgency    Esophageal stenosis    Tick bite    Skin lesion of neck    Nausea    Cancer, metastatic to skin [facility])       Past Medical History:   Past Medical History:   Diagnosis Date    Breast cancer ([facility] [address] )     last assessed 11/19/17     Cardiac pacemaker     GERD (gastroesophageal reflux disease)     Hearing loss     Heart block     S/P pacemaker     Herpes zoster     last assessed 7/30/13    Hx of radiation therapy 2010    last assessed 11/19/17     Hyperlipidemia     Hypertension     Insomnia     last assessed 7/30/13, resolved 6/16/15    Lyme disease     last assessed 7/1/13    Malignant neoplasm of cervix ([facility] [address] )     last assessed 6/27/12, resolved 2/1/17     Osteoporosis     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 ([facility] [address] )     last assessed 6/27/12    Use of anastrozole (Arimidex)     took x 5yrs, last assessed 11/19/17        Surgical History:   Past Surgical History:   Procedure Laterality Date    BREAST BIOPSY  10/05/2009    percutaneous needle core    BREAST SURGERY Right 11/13/2009    Lumpectomy     CARDIAC PACEMAKER PLACEMENT      dual chamber, last assessed 2/26/16    CATARACT EXTRACTION, BILATERAL      DENTAL SURGERY      ESOPHAGOGASTRODUODENOSCOPY N/A 2/3/2017    Procedure: ESOPHAGOGASTRODUODENOSCOPY (EGD): FOREIGN BODY REMOVAL;  Surgeon: Krystyna Morton MD;  Location:  MAIN OR;  Service:    [id number] Right 01/28/2015    Inguinal, with mesh    HYSTERECTOMY      KNEE SURGERY      resolved 1999    LYMPHADENECTOMY  11/13/2009    Axillary     SENTINEL LYMPH NODE BIOPSY Right 11/13/2009       Family History:    Family History   Problem Relation Age of Onset    Heart disease Mother     Heart disease Father        Cancer-related family history is not on file  Social History:   Social History     Social History    Marital status:      Spouse name: N/A    Number of children: N/A    Years of education: N/A     Occupational History    Not on file       Social History Main Topics    Smoking status: Former Smoker    Smokeless tobacco: Never Used    Alcohol use No    Drug use: No    Sexual activity: Not on file     Other Topics Concern    Not on file     Social History Narrative    Always uses seat belt        Current Medications:   Current Outpatient Prescriptions   Medication Sig Dispense Refill    aspirin 81 mg chewable tablet Chew      aspirin 81 MG tablet Take 1 tablet by mouth daily      calcium citrate-vitamin D (CITRACAL+D) 315-200 MG-UNIT per tablet Take by mouth      ciclopirox (LOPROX) 0 77 % cream Apply topically Twice daily      famotidine (PEPCID) 20 mg tablet Take 1 tablet (20 mg total) by mouth 2 (two) times a day 60 tablet 5    hydrOXYzine HCL (ATARAX) 25 mg tablet Take by mouth      LORazepam (ATIVAN) 0 5 mg tablet Take 1 tablet by mouth every 6 (six) hours as needed      metoprolol tartrate (LOPRESSOR) 50 mg tablet Take 1 tablet (50 mg total) by mouth 2 (two) times a day 180 tablet 3    Multiple Vitamins-Minerals (CENTRUM SILVER ADULT 50+ PO) Take by mouth      Multiple Vitamins-Minerals (CENTRUM SILVER ULTRA WOMENS) TABS Take 1 tablet by mouth daily      Omega-3 Fatty Acids (FISH OIL) 645 MG CAPS Take by mouth      pravastatin (PRAVACHOL) 20 mg tablet Take 1 tablet (20 mg total) by mouth daily 90 tablet 3     Current Facility-Administered Medications   Medication Dose Route Frequency Provider Last Rate Last Dose    aluminum-magnesium hydroxide-simethicone (MYLANTA) 200-200-20 mg/5 mL oral suspension 30 mL  30 mL Oral Q4H PRN Gabriele Shook MD   30 mL at 02/10/18 1406    lidocaine viscous (XYLOCAINE) 2 % mucosal solution 15 mL  15 mL Swish & Spit 4x Daily PRN Gabriele Shook MD   15 mL at 02/10/18 1407       Allergies: No Known Allergies      Physical Exam:    Body surface area is 1 45 meters squared  Wt Readings from Last 3 Encounters:   07/30/18 49 9 kg (110 lb)   07/19/18 50 3 kg (111 lb)   06/26/18 52 7 kg (116 lb 3 2 oz)        Temp Readings from Last 3 Encounters:   07/30/18 (!) 96 9 °F (36 1 °C) (Tympanic)   07/19/18 (!) 96 8 °F (36 °C)   06/26/18 97 5 °F (36 4 °C) (Tympanic)        BP Readings from Last 3 Encounters:   07/30/18 138/76   07/19/18 140/80   06/26/18 152/72         Pulse Readings from Last 3 Encounters:   07/30/18 67   07/19/18 72   06/26/18 72       Physical Exam     Constitutional   General appearance: No acute distress, well appearing and well nourished  Eyes   Conjunctiva and lids: No swelling, erythema or discharge  Pupils and irises: Equal, round and reactive to light  Ears, Nose, Mouth, and Throat   External inspection of ears and nose: Normal     Nasal mucosa, septum, and turbinates: Normal without edema or erythema  Oropharynx: Normal with no erythema, edema, exudate or lesions  Pulmonary   Respiratory effort: No increased work of breathing or signs of respiratory distress  Auscultation of lungs: Clear to auscultation  Cardiovascular   Palpation of heart: Normal PMI, no thrills      Auscultation of heart: Normal rate and rhythm, normal S1 and S2, without murmurs  Examination of extremities for edema and/or varicosities: Normal     Carotid pulses: Normal     Abdomen   Abdomen: Non-tender, no masses  Liver and spleen: No hepatomegaly or splenomegaly  Lymphatic   Palpation of lymph nodes in neck: No lymphadenopathy  Musculoskeletal   Gait and station: Normal     Digits and nails: Normal without clubbing or cyanosis  Inspection/palpation of joints, bones, and muscles: Normal     Skin   Skin and subcutaneous tissue: Patient does have multiple nodules on her right breast near the lumpectomy scar and upper outer quadrant  Neurologic   Cranial nerves: Cranial nerves 2-12 intact  Sensation: No sensory loss  Psychiatric   Orientation to person, place, and time: Normal     Mood and affect: Normal           Assessment/ Plan:      22-year-old female with a history of right breast carcinoma stage IIIC lobular diagnosed in 2009  She was treated with lumpectomy, axillary node dissection, whole breast radiation and Arimidex  She presented To our colleagues in breast surgery with lymphedema of the right arm which was treated successfully with lymphedema therapy and compression  She now re-presented with a positive skin biopsy of the left central neck  She also has multiple skin nodules consistent with tumor nodules on the right breast   There are no masses or lymphadenopathy in the breast   The remaining portion of her exam was not revealing  At this point it appears she has metastatic breast cancer  She has some GI discomfort and some changes on her CT scan and is agreeable to having an EGD so I will arrange this  I will put her on Faslodex for her breast cancer  Her estrogen receptor and HER-2 was not reported on the biopsy so I will have this done to ensure she still has hormone receptor positive disease  If this comes back as receptor negative then a aby discussion will need to be had as she is 80 and not really a good candidate for chemotherapy   At this point however we will proceed with antiestrogen therapy  I'll see her back in 4-6 weeks  She will have started treatment by then  By then we will also have had her estrogen receptor status checked along with her HER-2 status and had her seen by Our colleagues in GI for consideration of an endoscopy  Went over the risks benefits and alternatives of Faslodex  I explained the possible side effects include hot flashes, vaginal dryness and other symptoms of Anti-estrogen therapy  The patient agreed to proceed  We will set her up to start The patient will sign a consent form today  Goals and Barriers:  Current Goal:  Prolong Survival from breast Cancer  Barriers: None  Patient's Capacity to Self Care:  Patient  able to self care  Portions of the record may have been created with voice recognition software   Occasional wrong word or "sound a like" substitutions may have occurred due to the inherent limitations of voice recognition software   Read the chart carefully and recognize, using context, where substitutions have occurred

## 2018-07-30 NOTE — LETTER
July 30, 2018     MD Sierra Alvarado  [id number] [facility] [address] [address]    Patient: Amaya Mayorga   YOB: 1924   Date of Visit: 7/30/2018       Dear Dr Tri Mancini: Thank you for referring Julius Giles to me for evaluation  Below are my notes for this consultation  If you have questions, please do not hesitate to call me  I look forward to following your patient along with you  Sincerely,        Chante Moore MD        CC: No Recipients  Chante Moore MD  7/30/2018  3:10 PM  Sign at close encounter     Oncology Outpatient Consult Note  Amaya Mayorga 80 y o  female MRN: @ Encounter: [id number]        Date:  7/30/2018        CC:  Metastatic breast cancer      HPI:  Amaya Mayorga is seen for initial consultation 7/30/2018 regarding Newly diagnosed metastatic breast cancer  Per the record she was diagnosed with a T2 N3 M0 right-sided breast cancer in 2009  She had a lumpectomy and axillary dissection in November 2009 followed by hormonal therapy and radiation  The patient herself does not remember being on hormonal therapy but per the records she was on this  Regardless more recently she was found to have some lesions An outside facility where she had a biopsy done of the skin lesion in her left neck which actually revealed metastatic carcinoma  It was consistent with her breast primary but estrogen staining and HER-2 staining was not done and we will order this  A CT scan was done which did not show major visceral disease  She does however have lesions on her right breast  She is 80years of age and is going to be 80 in a few days  As far as symptoms are concerned she states she is forgetful but overall does not feel any pain  Denies any nausea or vomiting  Denies any other complaints  Her 14 point review of systems today was negative          Cancer Staging:  Cancer Staging  Malignant neoplasm of upper-outer quadrant of right breast in female, estrogen receptor positive [facility])  Staging form: Breast, AJCC 7th Edition  - Pathologic: Stage IIIC (T2, N3, cM0) - Signed by Ana Luisa Appiah MD on 7/19/2018      Molecular Testing:     Previous Hematologic/ Oncologic History:       Malignant neoplasm of upper-outer quadrant of right breast in female, estrogen receptor positive ([facility] [address] )    11/13/2009 Surgery     Right breast lumpectomy, SLNB, AND         2010 -  Radiation     WBRT,  [place]         8/31/2017 Initial Diagnosis     Malignant neoplasm of upper-outer quadrant of right breast in female, estrogen receptor positive ([facility] [address] )        Hormone Therapy     Arimidex X 5 years            Test Results:    Imaging: Ct Chest Abdomen Pelvis Wo Contrast    Result Date: 7/27/2018  Narrative: CT CHEST, ABDOMEN AND PELVIS WITHOUT IV CONTRAST INDICATION:   C50 411: Malignant neoplasm of upper-outer quadrant of right female breast Z17 0: Estrogen receptor positive status (ER+) C79 2: Secondary malignant neoplasm of skin  COMPARISON:  January 4, 2006 TECHNIQUE: CT examination of the chest, abdomen and pelvis was performed without intravenous contrast   Axial, sagittal, and coronal 2D reformatted images were created from the source data and submitted for interpretation  Radiation dose length product (DLP) for this visit:  292 48 mGy-cm   This examination, like all CT scans performed in the [facility], was performed utilizing techniques to minimize radiation dose exposure, including the use of iterative  reconstruction and automated exposure control  Enteric contrast was administered  FINDINGS: CHEST LUNGS:  There is a generally linear area of opacification identified within the right lung apex with appearance favoring some scarring  There are no other significant lung findings  The airways are clear  PLEURA:  Unremarkable  HEART/GREAT VESSELS:  Heart is top normal size  No pericardial effusion  Aortic caliber within normal limits    Moderately heavy atherosclerotic calcification of aorta and coronary arteries is noted  Patient has a pacemaker  MEDIASTINUM AND KAMINI:  No lymphadenopathy  CHEST WALL AND LOWER NECK:   Pacemaker in the left upper chest wall  There are surgical clips in the right axillary region  There appears to be some minimal postsurgical change in the upper outer quadrant of the right breast   There is no dominant or spiculated appearing mass visible within either breast at this time  A small asymmetry is noted in the left posterior upper outer quadrant on image 33 series 2  It has a generally rounded to oval shape and is about a centimeter diameter  There is no axillary lymphadenopathy  Thoracic inlet is unremarkable  ABDOMEN LIVER/BILIARY TREE:  Unremarkable  GALLBLADDER:  The gallbladder is extremely distended  No gallstones are seen  No gross evidence of wall thickening or surrounding inflammation  SPLEEN:  Unremarkable  PANCREAS:  Unremarkable  ADRENAL GLANDS:  Unremarkable  KIDNEYS/URETERS:  No kidney stones or hydronephrosis  Assessment limited without contrast  STOMACH AND BOWEL:  The stomach is not well distended which might account for the generalized thick appearance of the gastric wall  The possibility of infiltrative disease or gastritis is not entirely excluded  This might require further assessment with either upper gastrointestinal series or upper endoscopy  There is relatively minimal colonic diverticulosis without diverticulitis  There is no bowel obstruction  APPENDIX:  No findings to suggest appendicitis  ABDOMINOPELVIC CAVITY:  No ascites or free intraperitoneal air  No lymphadenopathy  VESSELS:  Atherosclerotic changes are present  No evidence of aneurysm  There is some ectasia of the aorta PELVIS REPRODUCTIVE ORGANS:  Patient is status post hysterectomy  URINARY BLADDER:  Unremarkable  ABDOMINAL WALL/INGUINAL REGIONS:  Abdominal wall appears intact    In the right groin adjacent to the area of the external iliac vasculature, best seen on coronal reformatted images, there appear to be some small lymph nodes as well as an indeterminate soft tissue density which has a generally tubular shaped but is not conclusively a bowel loop  Does not filled with contrast and does not appear connected to the bowel  It measures 2 8 x 4 7 cm  This potentially could represent some scar tissue if the  patient had prior surgery in the region  A group of matted lymph nodes could not be conclusively excluded  OSSEOUS STRUCTURES:  There is a pectus excavatum deformity  There are multilevel spinal degenerative changes  Sclerotic areas within the vertebral column appear to most likely be due to degenerative changes  No conclusive evidence of metastatic disease in the bones  There are chronic bilateral L5 pars interarticularis fractures with mild displacement producing grade 1 anterolisthesis L5 on S1  No conclusive evidence of significant central canal stenoses  Impression: Indeterminate soft tissue density in the right lower quadrant near the groin as detailed above which might be some scarring although matted lymph nodes or other type of soft tissue lesion could not be entirely excluded  This might require short interval  follow-up or further investigation with a PET scan if there is high clinical suspicion for neoplasm  This seems to be an unlikely location for metastatic disease from breast cancer  Thick-walled appearance of the stomach which is not well distended  This might simply be artifact of under distention although infiltrative disease within the gastric wall or gastritis or not excluded  Further workup may be required either with upper GI series or upper endoscopy  Very distended gallbladder without any visible obstructing calcified stones or secondary signs of inflammation  Correlate for any right upper quadrant pain  If present, ultrasound might be helpful  Minimal colonic diverticulosis without diverticulitis   Postop changes of the right breast and axilla  Small asymmetry in the left upper outer quadrant of the breast   The patient seems overdue for mammogram in left she has had a mammogram elsewhere  Her most recent prior mammogram at [facility] was from August 15, 2016  Workstation performed: [id number]       Labs:   Lab Results   Component Value Date    WBC 8 72 04/30/2018    HGB 13 7 04/30/2018    HCT 41 3 04/30/2018    MCV 92 04/30/2018     04/30/2018     Lab Results   Component Value Date     04/30/2018    K 4 5 04/30/2018     04/30/2018    CO2 33 (H) 04/30/2018    ANIONGAP 6 04/30/2018    BUN 24 04/30/2018    CREATININE 1 03 04/30/2018    GLUCOSE 75 07/19/2017    GLUF 86 04/30/2018    CALCIUM 8 6 04/30/2018    AST 29 04/30/2018    ALT 19 04/30/2018    ALKPHOS 53 04/30/2018    PROT 8 2 04/30/2018    BILITOT 0 50 04/30/2018    EGFR 47 04/30/2018       ROS: As stated in history of present illness otherwise her 14 point review of systems today was negative          Active Problems:   Patient Active Problem List   Diagnosis    Adjustment disorder with anxiety    Complete heart block (HCC)    GERD without esophagitis    Hyperglycemia    Hyperlipidemia    Hypertension    Impingement syndrome of left shoulder    Lymphedema    Malignant neoplasm of upper-outer quadrant of right breast in female, estrogen receptor positive ([address] )    Onychomycosis    Osteoporosis    Pruritus    Tinea pedis of both feet    Urinary urgency    Esophageal stenosis    Tick bite    Skin lesion of neck    Nausea    Cancer, metastatic to skin [facility])       Past Medical History:   Past Medical History:   Diagnosis Date    Breast cancer ([address] )     last assessed 11/19/17     Cardiac pacemaker     GERD (gastroesophageal reflux disease)     Hearing loss     Heart block     S/P pacemaker     Herpes zoster     last assessed 7/30/13    Hx of radiation therapy 2010    last assessed 11/19/17     Hyperlipidemia     Hypertension     Insomnia     last assessed 7/30/13, resolved 6/16/15    Lyme disease     last assessed 7/1/13    Malignant neoplasm of cervix ([facility] [address] )     last assessed 6/27/12, resolved 2/1/17     Osteoporosis     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 ([facility] )     last assessed 6/27/12    Use of anastrozole (Arimidex)     took x 5yrs, last assessed 11/19/17        Surgical History:   Past Surgical History:   Procedure Laterality Date    BREAST BIOPSY  10/05/2009    percutaneous needle core    BREAST SURGERY Right 11/13/2009    Lumpectomy     CARDIAC PACEMAKER PLACEMENT      dual chamber, last assessed 2/26/16    CATARACT EXTRACTION, BILATERAL      DENTAL SURGERY      ESOPHAGOGASTRODUODENOSCOPY N/A 2/3/2017    Procedure: ESOPHAGOGASTRODUODENOSCOPY (EGD): FOREIGN BODY REMOVAL;  Surgeon: Doug Robison MD;  Location:  MAIN OR;  Service:    [address] [place] [address] [address] Right 01/28/2015    Inguinal, with mesh    HYSTERECTOMY      KNEE SURGERY      resolved 1999    LYMPHADENECTOMY  11/13/2009    Axillary     SENTINEL LYMPH NODE BIOPSY Right 11/13/2009       Family History:    Family History   Problem Relation Age of Onset    Heart disease Mother     Heart disease Father        Cancer-related family history is not on file  Social History:   Social History     Social History    Marital status:      Spouse name: N/A    Number of children: N/A    Years of education: N/A     Occupational History    Not on file       Social History Main Topics    Smoking status: Former Smoker    Smokeless tobacco: Never Used    Alcohol use No    Drug use: No    Sexual activity: Not on file     Other Topics Concern    Not on file     Social History Narrative    Always uses seat belt        Current Medications:   Current Outpatient Prescriptions   Medication Sig Dispense Refill    aspirin 81 mg chewable tablet Chew      aspirin 81 MG tablet Take 1 tablet by mouth daily      calcium citrate-vitamin D (CITRACAL+D) 315-200 MG-UNIT per tablet Take by mouth      ciclopirox (LOPROX) 0 77 % cream Apply topically Twice daily      famotidine (PEPCID) 20 mg tablet Take 1 tablet (20 mg total) by mouth 2 (two) times a day 60 tablet 5    hydrOXYzine HCL (ATARAX) 25 mg tablet Take by mouth      LORazepam (ATIVAN) 0 5 mg tablet Take 1 tablet by mouth every 6 (six) hours as needed      metoprolol tartrate (LOPRESSOR) 50 mg tablet Take 1 tablet (50 mg total) by mouth 2 (two) times a day 180 tablet 3    Multiple Vitamins-Minerals (CENTRUM SILVER ADULT 50+ PO) Take by mouth      Multiple Vitamins-Minerals (CENTRUM SILVER ULTRA WOMENS) TABS Take 1 tablet by mouth daily      Omega-3 Fatty Acids (FISH OIL) 645 MG CAPS Take by mouth      pravastatin (PRAVACHOL) 20 mg tablet Take 1 tablet (20 mg total) by mouth daily 90 tablet 3     Current Facility-Administered Medications   Medication Dose Route Frequency Provider Last Rate Last Dose    aluminum-magnesium hydroxide-simethicone (MYLANTA) 200-200-20 mg/5 mL oral suspension 30 mL  30 mL Oral Q4H PRN Amparo Nuñez MD   30 mL at 02/10/18 1406    lidocaine viscous (XYLOCAINE) 2 % mucosal solution 15 mL  15 mL Swish & Spit 4x Daily PRN Amparo Nuñez MD   15 mL at 02/10/18 1407       Allergies: No Known Allergies      Physical Exam:    Body surface area is 1 45 meters squared  Wt Readings from Last 3 Encounters:   07/30/18 49 9 kg (110 lb)   07/19/18 50 3 kg (111 lb)   06/26/18 52 7 kg (116 lb 3 2 oz)        Temp Readings from Last 3 Encounters:   07/30/18 (!) 96 9 °F (36 1 °C) (Tympanic)   07/19/18 (!) 96 8 °F (36 °C)   06/26/18 97 5 °F (36 4 °C) (Tympanic)        BP Readings from Last 3 Encounters:   07/30/18 138/76   07/19/18 140/80   06/26/18 152/72         Pulse Readings from Last 3 Encounters:   07/30/18 67   07/19/18 72   06/26/18 72       Physical Exam     Constitutional   General appearance: No acute distress, well appearing and well nourished  Eyes   Conjunctiva and lids: No swelling, erythema or discharge      Pupils and irises: Equal, round and reactive to light  Ears, Nose, Mouth, and Throat   External inspection of ears and nose: Normal     Nasal mucosa, septum, and turbinates: Normal without edema or erythema  Oropharynx: Normal with no erythema, edema, exudate or lesions  Pulmonary   Respiratory effort: No increased work of breathing or signs of respiratory distress  Auscultation of lungs: Clear to auscultation  Cardiovascular   Palpation of heart: Normal PMI, no thrills  Auscultation of heart: Normal rate and rhythm, normal S1 and S2, without murmurs  Examination of extremities for edema and/or varicosities: Normal     Carotid pulses: Normal     Abdomen   Abdomen: Non-tender, no masses  Liver and spleen: No hepatomegaly or splenomegaly  Lymphatic   Palpation of lymph nodes in neck: No lymphadenopathy  Musculoskeletal   Gait and station: Normal     Digits and nails: Normal without clubbing or cyanosis  Inspection/palpation of joints, bones, and muscles: Normal     Skin   Skin and subcutaneous tissue: Patient does have multiple nodules on her right breast near the lumpectomy scar and upper outer quadrant  Neurologic   Cranial nerves: Cranial nerves 2-12 intact  Sensation: No sensory loss  Psychiatric   Orientation to person, place, and time: Normal     Mood and affect: Normal           Assessment/ Plan:      41-year-old female with a history of right breast carcinoma stage IIIC lobular diagnosed in 2009  She was treated with lumpectomy, axillary node dissection, whole breast radiation and Arimidex  She presented To our colleagues in breast surgery with lymphedema of the right arm which was treated successfully with lymphedema therapy and compression  She now re-presented with a positive skin biopsy of the left central neck    She also has multiple skin nodules consistent with tumor nodules on the right breast   There are no masses or lymphadenopathy in the breast   The remaining portion of her exam was not revealing  At this point it appears she has metastatic breast cancer  She has some GI discomfort and some changes on her CT scan and is agreeable to having an EGD so I will arrange this  I will put her on Faslodex for her breast cancer  Her estrogen receptor and HER-2 was not reported on the biopsy so I will have this done to ensure she still has hormone receptor positive disease  If this comes back as receptor negative then a aby discussion will need to be had as she is 80 and not really a good candidate for chemotherapy  At this point however we will proceed with antiestrogen therapy  I'll see her back in 4-6 weeks  She will have started treatment by then  By then we will also have had her estrogen receptor status checked along with her HER-2 status and had her seen by Our colleagues in GI for consideration of an endoscopy  Went over the risks benefits and alternatives of Faslodex  I explained the possible side effects include hot flashes, vaginal dryness and other symptoms of Anti-estrogen therapy  The patient agreed to proceed  We will set her up to start The patient will sign a consent form today  Goals and Barriers:  Current Goal:  Prolong Survival from breast Cancer  Barriers: None  Patient's Capacity to Self Care:  Patient  able to self care  Portions of the record may have been created with voice recognition software   Occasional wrong word or "sound a like" substitutions may have occurred due to the inherent limitations of voice recognition software   Read the chart carefully and recognize, using context, where substitutions have occurred

## 2018-07-31 ENCOUNTER — TELEPHONE (OUTPATIENT)
Dept: HEMATOLOGY ONCOLOGY | Facility: CLINIC | Age: 83
End: 2018-07-31

## 2018-07-31 RX ORDER — LAMOTRIGINE 25 MG/1
250 TABLET ORAL ONCE
Status: DISCONTINUED | OUTPATIENT
Start: 2018-08-01 | End: 2018-08-04 | Stop reason: HOSPADM

## 2018-07-31 NOTE — TELEPHONE ENCOUNTER
Patient is coming in to see infusion tomorrow  Can we set her up to see GI and give her the appointment when she is there  She was confused when she got the phone call  She understands now and is agreeable

## 2018-07-31 NOTE — TELEPHONE ENCOUNTER
Gastroenterology calls saying that they attempted to set up an appointment for this patient  She refused  She did not know why they wanted to give her an appt  GI reports she also asked them if she could take a sleeping pill before her infusion room appointment tomorrow  They advised that she call us with that question  GI asks if we can call patient and explain the reason for the referral to them

## 2018-08-01 ENCOUNTER — HOSPITAL ENCOUNTER (INPATIENT)
Facility: HOSPITAL | Age: 83
LOS: 7 days | Discharge: NURSING, REHAB OR LONG-TERM CARE | DRG: 378 | End: 2018-08-08
Attending: EMERGENCY MEDICINE | Admitting: INTERNAL MEDICINE
Payer: MEDICARE

## 2018-08-01 ENCOUNTER — APPOINTMENT (EMERGENCY)
Dept: CT IMAGING | Facility: HOSPITAL | Age: 83
DRG: 378 | End: 2018-08-01
Payer: MEDICARE

## 2018-08-01 ENCOUNTER — HOSPITAL ENCOUNTER (OUTPATIENT)
Dept: INFUSION CENTER | Facility: HOSPITAL | Age: 83
Discharge: HOME | End: 2018-08-01

## 2018-08-01 ENCOUNTER — TELEPHONE (OUTPATIENT)
Dept: GASTROENTEROLOGY | Facility: CLINIC | Age: 83
End: 2018-08-01

## 2018-08-01 DIAGNOSIS — K92.2 GI BLEED: Primary | ICD-10-CM

## 2018-08-01 DIAGNOSIS — Z17.0 MALIGNANT NEOPLASM OF UPPER-OUTER QUADRANT OF RIGHT BREAST IN FEMALE, ESTROGEN RECEPTOR POSITIVE (HCC): ICD-10-CM

## 2018-08-01 DIAGNOSIS — C50.411 MALIGNANT NEOPLASM OF UPPER-OUTER QUADRANT OF RIGHT BREAST IN FEMALE, ESTROGEN RECEPTOR POSITIVE (HCC): ICD-10-CM

## 2018-08-01 DIAGNOSIS — M25.561 RIGHT KNEE PAIN: ICD-10-CM

## 2018-08-01 DIAGNOSIS — K29.70 GASTRITIS: ICD-10-CM

## 2018-08-01 PROBLEM — D62 ACUTE BLOOD LOSS ANEMIA: Status: ACTIVE | Noted: 2018-08-01

## 2018-08-01 PROBLEM — K92.1 GASTROINTESTINAL HEMORRHAGE WITH MELENA: Status: ACTIVE | Noted: 2018-08-01

## 2018-08-01 LAB
ALBUMIN SERPL BCP-MCNC: 3 G/DL (ref 3.5–5)
ALP SERPL-CCNC: 44 U/L (ref 46–116)
ALT SERPL W P-5'-P-CCNC: 15 U/L (ref 12–78)
ANION GAP SERPL CALCULATED.3IONS-SCNC: 7 MMOL/L (ref 4–13)
APTT PPP: 26 SECONDS (ref 24–36)
AST SERPL W P-5'-P-CCNC: 36 U/L (ref 5–45)
BASOPHILS # BLD AUTO: 0.05 THOUSANDS/ΜL (ref 0–0.1)
BASOPHILS NFR BLD AUTO: 1 % (ref 0–1)
BILIRUB SERPL-MCNC: 0.4 MG/DL (ref 0.2–1)
BUN SERPL-MCNC: 43 MG/DL (ref 5–25)
CALCIUM SERPL-MCNC: 8.8 MG/DL (ref 8.3–10.1)
CHLORIDE SERPL-SCNC: 100 MMOL/L (ref 100–108)
CO2 SERPL-SCNC: 31 MMOL/L (ref 21–32)
CREAT SERPL-MCNC: 1.1 MG/DL (ref 0.6–1.3)
EOSINOPHIL # BLD AUTO: 0.1 THOUSAND/ΜL (ref 0–0.61)
EOSINOPHIL NFR BLD AUTO: 1 % (ref 0–6)
ERYTHROCYTE [DISTWIDTH] IN BLOOD BY AUTOMATED COUNT: 13.8 % (ref 11.6–15.1)
GFR SERPL CREATININE-BSD FRML MDRD: 43 ML/MIN/1.73SQ M
GLUCOSE SERPL-MCNC: 155 MG/DL (ref 65–140)
HCT VFR BLD AUTO: 27.1 % (ref 34.8–46.1)
HCT VFR BLD AUTO: 31.2 % (ref 34.8–46.1)
HGB BLD-MCNC: 10.2 G/DL (ref 11.5–15.4)
HGB BLD-MCNC: 8.9 G/DL (ref 11.5–15.4)
IMM GRANULOCYTES # BLD AUTO: 0.04 THOUSAND/UL (ref 0–0.2)
IMM GRANULOCYTES NFR BLD AUTO: 1 % (ref 0–2)
INR PPP: 1.11 (ref 0.86–1.17)
LIPASE SERPL-CCNC: 350 U/L (ref 73–393)
LYMPHOCYTES # BLD AUTO: 1.6 THOUSANDS/ΜL (ref 0.6–4.47)
LYMPHOCYTES NFR BLD AUTO: 22 % (ref 14–44)
MCH RBC QN AUTO: 29.9 PG (ref 26.8–34.3)
MCHC RBC AUTO-ENTMCNC: 32.7 G/DL (ref 31.4–37.4)
MCV RBC AUTO: 92 FL (ref 82–98)
MONOCYTES # BLD AUTO: 0.56 THOUSAND/ΜL (ref 0.17–1.22)
MONOCYTES NFR BLD AUTO: 8 % (ref 4–12)
NEUTROPHILS # BLD AUTO: 4.92 THOUSANDS/ΜL (ref 1.85–7.62)
NEUTS SEG NFR BLD AUTO: 67 % (ref 43–75)
NRBC BLD AUTO-RTO: 0 /100 WBCS
PLATELET # BLD AUTO: 211 THOUSANDS/UL (ref 149–390)
PMV BLD AUTO: 9.7 FL (ref 8.9–12.7)
POTASSIUM SERPL-SCNC: 4.6 MMOL/L (ref 3.5–5.3)
PROT SERPL-MCNC: 6.9 G/DL (ref 6.4–8.2)
PROTHROMBIN TIME: 13.7 SECONDS (ref 11.8–14.2)
RBC # BLD AUTO: 3.41 MILLION/UL (ref 3.81–5.12)
SODIUM SERPL-SCNC: 138 MMOL/L (ref 136–145)
WBC # BLD AUTO: 7.27 THOUSAND/UL (ref 4.31–10.16)

## 2018-08-01 PROCEDURE — 85018 HEMOGLOBIN: CPT | Performed by: INTERNAL MEDICINE

## 2018-08-01 PROCEDURE — 99285 EMERGENCY DEPT VISIT HI MDM: CPT

## 2018-08-01 PROCEDURE — C9113 INJ PANTOPRAZOLE SODIUM, VIA: HCPCS | Performed by: EMERGENCY MEDICINE

## 2018-08-01 PROCEDURE — 74176 CT ABD & PELVIS W/O CONTRAST: CPT

## 2018-08-01 PROCEDURE — 85610 PROTHROMBIN TIME: CPT | Performed by: EMERGENCY MEDICINE

## 2018-08-01 PROCEDURE — 85730 THROMBOPLASTIN TIME PARTIAL: CPT | Performed by: EMERGENCY MEDICINE

## 2018-08-01 PROCEDURE — 96374 THER/PROPH/DIAG INJ IV PUSH: CPT

## 2018-08-01 PROCEDURE — 94664 DEMO&/EVAL PT USE INHALER: CPT

## 2018-08-01 PROCEDURE — 80053 COMPREHEN METABOLIC PANEL: CPT | Performed by: EMERGENCY MEDICINE

## 2018-08-01 PROCEDURE — 85025 COMPLETE CBC W/AUTO DIFF WBC: CPT | Performed by: EMERGENCY MEDICINE

## 2018-08-01 PROCEDURE — 94760 N-INVAS EAR/PLS OXIMETRY 1: CPT

## 2018-08-01 PROCEDURE — 99223 1ST HOSP IP/OBS HIGH 75: CPT | Performed by: INTERNAL MEDICINE

## 2018-08-01 PROCEDURE — 36415 COLL VENOUS BLD VENIPUNCTURE: CPT | Performed by: EMERGENCY MEDICINE

## 2018-08-01 PROCEDURE — 96361 HYDRATE IV INFUSION ADD-ON: CPT

## 2018-08-01 PROCEDURE — 85014 HEMATOCRIT: CPT | Performed by: INTERNAL MEDICINE

## 2018-08-01 PROCEDURE — 83690 ASSAY OF LIPASE: CPT | Performed by: EMERGENCY MEDICINE

## 2018-08-01 RX ORDER — PANTOPRAZOLE SODIUM 40 MG/1
40 INJECTION, POWDER, FOR SOLUTION INTRAVENOUS ONCE
Status: COMPLETED | OUTPATIENT
Start: 2018-08-01 | End: 2018-08-01

## 2018-08-01 RX ORDER — ONDANSETRON 2 MG/ML
4 INJECTION INTRAMUSCULAR; INTRAVENOUS ONCE
Status: DISCONTINUED | OUTPATIENT
Start: 2018-08-01 | End: 2018-08-08 | Stop reason: HOSPADM

## 2018-08-01 RX ORDER — METOPROLOL TARTRATE 50 MG/1
50 TABLET, FILM COATED ORAL 2 TIMES DAILY
Status: DISCONTINUED | OUTPATIENT
Start: 2018-08-01 | End: 2018-08-08 | Stop reason: HOSPADM

## 2018-08-01 RX ORDER — SODIUM CHLORIDE 9 MG/ML
50 INJECTION, SOLUTION INTRAVENOUS CONTINUOUS
Status: DISCONTINUED | OUTPATIENT
Start: 2018-08-01 | End: 2018-08-04

## 2018-08-01 RX ADMIN — SODIUM CHLORIDE 8 MG/HR: 9 INJECTION, SOLUTION INTRAVENOUS at 14:36

## 2018-08-01 RX ADMIN — PANTOPRAZOLE SODIUM 40 MG: 40 INJECTION, POWDER, FOR SOLUTION INTRAVENOUS at 11:03

## 2018-08-01 RX ADMIN — METOPROLOL TARTRATE 50 MG: 50 TABLET ORAL at 17:29

## 2018-08-01 RX ADMIN — SODIUM CHLORIDE 75 ML/HR: 0.9 INJECTION, SOLUTION INTRAVENOUS at 14:15

## 2018-08-01 RX ADMIN — PANTOPRAZOLE SODIUM 40 MG: 40 INJECTION, POWDER, FOR SOLUTION INTRAVENOUS at 13:00

## 2018-08-01 RX ADMIN — SODIUM CHLORIDE 1000 ML: 0.9 INJECTION, SOLUTION INTRAVENOUS at 11:03

## 2018-08-01 NOTE — RESPIRATORY THERAPY NOTE
RT Protocol Note  Roger Ruiz 80 y o  female MRN: [id number]  Unit/Bed#: [address] [place] [address] Encounter: [id number]    Assessment    Principal Problem:    Gastrointestinal hemorrhage with melena  Active Problems:    GERD without esophagitis    Hyperlipidemia    Hypertension    Malignant neoplasm of upper-outer quadrant of right breast in female, estrogen receptor positive ([facility] )    Acute blood loss anemia      Home Pulmonary Medications:  none       Past Medical History:   Diagnosis Date    Breast cancer ([facility] )     last assessed 11/19/17     Cardiac pacemaker     GERD (gastroesophageal reflux disease)     Hearing loss     Heart block     S/P pacemaker     Herpes zoster     last assessed 7/30/13    Hx of radiation therapy 2010    last assessed 11/19/17     Hyperlipidemia     Hypertension     Insomnia     last assessed 7/30/13, resolved 6/16/15    Lyme disease     last assessed 7/1/13    Malignant neoplasm of cervix ([address] [address] )     last assessed 6/27/12, resolved 2/1/17     Osteoporosis     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 ([address] [place] [address] )     last assessed 6/27/12    Use of anastrozole (Arimidex)     took x 5yrs, last assessed 11/19/17      Social History     Social History    Marital status:      Spouse name: N/A    Number of children: N/A    Years of education: N/A     Social History Main Topics    Smoking status: Former Smoker    Smokeless tobacco: Never Used    Alcohol use No    Drug use: No    Sexual activity: Not Asked     Other Topics Concern    None     Social History Narrative    Always uses seat belt        Subjective         Objective    Physical Exam:   Assessment Type: Assess only  General Appearance: Awake  Respiratory Pattern: Normal  Chest Assessment: Chest expansion symmetrical  Bilateral Breath Sounds: Clear, Diminished  Cough: None    Vitals:  Blood pressure 131/60, pulse 69, temperature 98 5 °F (36 9 °C), temperature source Oral, resp   rate 18, height 5' (1 524 m), weight 47 7 kg (105 lb 2 6 oz), SpO2 94 %           Imaging and other studies: I have personally reviewed pertinent reports              Plan    Respiratory Plan: Discontinue Protocol

## 2018-08-01 NOTE — ED NOTES
Bedside while provider did rectal exam  Hemoccult was positive  Pt tolerated well        Simon Wei RN  08/01/18 [id number]

## 2018-08-01 NOTE — ED PROVIDER NOTES
History  Chief Complaint   Patient presents with    Black or Bloody Stool     pt presents to ED via EMS from home c/o of black loose clumpy stools  Bright red ring around the toilet of blood     Patient brought in by ambulance for sudden epigastric pain with dark blood bowel movements twice just prior to arrival   Lives with daughter  Hx of breast cancer and recent CT chest abd pelvis 7-25 18 with gastritis and a gallstone  Takes Mylanta for gastritis  Takes baby aspirin daily  Prior to Admission Medications   Prescriptions Last Dose Informant Patient Reported? Taking?    LORazepam (ATIVAN) 0 5 mg tablet  Self Yes No   Sig: Take 1 tablet by mouth every 6 (six) hours as needed   Multiple Vitamins-Minerals (CENTRUM SILVER ADULT 50+ PO)  Self Yes No   Sig: Take by mouth   Multiple Vitamins-Minerals (CENTRUM SILVER ULTRA WOMENS) TABS  Self Yes No   Sig: Take 1 tablet by mouth daily   Omega-3 Fatty Acids (FISH OIL) 645 MG CAPS  Self Yes No   Sig: Take by mouth   aspirin 81 MG tablet  Self Yes No   Sig: Take 1 tablet by mouth daily   aspirin 81 mg chewable tablet  Self Yes No   Sig: Chew   calcium citrate-vitamin D (CITRACAL+D) 315-200 MG-UNIT per tablet  Self Yes No   Sig: Take by mouth   famotidine (PEPCID) 20 mg tablet  Self No No   Sig: Take 1 tablet (20 mg total) by mouth 2 (two) times a day   hydrOXYzine HCL (ATARAX) 25 mg tablet  Self Yes No   Sig: Take by mouth   metoprolol tartrate (LOPRESSOR) 50 mg tablet  Self No No   Sig: Take 1 tablet (50 mg total) by mouth 2 (two) times a day   pravastatin (PRAVACHOL) 20 mg tablet  Self No No   Sig: Take 1 tablet (20 mg total) by mouth daily      Facility-Administered Medications Last Administration Doses Remaining   aluminum-magnesium hydroxide-simethicone (MYLANTA) 200-200-20 mg/5 mL oral suspension 30 mL 2/10/2018  2:06 PM    lidocaine viscous (XYLOCAINE) 2 % mucosal solution 15 mL 2/10/2018  2:07 PM           Past Medical History:   Diagnosis Date    Breast cancer ([facility] )     last assessed 11/19/17     Cardiac pacemaker     GERD (gastroesophageal reflux disease)     Hearing loss     Heart block     S/P pacemaker     Herpes zoster     last assessed 7/30/13    Hx of radiation therapy 2010    last assessed 11/19/17     Hyperlipidemia     Hypertension     Insomnia     last assessed 7/30/13, resolved 6/16/15    Lyme disease     last assessed 7/1/13    Malignant neoplasm of cervix ([facility] )     last assessed 6/27/12, resolved 2/1/17     Osteoporosis     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 ([facility] )     last assessed 6/27/12    Use of anastrozole (Arimidex)     took x 5yrs, last assessed 11/19/17        Past Surgical History:   Procedure Laterality Date    BREAST BIOPSY  10/05/2009    percutaneous needle core    BREAST SURGERY Right 11/13/2009    Lumpectomy     CARDIAC PACEMAKER PLACEMENT      dual chamber, last assessed 2/26/16    CATARACT EXTRACTION, BILATERAL      DENTAL SURGERY      ESOPHAGOGASTRODUODENOSCOPY N/A 2/3/2017    Procedure: ESOPHAGOGASTRODUODENOSCOPY (EGD): FOREIGN BODY REMOVAL;  Surgeon: Sarkis Sosa MD;  [facility];  Service:     HERNIA REPAIR Right 01/28/2015    Inguinal, with mesh    HYSTERECTOMY      KNEE SURGERY      resolved 1999    LYMPHADENECTOMY  11/13/2009    Axillary     SENTINEL LYMPH NODE BIOPSY Right 11/13/2009       Family History   Problem Relation Age of Onset    Heart disease Mother     Heart disease Father      I have reviewed and agree with the history as documented  Social History   Substance Use Topics    Smoking status: Former Smoker    Smokeless tobacco: Never Used    Alcohol use No        Review of Systems   All other systems reviewed and are negative  Physical Exam  Physical Exam   Constitutional: She appears well-developed and well-nourished  She appears cachectic     HENT:   Mouth/Throat: Oropharynx is clear and moist    Eyes: Conjunctivae and EOM are normal  Pupils are equal, round, and reactive to light    Neck: Normal range of motion  Neck supple  No spinous process tenderness present  Cardiovascular: Normal rate, regular rhythm, normal heart sounds and intact distal pulses  Pulmonary/Chest: Effort normal and breath sounds normal  No respiratory distress  She has no wheezes  Left chest pacemaker in place   Abdominal: Soft  Bowel sounds are normal  She exhibits abdominal bruit  She exhibits no distension and no pulsatile midline mass  There is generalized tenderness  There is no rebound and no guarding  Genitourinary: Rectal exam shows external hemorrhoid and guaiac positive stool  Rectal exam shows no tenderness  Musculoskeletal: Normal range of motion  Lymphadenopathy:   Right axilla and upper outer breast with firm immobile mass   Neurological: She is alert  She has normal strength  No sensory deficit  GCS eye subscore is 4  GCS verbal subscore is 5  GCS motor subscore is 6  Skin: Skin is warm and dry  No rash noted  Psychiatric: She has a normal mood and affect  Nursing note and vitals reviewed        Vital Signs  ED Triage Vitals   Temperature Pulse Respirations Blood Pressure SpO2   08/01/18 0940 08/01/18 0940 08/01/18 0940 08/01/18 0940 08/01/18 0940   97 5 °F (36 4 °C) 76 17 130/63 95 %      Temp Source Heart Rate Source Patient Position - Orthostatic VS BP Location FiO2 (%)   08/01/18 0940 08/01/18 1115 08/01/18 0940 08/01/18 0940 --   Temporal Monitor Lying Right arm       Pain Score       08/01/18 0940       8           Vitals:    08/01/18 0940 08/01/18 1100 08/01/18 1115 08/01/18 1355   BP: 130/63 112/56 119/59 165/69   Pulse: 76 77  87   Patient Position - Orthostatic VS: Lying  Sitting Lying       Visual Acuity      ED Medications  Medications   iodixanol (VISIPAQUE) 320 MG/ML injection 100 mL (not administered)   pantoprazole (PROTONIX) 80 mg in sodium chloride 0 9 % 100 mL infusion (8 mg/hr Intravenous New Bag 8/1/18 1436)   metoprolol tartrate (LOPRESSOR) tablet 50 mg (not administered)   sodium chloride 0 9 % infusion (not administered)   ondansetron (ZOFRAN) injection 4 mg (not administered)   sodium chloride 0 9 % bolus 1,000 mL (0 mL Intravenous Stopped 8/1/18 1226)   pantoprazole (PROTONIX) injection 40 mg (40 mg Intravenous Given 8/1/18 1103)   pantoprazole (PROTONIX) injection 40 mg (40 mg Intravenous Given 8/1/18 1300)       Diagnostic Studies  Results Reviewed     Procedure Component Value Units Date/Time    CMP [[id number]]  (Abnormal) Collected:  08/01/18 0959    Lab Status:  Final result Specimen:  Blood from Arm, Left Updated:  08/01/18 1045     Sodium 138 mmol/L      Potassium 4 6 mmol/L      Chloride 100 mmol/L      CO2 31 mmol/L      Anion Gap 7 mmol/L      BUN 43 (H) mg/dL      Creatinine 1 10 mg/dL      Glucose 155 (H) mg/dL      Calcium 8 8 mg/dL      AST 36 U/L      ALT 15 U/L      Alkaline Phosphatase 44 (L) U/L      Total Protein 6 9 g/dL      Albumin 3 0 (L) g/dL      Total Bilirubin 0 40 mg/dL      eGFR 43 ml/min/1 73sq m     Narrative:         National Kidney Disease Education Program recommendations are as follows:  GFR calculation is accurate only with a steady state creatinine  Chronic Kidney disease less than 60 ml/min/1 73 sq  meters  Kidney failure less than 15 ml/min/1 73 sq  meters      Lipase [[id number]]  (Normal) Collected:  08/01/18 0959    Lab Status:  Final result Specimen:  Blood from Arm, Left Updated:  08/01/18 1045     Lipase 350 u/L     APTT [[id number]]  (Normal) Collected:  08/01/18 0959    Lab Status:  Final result Specimen:  Blood from Arm, Left Updated:  08/01/18 1035     PTT 26 seconds     Protime-INR [[id number]]  (Normal) Collected:  08/01/18 0959    Lab Status:  Final result Specimen:  Blood from Arm, Left Updated:  08/01/18 1035     Protime 13 7 seconds      INR 1 11    CBC and differential [[id number]]  (Abnormal) Collected:  08/01/18 0959    Lab Status:  Final result Specimen:  Blood from Arm, Left Updated:  08/01/18 1022     WBC 7 27 Thousand/uL      RBC 3 41 (L) Million/uL      Hemoglobin 10 2 (L) g/dL      Hematocrit 31 2 (L) %      MCV 92 fL      MCH 29 9 pg      MCHC 32 7 g/dL      RDW 13 8 %      MPV 9 7 fL      Platelets 924 Thousands/uL      nRBC 0 /100 WBCs      Neutrophils Relative 67 %      Immat GRANS % 1 %      Lymphocytes Relative 22 %      Monocytes Relative 8 %      Eosinophils Relative 1 %      Basophils Relative 1 %      Neutrophils Absolute 4 92 Thousands/µL      Immature Grans Absolute 0 04 Thousand/uL      Lymphocytes Absolute 1 60 Thousands/µL      Monocytes Absolute 0 56 Thousand/µL      Eosinophils Absolute 0 10 Thousand/µL      Basophils Absolute 0 05 Thousands/µL                  CT abdomen pelvis wo contrast   Final Result by Earl Rice MD (08/01 1232)      Diffuse thickening of the wall the stomach suggestive of gastritis or infiltrative disease in the stomach, similar from July 25, 2018  Colonic diverticulosis without evidence of acute diverticulitis  Marked fluid-filled distention of the gallbladder without noncontrast CT evidence of calcified stones, gallbladder wall thickening, or pericholecystic inflammatory edema  This is unchanged from July 25, 2018        Workstation performed: [id number]                    Procedures  Procedures       Phone Contacts  ED Phone Contact    ED Course  ED Course as of Aug 01 1506   Wed Aug 01, 2018   1202 Will call Claudette if gets admitted    200 Paged Karyle Starks                                MDM  Number of Diagnoses or Management Options  Gastritis: established and worsening  GI bleed: new and requires workup     Amount and/or Complexity of Data Reviewed  Clinical lab tests: ordered and reviewed  Tests in the radiology section of CPT®: ordered and reviewed  Obtain history from someone other than the patient: yes  Discuss the patient with other providers: yes    Patient Progress  Patient progress: improved    CritCare Time    Disposition  Final diagnoses:   GI bleed Gastritis     Time reflects when diagnosis was documented in both MDM as applicable and the Disposition within this note     Time User Action Codes Description Comment    8/1/2018 12:37 PM Amadou Corona [K92 2] GI bleed     8/1/2018 12:37 PM Amadou Corona [K29 70] Gastritis       ED Disposition     ED Disposition Condition Comment    Admit  Case was discussed with Lui Akers** and the patient's admission status was agreed to be Admission Status: inpatient status to the service of Dr Lewis Feng   Follow-up Information    None         Current Discharge Medication List      CONTINUE these medications which have NOT CHANGED    Details   aspirin 81 mg chewable tablet Chew      aspirin 81 MG tablet Take 1 tablet by mouth daily      calcium citrate-vitamin D (CITRACAL+D) 315-200 MG-UNIT per tablet Take by mouth      famotidine (PEPCID) 20 mg tablet Take 1 tablet (20 mg total) by mouth 2 (two) times a day  Qty: 60 tablet, Refills: 5    Associated Diagnoses: GERD without esophagitis; Nausea      hydrOXYzine HCL (ATARAX) 25 mg tablet Take by mouth      LORazepam (ATIVAN) 0 5 mg tablet Take 1 tablet by mouth every 6 (six) hours as needed      metoprolol tartrate (LOPRESSOR) 50 mg tablet Take 1 tablet (50 mg total) by mouth 2 (two) times a day  Qty: 180 tablet, Refills: 3    Associated Diagnoses: Essential hypertension      !! Multiple Vitamins-Minerals (CENTRUM SILVER ADULT 50+ PO) Take by mouth      !! Multiple Vitamins-Minerals (CENTRUM SILVER ULTRA WOMENS) TABS Take 1 tablet by mouth daily      Omega-3 Fatty Acids (FISH OIL) 645 MG CAPS Take by mouth      pravastatin (PRAVACHOL) 20 mg tablet Take 1 tablet (20 mg total) by mouth daily  Qty: 90 tablet, Refills: 3    Associated Diagnoses: Mixed hyperlipidemia       !! - Potential duplicate medications found  Please discuss with provider  No discharge procedures on file      ED Provider  Electronically Signed by           Felipa Raygoza DO  08/01/18 2649

## 2018-08-01 NOTE — ED NOTES
Spoke to Jay in pharmacy  Protonix drip is still being prepared   Told her to send drip to patient's room on the floor       Hayden Alvarado RN  08/01/18 7045

## 2018-08-01 NOTE — H&P
History and Physical - Tish Garcia 80 y o  female MRN: [id number]  Unit/Bed#: ED 06 Encounter: [id number]    Assessment/Plan     Assessment:  Principal Problem:    Gastrointestinal hemorrhage with melena  Active Problems:    GERD without esophagitis    Hyperlipidemia    Hypertension    Malignant neoplasm of upper-outer quadrant of right breast in female, estrogen receptor positive ([facility] )    Acute blood loss anemia      Plan:  Admit step-down unit Protonix drip  GI consultation possible EGD  Of note she did have an abnormality noted on CT scan possibly consistent with infiltrative process  Check H&H q 6 hours  Will keep NPO for now    Transfusion consent form was obtained  She requests to be a level 3 DNR  I attempted to call the daughter left a message on her answering machine  Will  require greater than 2 midnight stay  History of Present Illness   HPI: Tish Garcia is a 80y o  year old female who presents with melena which started today  Patient has has remote history of peptic ulcer disease and history of GERD  Patient noticed some abdominal pain last day or so and today had 2-3 melanotic bowel movements  Patient denies any lightheadedness or dizziness  The patient is on a daily aspirin daily  Of note the patient was supposed to start chemotherapy today for recurrence metastatic breast carcinoma  She had breast carcinoma in 2009 as developed a recent recurrence in lymph nodes in her neck  Review of Systems   Eyes: Negative  Respiratory: Negative  Cardiovascular: Negative  Gastrointestinal: Positive for blood in stool  Genitourinary: Negative  Neurological: Negative          Historical Information   Past Medical History:   Diagnosis Date    Breast cancer ([facility] )     last assessed 11/19/17     Cardiac pacemaker     GERD (gastroesophageal reflux disease)     Hearing loss     Heart block     S/P pacemaker     Herpes zoster     last assessed 7/30/13    Hx of radiation therapy 2010    last assessed 11/19/17     Hyperlipidemia     Hypertension     Insomnia     last assessed 7/30/13, resolved 6/16/15    Lyme disease     last assessed 7/1/13    Malignant neoplasm of cervix ([facility] )     last assessed 6/27/12, resolved 2/1/17     Osteoporosis     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 ([facility] )     last assessed 6/27/12    Use of anastrozole (Arimidex)     took x 5yrs, last assessed 11/19/17      Past Surgical History:   Procedure Laterality Date    BREAST BIOPSY  10/05/2009    percutaneous needle core    BREAST SURGERY Right 11/13/2009    Lumpectomy     CARDIAC PACEMAKER PLACEMENT      dual chamber, last assessed 2/26/16    CATARACT EXTRACTION, BILATERAL      DENTAL SURGERY      ESOPHAGOGASTRODUODENOSCOPY N/A 2/3/2017    Procedure: ESOPHAGOGASTRODUODENOSCOPY (EGD): FOREIGN BODY REMOVAL;  Surgeon: Peggy Lai MD;  [facility]: [facility];  Service:     HERNIA REPAIR Right 01/28/2015    Inguinal, with mesh    HYSTERECTOMY      KNEE SURGERY      resolved 1999    LYMPHADENECTOMY  11/13/2009    Axillary     SENTINEL LYMPH NODE BIOPSY Right 11/13/2009     Social History   History   Alcohol Use No     History   Drug Use No     History   Smoking Status    Former Smoker   Smokeless Tobacco    Never Used     Family History:   Family History   Problem Relation Age of Onset    Heart disease Mother     Heart disease Father        Meds/Allergies      Current Facility-Administered Medications   Medication Dose Route Frequency    aluminum-magnesium hydroxide-simethicone (MYLANTA) 200-200-20 mg/5 mL oral suspension 30 mL  30 mL Oral Q4H PRN    iodixanol (VISIPAQUE) 320 MG/ML injection 100 mL  100 mL Intravenous Once in imaging    lidocaine viscous (XYLOCAINE) 2 % mucosal solution 15 mL  15 mL Swish & Spit 4x Daily PRN    pantoprazole (PROTONIX) 80 mg in sodium chloride 0 9 % 100 mL infusion  8 mg/hr Intravenous Continuous    pantoprazole (PROTONIX) injection 40 mg  40 mg Intravenous Once Facility-Administered Medications Ordered in Other Encounters   Medication Dose Route Frequency    alteplase (CATHFLO) injection 2 mg  2 mg Intracatheter PRN    fulvestrant (FASLODEX) IM injection 250 mg  250 mg Intramuscular Once    And    fulvestrant (FASLODEX) IM injection 250 mg  250 mg Intramuscular Once    heparin lock flush 100 units/mL injection 300 Units  300 Units Intracatheter Q1H PRN         (Not in a hospital admission)  No Known Allergies    Objective   Vitals: Blood pressure 119/59, pulse 77, temperature 97 5 °F (36 4 °C), temperature source Temporal, resp  rate 18, height 5' (1 524 m), weight 49 9 kg (110 lb 0 2 oz), SpO2 93 %  No intake or output data in the 24 hours ending 08/01/18 1314  Invasive Devices     Peripheral Intravenous Line            Peripheral IV 08/01/18 Left Antecubital less than 1 day                Physical Exam   Constitutional: She appears well-developed and well-nourished  HENT:   Head: Normocephalic and atraumatic  Neck: No JVD present  No thyromegaly present  Cardiovascular: Normal rate and regular rhythm  Murmur heard  Pulmonary/Chest: Effort normal and breath sounds normal    Abdominal: Soft  Bowel sounds are normal  There is tenderness (Epigastric)  Musculoskeletal: Normal range of motion  She exhibits no edema  Skin: Skin is warm and dry  Vitals reviewed        Lab Results:   Admission on 08/01/2018   Component Date Value    WBC 08/01/2018 7 27     RBC 08/01/2018 3 41*    Hemoglobin 08/01/2018 10 2*    Hematocrit 08/01/2018 31 2*    MCV 08/01/2018 92     MCH 08/01/2018 29 9     MCHC 08/01/2018 32 7     RDW 08/01/2018 13 8     MPV 08/01/2018 9 7     Platelets 29/27/9649 211     nRBC 08/01/2018 0     Neutrophils Relative 08/01/2018 67     Immat GRANS % 08/01/2018 1     Lymphocytes Relative 08/01/2018 22     Monocytes Relative 08/01/2018 8     Eosinophils Relative 08/01/2018 1     Basophils Relative 08/01/2018 1     Neutrophils Absolute 08/01/2018 4 92     Immature Grans Absolute 08/01/2018 0 04     Lymphocytes Absolute 08/01/2018 1 60     Monocytes Absolute 08/01/2018 0 56     Eosinophils Absolute 08/01/2018 0 10     Basophils Absolute 08/01/2018 0 05     Sodium 08/01/2018 138     Potassium 08/01/2018 4 6     Chloride 08/01/2018 100     CO2 08/01/2018 31     Anion Gap 08/01/2018 7     BUN 08/01/2018 43*    Creatinine 08/01/2018 1 10     Glucose 08/01/2018 155*    Calcium 08/01/2018 8 8     AST 08/01/2018 36     ALT 08/01/2018 15     Alkaline Phosphatase 08/01/2018 44*    Total Protein 08/01/2018 6 9     Albumin 08/01/2018 3 0*    Total Bilirubin 08/01/2018 0 40     eGFR 08/01/2018 43     Lipase 08/01/2018 350     PTT 08/01/2018 26     Protime 08/01/2018 13 7     INR 08/01/2018 1 11      Imaging Studies: I have personally reviewed pertinent reports  EKG, Pathology, and Other Studies: I have personally reviewed pertinent reports  VTE  Prophylaxis: Algorithmic determination of appropriate prophylaxis determined  This note has been constructed using a voice recognition system         Avni Leiva MD

## 2018-08-02 ENCOUNTER — ANESTHESIA (INPATIENT)
Dept: PERIOP | Facility: HOSPITAL | Age: 83
DRG: 378 | End: 2018-08-02
Payer: MEDICARE

## 2018-08-02 ENCOUNTER — ANESTHESIA EVENT (INPATIENT)
Dept: PERIOP | Facility: HOSPITAL | Age: 83
DRG: 378 | End: 2018-08-02
Payer: MEDICARE

## 2018-08-02 PROBLEM — K92.2 GI BLEED: Status: ACTIVE | Noted: 2018-08-01

## 2018-08-02 LAB
ABO GROUP BLD: NORMAL
ANION GAP SERPL CALCULATED.3IONS-SCNC: 9 MMOL/L (ref 4–13)
BUN SERPL-MCNC: 28 MG/DL (ref 5–25)
CALCIUM SERPL-MCNC: 8.4 MG/DL (ref 8.3–10.1)
CHLORIDE SERPL-SCNC: 107 MMOL/L (ref 100–108)
CO2 SERPL-SCNC: 24 MMOL/L (ref 21–32)
CREAT SERPL-MCNC: 0.87 MG/DL (ref 0.6–1.3)
ERYTHROCYTE [DISTWIDTH] IN BLOOD BY AUTOMATED COUNT: 14 % (ref 11.6–15.1)
GFR SERPL CREATININE-BSD FRML MDRD: 58 ML/MIN/1.73SQ M
GLUCOSE SERPL-MCNC: 78 MG/DL (ref 65–140)
HCT VFR BLD AUTO: 24.4 % (ref 34.8–46.1)
HCT VFR BLD AUTO: 25.4 % (ref 34.8–46.1)
HCT VFR BLD AUTO: 27.3 % (ref 34.8–46.1)
HCT VFR BLD AUTO: 27.4 % (ref 34.8–46.1)
HGB BLD-MCNC: 8 G/DL (ref 11.5–15.4)
HGB BLD-MCNC: 8.1 G/DL (ref 11.5–15.4)
HGB BLD-MCNC: 8.9 G/DL (ref 11.5–15.4)
HGB BLD-MCNC: 9.2 G/DL (ref 11.5–15.4)
MCH RBC QN AUTO: 30 PG (ref 26.8–34.3)
MCHC RBC AUTO-ENTMCNC: 31.9 G/DL (ref 31.4–37.4)
MCV RBC AUTO: 94 FL (ref 82–98)
PLATELET # BLD AUTO: 159 THOUSANDS/UL (ref 149–390)
PMV BLD AUTO: 9.9 FL (ref 8.9–12.7)
POTASSIUM SERPL-SCNC: 4.4 MMOL/L (ref 3.5–5.3)
RBC # BLD AUTO: 2.7 MILLION/UL (ref 3.81–5.12)
RH BLD: POSITIVE
SODIUM SERPL-SCNC: 140 MMOL/L (ref 136–145)
SPECIMEN EXPIRATION DATE: NORMAL
WBC # BLD AUTO: 7.37 THOUSAND/UL (ref 4.31–10.16)

## 2018-08-02 PROCEDURE — 86850 RBC ANTIBODY SCREEN: CPT | Performed by: NURSE PRACTITIONER

## 2018-08-02 PROCEDURE — 85014 HEMATOCRIT: CPT | Performed by: INTERNAL MEDICINE

## 2018-08-02 PROCEDURE — 99232 SBSQ HOSP IP/OBS MODERATE 35: CPT | Performed by: INTERNAL MEDICINE

## 2018-08-02 PROCEDURE — C9113 INJ PANTOPRAZOLE SODIUM, VIA: HCPCS | Performed by: EMERGENCY MEDICINE

## 2018-08-02 PROCEDURE — 85018 HEMOGLOBIN: CPT | Performed by: INTERNAL MEDICINE

## 2018-08-02 PROCEDURE — 87077 CULTURE AEROBIC IDENTIFY: CPT | Performed by: INTERNAL MEDICINE

## 2018-08-02 PROCEDURE — 0W3P8ZZ CONTROL BLEEDING IN GASTROINTESTINAL TRACT, VIA NATURAL OR ARTIFICIAL OPENING ENDOSCOPIC: ICD-10-PCS | Performed by: INTERNAL MEDICINE

## 2018-08-02 PROCEDURE — 85027 COMPLETE CBC AUTOMATED: CPT | Performed by: INTERNAL MEDICINE

## 2018-08-02 PROCEDURE — 0DB68ZX EXCISION OF STOMACH, VIA NATURAL OR ARTIFICIAL OPENING ENDOSCOPIC, DIAGNOSTIC: ICD-10-PCS | Performed by: INTERNAL MEDICINE

## 2018-08-02 PROCEDURE — 86901 BLOOD TYPING SEROLOGIC RH(D): CPT | Performed by: NURSE PRACTITIONER

## 2018-08-02 PROCEDURE — 86900 BLOOD TYPING SEROLOGIC ABO: CPT | Performed by: NURSE PRACTITIONER

## 2018-08-02 PROCEDURE — 80048 BASIC METABOLIC PNL TOTAL CA: CPT | Performed by: INTERNAL MEDICINE

## 2018-08-02 PROCEDURE — 3E0G8GC INTRODUCTION OF OTHER THERAPEUTIC SUBSTANCE INTO UPPER GI, VIA NATURAL OR ARTIFICIAL OPENING ENDOSCOPIC: ICD-10-PCS | Performed by: INTERNAL MEDICINE

## 2018-08-02 RX ORDER — EPINEPHRINE 1 MG/ML
INJECTION, SOLUTION, CONCENTRATE INTRAVENOUS AS NEEDED
Status: DISCONTINUED | OUTPATIENT
Start: 2018-08-02 | End: 2018-08-02 | Stop reason: HOSPADM

## 2018-08-02 RX ORDER — PROPOFOL 10 MG/ML
INJECTION, EMULSION INTRAVENOUS AS NEEDED
Status: DISCONTINUED | OUTPATIENT
Start: 2018-08-02 | End: 2018-08-02 | Stop reason: SURG

## 2018-08-02 RX ORDER — CLINDAMYCIN PHOSPHATE 150 MG/ML
INJECTION, SOLUTION INTRAVENOUS AS NEEDED
Status: DISCONTINUED | OUTPATIENT
Start: 2018-08-02 | End: 2018-08-02 | Stop reason: SURG

## 2018-08-02 RX ADMIN — PROPOFOL 20 MG: 10 INJECTION, EMULSION INTRAVENOUS at 08:59

## 2018-08-02 RX ADMIN — PROPOFOL 20 MG: 10 INJECTION, EMULSION INTRAVENOUS at 08:57

## 2018-08-02 RX ADMIN — PROPOFOL 30 MG: 10 INJECTION, EMULSION INTRAVENOUS at 08:52

## 2018-08-02 RX ADMIN — METOPROLOL TARTRATE 50 MG: 50 TABLET ORAL at 17:03

## 2018-08-02 RX ADMIN — SODIUM CHLORIDE 8 MG/HR: 9 INJECTION, SOLUTION INTRAVENOUS at 14:54

## 2018-08-02 RX ADMIN — PROPOFOL 20 MG: 10 INJECTION, EMULSION INTRAVENOUS at 09:01

## 2018-08-02 RX ADMIN — SODIUM CHLORIDE: 0.9 INJECTION, SOLUTION INTRAVENOUS at 08:49

## 2018-08-02 RX ADMIN — METOPROLOL TARTRATE 50 MG: 50 TABLET ORAL at 11:55

## 2018-08-02 RX ADMIN — PROPOFOL 30 MG: 10 INJECTION, EMULSION INTRAVENOUS at 08:54

## 2018-08-02 NOTE — ANESTHESIA PREPROCEDURE EVALUATION
Review of Systems/Medical History  Patient summary reviewed  Chart reviewed      Cardiovascular  Pacemaker/AICD,    Pulmonary       GI/Hepatic            Endo/Other     GYN    Breast cancer        Hematology   Musculoskeletal       Neurology   Psychology           Physical Exam    Airway    Mallampati score: II  TM Distance: >3 FB  Neck ROM: full     Dental   Comment: edentulous,     Cardiovascular  Rhythm: regular, Rate: normal, Cardiovascular exam normal    Pulmonary  Pulmonary exam normal Breath sounds clear to auscultation,     Other Findings        Anesthesia Plan  ASA Score- 3     Anesthesia Type- IV sedation with anesthesia with ASA Monitors  Additional Monitors:   Airway Plan:     Comment: Benefits and risks of planned anesthetic discussed with patient, and agrees to proceed  I, Dr Nayeli Stanley, the attending anesthesiologist, have personally seen and evaluated the patient prior to anesthetic care  I have reviewed the preanesthetic record, and other medical records if appropriate to the anesthetic care  If a CRNA is involved in the case, I have reviewed the CRNA assessment, if present, and agree  The patient is in a suitable condition to proceed with my formulated anesthetic plan        Plan Factors-    Induction- intravenous  Postoperative Plan-     Informed Consent- Anesthetic plan and risks discussed with patient

## 2018-08-02 NOTE — PROGRESS NOTES
Progress Note - Ant Strickland 80 y o  female MRN: [id number]    Unit/Bed#: [address] [place] [address] 204-01 Encounter: [id number]      Assessment:    Principal Problem:    Gastrointestinal hemorrhage with melena  Active Problems:    GERD without esophagitis    Hyperlipidemia    Hypertension    Malignant neoplasm of upper-outer quadrant of right breast in female, estrogen receptor positive ([address] )    Acute blood loss anemia  Resolved Problems:    * No resolved hospital problems  *      Plan:  Needs EGD-concerned about infil process in the stomach  Cont to monitor H and H    Subjective:   Had brown Bm this Am    Objective:     Vitals: Blood pressure 168/70, pulse 68, temperature (!) 97 2 °F (36 2 °C), temperature source Tympanic, resp  rate 18, height 5' (1 524 m), weight 47 7 kg (105 lb 2 6 oz), SpO2 95 %  ,Body mass index is 20 54 kg/m²  Intake/Output Summary (Last 24 hours) at 08/02/18 0746  Last data filed at 08/02/18 0700   Gross per 24 hour   Intake                0 ml   Output              550 ml   Net             -550 ml       Physical Exam:    Alert and awake in no acute distress  Lungs clear to auscultation bilaterally  Heart regular rate and rythm, murmers  Abdomen soft, active bowel sounds, tener epigastrium  Extremities: no cyanosis, clubbing or edema  Skin: warm, dry, no discrete lesions        Invasive Devices     Peripheral Intravenous Line            Peripheral IV 08/01/18 Left Antecubital less than 1 day                            Lab, Imaging and other studies: I have personally reviewed pertinent reports         Results from last 7 days  Lab Units 08/02/18  0502 08/02/18  0044 08/01/18  1824 08/01/18  0959   WBC Thousand/uL 7 37  --   --  7 27   HEMOGLOBIN g/dL 8 1* 8 0* 8 9* 10 2*   HEMATOCRIT % 25 4* 24 4* 27 1* 31 2*   PLATELETS Thousands/uL 159  --   --  211   NEUTROS PCT %  --   --   --  67   LYMPHS PCT %  --   --   --  22   MONOS PCT %  --   --   --  8   EOS PCT %  --   --   --  1       Results from last 7 days  Lab Units 08/02/18  0502 08/01/18  0959   SODIUM mmol/L 140 138   POTASSIUM mmol/L 4 4 4 6   CHLORIDE mmol/L 107 100   CO2 mmol/L 24 31   BUN mg/dL 28* 43*   CREATININE mg/dL 0 87 1 10   CALCIUM mg/dL 8 4 8 8   TOTAL PROTEIN g/dL  --  6 9   BILIRUBIN TOTAL mg/dL  --  0 40   ALK PHOS U/L  --  44*   ALT U/L  --  15   AST U/L  --  36   GLUCOSE RANDOM mg/dL 78 155*     Lab Results   Component Value Date    CKTOTAL 46 07/13/2015       Results from last 7 days  Lab Units 08/01/18  0959   INR  1 11     Lab Results   Component Value Date    URINECX 10,000-19,000 cfu/ml  02/10/2018       Imaging:  No results found for this or any previous visit  Results for orders placed during the hospital encounter of 02/03/17   XR chest 2 views    Narrative CHEST     INDICATION:  Evaluate for foreign body  COMPARISON:  1/23/2015 chest radiograph    VIEWS:  Frontal and lateral projections; 2 images    FINDINGS:  There is a left-sided pacer generator device with intact leads  Heart shadow appears unremarkable  Atherosclerotic vascular calcifications are noted  The lungs are clear  No pneumothorax or pleural effusion  Stable right apical scarring  Visualized osseous structures appear within normal limits for the patient's age  There are surgical clips in the right axilla  Impression No active pulmonary disease  Workstation performed: [id number]         PATIENT CENTERED ROUNDS: I have performed rounds with the nursing staff  This note has been constructed using a voice recognition system      Deshaun Cohi MD

## 2018-08-02 NOTE — SOCIAL WORK
Met with Pt  Pt's sister at bedside  Pt presents AA&Ox3  Discussed role of   Pt lives with dtr in [address]  Pt is independent with adls and ambulation  Pt uses cane for distance  Pt's dtr does driving and grocery shopping  Pt goes to [address] [place] [address] in [facility]  Spoke with Pt's dtr(Claudette: [phone]) per Pt's permission  Pt's dtr confirmed that they live in [place] and confirmed Pt's functional status  Pt's dtr informed that Pt was supposed to start chemo shots at the infusion center but came to the hospital  Pt's dtr informed Pt had [facility] years ago but able to recall which agency  Will follow for discharge planning

## 2018-08-02 NOTE — CONSULTS
Consultation - GI   Godfrey Chamberlain 80 y o  female MRN: [id number]  Unit/Bed#: [address] [facility] 204-01 Encounter: [id number]    Consults  ASSESSMENT  1  Upper gastrointestinal bleeding characterized by melena  Differential diagnosis include peptic ulcer disease  Infiltration from breast cancer in the differential diagnosis with abnormal CT scan  2  Anemia secondary to acute gastrointestinal blood loss  3  Remote history of peptic ulcer disease  4  Recurrent breast carcinoma    PLAN  1  EGD today consent has been obtained   2  Monitor hemoglobin and hematocrit  3  High-dose proton pump inhibitor therapy    Chief Complaint   Patient presents with    Black or Bloody Stool     pt presents to ED via EMS from home c/o of black loose clumpy stools  Bright red ring around the toilet of blood       HPI patient is a delightful 59-year-old white female with a recent diagnosis of recurrent breast cancer  Her 1st occurrence was over 10 years ago  The patient was in her usual state until yesterday when she noticed to very dark black bowel movements  She does report some epigastric abdominal discomfort and substernal discomfort which is relieved by Tums Mylanta  She does report having a remote history of peptic ulcer disease  She is on a baby aspirin does take occasional Aleve  She has not had vomiting or hematemesis  Initial hemoglobin was in the 10 g range    After hydration is down to 8 1 g this morning    Past Medical History:   Diagnosis Date    Breast cancer ([facility] [address] )     last assessed 11/19/17     Cardiac pacemaker     GERD (gastroesophageal reflux disease)     Hearing loss     Heart block     S/P pacemaker     Herpes zoster     last assessed 7/30/13    Hx of radiation therapy 2010    last assessed 11/19/17     Hyperlipidemia     Hypertension     Insomnia     last assessed 7/30/13, resolved 6/16/15    Lyme disease     last assessed 7/1/13    Malignant neoplasm of cervix ([facility] [address] )     last assessed 6/27/12, resolved 2/1/17     Osteoporosis     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 ([facility] [address] )     last assessed 6/27/12    Use of anastrozole (Arimidex)     took x 5yrs, last assessed 11/19/17        Past Surgical History:   Procedure Laterality Date    BREAST BIOPSY  10/05/2009    percutaneous needle core    BREAST SURGERY Right 11/13/2009    Lumpectomy     CARDIAC PACEMAKER PLACEMENT      dual chamber, last assessed 2/26/16    CATARACT EXTRACTION, BILATERAL      DENTAL SURGERY      ESOPHAGOGASTRODUODENOSCOPY N/A 2/3/2017    Procedure: ESOPHAGOGASTRODUODENOSCOPY (EGD): FOREIGN BODY REMOVAL;  Surgeon: Peggy Lai MD;  Location: [facility];  Service:     HERNIA REPAIR Right 01/28/2015    Inguinal, with mesh    HYSTERECTOMY      KNEE SURGERY      resolved 1999    LYMPHADENECTOMY  11/13/2009    Axillary     SENTINEL LYMPH NODE BIOPSY Right 11/13/2009       Facility-Administered Medications Prior to Admission   Medication    aluminum-magnesium hydroxide-simethicone (MYLANTA) 200-200-20 mg/5 mL oral suspension 30 mL    lidocaine viscous (XYLOCAINE) 2 % mucosal solution 15 mL     Prescriptions Prior to Admission   Medication    aspirin 81 mg chewable tablet    aspirin 81 MG tablet    calcium citrate-vitamin D (CITRACAL+D) 315-200 MG-UNIT per tablet    famotidine (PEPCID) 20 mg tablet    hydrOXYzine HCL (ATARAX) 25 mg tablet    LORazepam (ATIVAN) 0 5 mg tablet    metoprolol tartrate (LOPRESSOR) 50 mg tablet    Multiple Vitamins-Minerals (CENTRUM SILVER ADULT 50+ PO)    Multiple Vitamins-Minerals (CENTRUM SILVER ULTRA WOMENS) TABS    Omega-3 Fatty Acids (FISH OIL) 645 MG CAPS    pravastatin (PRAVACHOL) 20 mg tablet       Not on File    Social History  nondrinker nonsmoker lives with daughter    Family History   Problem Relation Age of Onset    Heart disease Mother     Heart disease Father        Review of Systems negative except  As noted in the HPI    BP (!) 184/73 (BP Location: Left arm)   Pulse 81   Temp 97 9 °F (36 6 °C) (Oral) Resp 18   Ht 5' (1 524 m)   Wt 50 6 kg (111 lb 8 8 oz)   SpO2 93%   BMI 21 79 kg/m²     PHYSICALEXAM  General-elderly white female she does appear younger than stated age  Eyes conjunctiva pallor  Mouth no lesions  Neck no JVP  Chest clear to a and P  Cor S1-S2  Abdomen soft nontender no masses  Extremities without clubbing cyanosis or edema  Skin no obvious rashes or lesions  Neuro alert oriented x3 no gross focal abnormality      Lab Results   Component Value Date    GLUCOSE 78 08/02/2018    CALCIUM 8 4 08/02/2018     08/02/2018    K 4 4 08/02/2018    CO2 24 08/02/2018     08/02/2018    BUN 28 (H) 08/02/2018    CREATININE 0 87 08/02/2018     Lab Results   Component Value Date    WBC 7 37 08/02/2018    HGB 8 1 (L) 08/02/2018    HCT 25 4 (L) 08/02/2018    MCV 94 08/02/2018     08/02/2018     Lab Results   Component Value Date    ALT 15 08/01/2018    AST 36 08/01/2018    ALKPHOS 44 (L) 08/01/2018    BILITOT 0 40 08/01/2018     No components found for: AMYLJKJJJASE  Lab Results   Component Value Date    LIPASE 350 08/01/2018     No results found for: IRON, TIBC, FERRITIN  Lab Results   Component Value Date    INR 1 11 08/01/2018

## 2018-08-02 NOTE — CASE MANAGEMENT
Initial Clinical Review    Admission: Date/Time/Statement: 8/1/18 @ 1246     Orders Placed This Encounter   Procedures    Inpatient Admission (expected length of stay for this patient is greater than two midnights)     Standing Status:   Standing     Number of Occurrences:   1     Order Specific Question:   Admitting Physician     Answer:   Viktoriya Kearns [[id number]]     Order Specific Question:   Level of Care     Answer:   Med Surg [[id number]]     Order Specific Question:   Estimated length of stay     Answer:   More than 2 Midnights     Order Specific Question:   Certification     Answer:   I certify that inpatient services are medically necessary for this patient for a duration of greater than two midnights  See H&P and MD Progress Notes for additional information about the patient's course of treatment  ED: Date/Time/Mode of Arrival:   ED Arrival Information     Expected Arrival Acuity Means of Arrival Escorted By Service Admission Type    - 8/1/2018 09:36 Urgent Ambulance [facility]) General Medicine Urgent    Arrival Complaint    bloody stool          Chief Complaint:   Chief Complaint   Patient presents with    Black or Bloody Stool     pt presents to ED via EMS from home c/o of black loose clumpy stools  Bright red ring around the toilet of blood       History of Illness: 80y o  year old female who presents with melena which started today  Patient has has remote history of peptic ulcer disease and history of GERD  Patient noticed some abdominal pain last day or so and today had 2-3 melanotic bowel movements  The patient is on a daily aspirin daily  Of note the patient was supposed to start chemotherapy today for recurrence metastatic breast carcinoma    She had breast carcinoma in 2009 as developed a recent recurrence in lymph nodes in her neck      ED Vital Signs:   ED Triage Vitals   Temperature Pulse Respirations Blood Pressure SpO2   08/01/18 0940 08/01/18 0940 08/01/18 0940 08/01/18 0940 08/01/18 0940   97 5 °F (36 4 °C) 76 17 130/63 95 %      Temp Source Heart Rate Source Patient Position - Orthostatic VS BP Location FiO2 (%)   08/01/18 0940 08/01/18 1115 08/01/18 0940 08/01/18 0940 --   Temporal Monitor Lying Right arm       Pain Score       08/01/18 0940       8        Wt Readings from Last 1 Encounters:   08/02/18 50 6 kg (111 lb 8 8 oz)       Vital Signs (abnormal): maximum /79  Exam cachectic  Left chest pacemaker in place  Abdominal bruit  Abdominal tenderness  External hemorrhoid  Guaiac positive stool  Right axilla and upper outer breast with firm immobile mass  Abnormal Labs/Diagnostic Test Results: 'bun 37  Creatinine 1 10  Glucose 155  Alkaline phosphatase 44  Albumin 3  Wbc 7 27, hgb 10 2, hct 31 2  Stool hemoccult positive  Ct abdomen - Diffuse thickening of the wall the stomach suggestive of gastritis or infiltrative disease in the stomach, similar from July 25, 2018  Colonic diverticulosis without evidence of acute diverticulitis  Marked fluid-filled distention of the gallbladder without noncontrast CT evidence of calcified stones, gallbladder wall thickening, or pericholecystic inflammatory edema   This is unchanged from July 25, 2018     1824-  hgb 8 9, hct 27 1  0044 hgb 8, hct 24 4      8/2/2018-  hgb 8 1, hct 25 4  Bun 28  EGD- A hiatus hernia found  An ulcer found in the fundus  Epinephrine injection therapy was applied to control bleeding  Heat probe   was used to control bleeding  Possible gastric angioectasia/AVM found  Heat probe was used to control bleeding  Moderately severe chronic   gastritis found in the antrum  Two biopsies taken  Normal duodenal bulb,   2nd portion of the duodenum, and 3rd portion of the duodenum      ED Treatment:   Medication Administration from 08/01/2018 0936 to 08/01/2018 1343       Date/Time Order Dose Route Action Comments     08/01/2018 1226 sodium chloride 0 9 % bolus 1,000 mL 0 mL Intravenous Stopped 08/01/2018 1103 sodium chloride 0 9 % bolus 1,000 mL 1,000 mL Intravenous New Bag      08/01/2018 1103 pantoprazole (PROTONIX) injection 40 mg 40 mg Intravenous Given      08/01/2018 1300 pantoprazole (PROTONIX) injection 40 mg 40 mg Intravenous Given           Past Medical/Surgical History:    Active Ambulatory Problems     Diagnosis Date Noted    Adjustment disorder with anxiety 06/06/2017    Complete heart block (HCC) 07/24/2017    GERD without esophagitis 01/23/2015    Hyperglycemia 05/03/2017    Hyperlipidemia 06/27/2012    Hypertension 06/27/2012    Impingement syndrome of left shoulder 03/29/2016    Lymphedema 12/15/2016    Malignant neoplasm of upper-outer quadrant of right breast in female, estrogen receptor positive ([facility] [address] ) 08/31/2017    Onychomycosis 05/03/2017    Osteoporosis 06/27/2012    Pruritus 07/13/2017    Tinea pedis of both feet 09/05/2017    Urinary urgency 10/31/2016    Esophageal stenosis 05/08/2018    Tick bite 06/26/2018    Skin lesion of neck 06/26/2018    Nausea 06/26/2018    Cancer, metastatic to skin ([facility] [address] ) 07/19/2018     Resolved Ambulatory Problems     Diagnosis Date Noted    Hx of radiation therapy     Use of anastrozole (Arimidex)      Past Medical History:   Diagnosis Date    Breast cancer ([facility] [address] [facility]     Cardiac pacemaker     GERD (gastroesophageal reflux disease)     Hearing loss     Heart block     Herpes zoster     Hx of radiation therapy 2010    Hyperlipidemia     Hypertension     Insomnia     Lyme disease     Malignant neoplasm of cervix ([facility] [address] [facility]     Osteoporosis     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 (HCC)     Use of anastrozole (Arimidex)        Admitting Diagnosis: Gastritis [K29 70]  Bloody stool [K92 1]  GI bleed [K92 2]    Age/Sex: 80 y o  female    Assessment/Plan: Gastrointestinal hemorrhage with melena  Active Problems:    GERD without esophagitis    Hyperlipidemia    Hypertension    Malignant neoplasm of upper-outer quadrant of right breast in female, estrogen receptor positive ([facility] [address] )    Acute blood loss anemia     Plan:  Admit step-down unit Protonix drip  GI consultation possible EGD  Of note she did have an abnormality noted on CT scan possibly consistent with infiltrative process  Check H&H q 6 hours  Will keep NPO for now  Transfusion consent form was obtained  She requests to be a level 3 DNR  I attempted to call the daughter left a message on her answering machine  Will  require greater than 2 midnight stay      Admission Orders:  8//1/2018  1247 INPATIENT   Scheduled Meds:   Current Facility-Administered Medications:  iodixanol 100 mL Intravenous Once in imaging    metoprolol tartrate 50 mg Oral BID    ondansetron 4 mg Intravenous Once    pantoprozole (PROTONIX) infusion (Continuous) 8 mg/hr Intravenous Continuous Last Rate: 8 mg/hr (08/01/18 1436)   sodium chloride 50 mL/hr Intravenous Continuous Last Rate: 50 mL/hr (08/02/18 0839)         Continuous Infusions:   pantoprozole (PROTONIX) infusion (Continuous) 8 mg/hr Last Rate: 8 mg/hr (08/01/18 1436)   sodium chloride 50 mL/hr Last Rate: 50 mL/hr (08/02/18 0839)     PRN Meds: none    Telemetry  scds  Clears  hgb and hct q 6h  Consult GI

## 2018-08-02 NOTE — INTERIM OP NOTE
ESOPHAGOGASTRODUODENOSCOPY (EGD)  Postoperative Note  PATIENT NAME: Greyson Smith  : 8/10/1924  MRN: [id number]  [facility] GI ROOM 01    Surgery Date: 2018    Preop Diagnosis:  GI bleed [K92 2]    Post-Op Diagnosis Codes:     * GI bleed [K92 2]     Small - contact bleeding  - lesser curve - fundus - treated with bipolar and epi     AVM - antrum- treated with bipolar     Hiatal hernia     Antral gastritis - shagufta test taken       Procedure(s) (LRB):  ESOPHAGOGASTRODUODENOSCOPY (EGD) (N/A)    Surgeon(s) and Role:     Darby Charles MD - Primary    Specimens:  * No specimens in log *    Estimated Blood Loss:   Minimal    Anesthesia Type:   Conscious Sedation      Findings:    see above -plan  Check shagufta, clear liquids - no nsaid or asa, repeat ege  Complications:   None    SIGNATURE: Reji Silva MD   DATE: 2018   TIME: 9:43 AM

## 2018-08-02 NOTE — OP NOTE
**** GI/ENDOSCOPY REPORT ****     PATIENT NAME: JUAN LUIS JEREZ - VISIT ID:  Patient ID: [id number] YOB: 1924     INTRODUCTION: Esophagogastroduodenoscopy - A 80 female patient presents   for an inpatient Esophagogastroduodenoscopy at [facility]  INDICATIONS: Recent bleeding  Recent melena  CONSENT: The benefits, risks, and alternatives to the procedure were   discussed and informed consent was obtained from the patient  PREPARATION:  EKG, pulse, pulse oximetry and blood pressure were monitored   throughout the procedure  MEDICATIONS:     PROCEDURE:  The endoscope was passed without difficulty through the mouth   under direct visualization and advanced to the 2nd portion of the   duodenum  The scope was withdrawn and the mucosa was carefully examined  FINDINGS:   Esophagus: There was a 2 cm and small hiatus hernia visible in   the esophagus  Stomach: There was a single small, superficial ulcer,   which measured 5 mm in size, in the fundus  It bled on contact  To   control bleeding, 3 injections of epinephrine (1:10,000) was administered,   using 0 5 cc's per injection; the total dosage was 1 5 cc's  To control   bleeding, a gold probe heat probe set at 15 Joules was employed with   success  There was a single small area of possible angioectasia/AVM in the   stomach  It bled on contact  To control bleeding, a gold probe heat probe   set at 15 Joules was employed with success  A patchy area of moderately   severe chronic gastritis was found in the antrum  Two forceps biopsies   was taken  The specimens were collected for shagufta test   Duodenum: The   duodenal bulb, 2nd portion of the duodenum, and 3rd portion of the   duodenum appeared to be normal      COMPLICATIONS: There were no complications  IMPRESSIONS: A hiatus hernia found  An ulcer found in the fundus  Epinephrine injection therapy was applied to control bleeding   Heat probe   was used to control bleeding  Possible gastric angioectasia/AVM found  Heat probe was used to control bleeding  Moderately severe chronic   gastritis found in the antrum  Two biopsies taken  Normal duodenal bulb,   2nd portion of the duodenum, and 3rd portion of the duodenum  RECOMMENDATIONS: Avoid all non-steroidal anti-inflammatory drugs (NSAID's)   including but not limited to Ibuprofen, Advil, Motrin, and Nuprin  Start   clear-liquid diet  Watch for further bleeding  No aspirin  EGD recommended   in 6 to 8 weeks  Patient will be sent a reminder letter  ESTIMATED BLOOD LOSS: Insignificant  PATHOLOGY SPECIMENS: Two forceps biopsies taken for shagufta test  Associated   finding: Gastritis  PROCEDURE CODES: 54046 - EGD flexible; with control of bleeding 61976 -   EGD flexible; with biopsy     ICD-9 Codes: 459 0 Hemorrhage, unspecified 578 1 Blood in stool 553 3   Diaphragmatic hernia without mention of obstruction or gangrene 747 61   Gastrointestinal vessel anomaly 535 10 Atrophic gastritis, without mention   of hemorrhage     ICD-10 Codes: K92 2 Gastrointestinal hemorrhage, unspecified K92 1 Melena   K44 Diaphragmatic hernia K25 Gastric ulcer K31 811 Angiodysplasia of   stomach and duodenum with bleeding     PERFORMED BY: FLORINDA Dobson  on 08/02/2018  The procedure was   performed by Dr Robb Nguyen  Version 1, electronically signed by FLORINDA Farah  on 08/02/2018   at 10:08

## 2018-08-02 NOTE — ANESTHESIA POSTPROCEDURE EVALUATION
Post-Op Assessment Note      CV Status:  Stable    Mental Status:  Alert    Hydration Status:  Stable    PONV Controlled:  None    Airway Patency:  Patent    Post Op Vitals Reviewed: Yes          Staff: CRNA       Comments: awake and alert           BP     Temp      Pulse     Resp      SpO2

## 2018-08-03 PROBLEM — K25.4 GASTRIC ULCER WITH HEMORRHAGE: Status: ACTIVE | Noted: 2018-08-03

## 2018-08-03 LAB
ABO GROUP BLD: NORMAL
BLD GP AB SCN SERPL QL: NEGATIVE
HCT VFR BLD AUTO: 24.9 % (ref 34.8–46.1)
HCT VFR BLD AUTO: 24.9 % (ref 34.8–46.1)
HCT VFR BLD AUTO: 25.7 % (ref 34.8–46.1)
HGB BLD-MCNC: 8 G/DL (ref 11.5–15.4)
HGB BLD-MCNC: 8.2 G/DL (ref 11.5–15.4)
HGB BLD-MCNC: 8.3 G/DL (ref 11.5–15.4)
RH BLD: POSITIVE
SPECIMEN EXPIRATION DATE: NORMAL
UREASE TISS QL: NEGATIVE

## 2018-08-03 PROCEDURE — 99232 SBSQ HOSP IP/OBS MODERATE 35: CPT | Performed by: INTERNAL MEDICINE

## 2018-08-03 PROCEDURE — G8978 MOBILITY CURRENT STATUS: HCPCS

## 2018-08-03 PROCEDURE — 85014 HEMATOCRIT: CPT | Performed by: INTERNAL MEDICINE

## 2018-08-03 PROCEDURE — 85018 HEMOGLOBIN: CPT | Performed by: INTERNAL MEDICINE

## 2018-08-03 PROCEDURE — C9113 INJ PANTOPRAZOLE SODIUM, VIA: HCPCS | Performed by: EMERGENCY MEDICINE

## 2018-08-03 PROCEDURE — 97163 PT EVAL HIGH COMPLEX 45 MIN: CPT

## 2018-08-03 PROCEDURE — G8979 MOBILITY GOAL STATUS: HCPCS

## 2018-08-03 PROCEDURE — C9113 INJ PANTOPRAZOLE SODIUM, VIA: HCPCS | Performed by: INTERNAL MEDICINE

## 2018-08-03 RX ORDER — HYDRALAZINE HYDROCHLORIDE 20 MG/ML
10 INJECTION INTRAMUSCULAR; INTRAVENOUS EVERY 6 HOURS PRN
Status: DISCONTINUED | OUTPATIENT
Start: 2018-08-03 | End: 2018-08-08 | Stop reason: HOSPADM

## 2018-08-03 RX ORDER — PANTOPRAZOLE SODIUM 40 MG/1
40 INJECTION, POWDER, FOR SOLUTION INTRAVENOUS EVERY 12 HOURS SCHEDULED
Status: DISCONTINUED | OUTPATIENT
Start: 2018-08-03 | End: 2018-08-04

## 2018-08-03 RX ADMIN — METOPROLOL TARTRATE 50 MG: 50 TABLET ORAL at 17:32

## 2018-08-03 RX ADMIN — METOPROLOL TARTRATE 50 MG: 50 TABLET ORAL at 08:36

## 2018-08-03 RX ADMIN — HYDRALAZINE HYDROCHLORIDE 10 MG: 20 INJECTION INTRAMUSCULAR; INTRAVENOUS at 01:28

## 2018-08-03 RX ADMIN — SODIUM CHLORIDE 50 ML/HR: 0.9 INJECTION, SOLUTION INTRAVENOUS at 02:51

## 2018-08-03 RX ADMIN — SODIUM CHLORIDE 8 MG/HR: 9 INJECTION, SOLUTION INTRAVENOUS at 02:49

## 2018-08-03 RX ADMIN — PANTOPRAZOLE SODIUM 40 MG: 40 INJECTION, POWDER, FOR SOLUTION INTRAVENOUS at 21:56

## 2018-08-03 NOTE — PROGRESS NOTES
Progress Note - Randy Knowles 8/10/1924, 80 y o  female MRN: [id number]    Unit/Bed#: [address] [place] [address] Encounter: [id number]    Primary Care Provider: Stephanie Mustafa MD   Date and time admitted to hospital: 2018  9:36 AM        * Gastric ulcer with hemorrhage   Assessment & Plan    She status post EGD  Will continue IV Protonix 40 mg b i d  Acute blood loss anemia   Assessment & Plan    Patient still has bloody and melanotic stools probably still remnants from the previous GI bleed from gastric ulcers  Will continue to monitor hemoglobin q 6 hours    Patient has decreased p o  intake will continue normal saline solution at 50 cc an hour  Will try to get patient out of bed, will ask Physical Occupational therapy to see the patient        Essential hypertension   Assessment & Plan    Stable on Lopressor            VTE Prophylaxis: in place    Patient Centered Rounds: I rounded with patient's nurse    Current Length of Stay: 2 day(s)    Current Patient Status: Inpatient    Certification Statement: Pt requires additional inpatient hospital stay due to: see assessment and plan        Subjective:   Patient complains of gurgling sounds coming from her abdomen  She had bloody amniotic stools this morning  Denies any nausea  Has decreased p o  intake and appetite  Denies chest pain, shortness of breath, dysuria    All other ROS are negative    Objective:     Vitals:   Temp (24hrs), Av 1 °F (36 7 °C), Min:97 4 °F (36 3 °C), Max:98 5 °F (36 9 °C)    HR:  [69-84] 81  Resp:  [18-20] 18  BP: (145-204)/(51-81) 163/68  SpO2:  [92 %-98 %] 94 %  Body mass index is 21 96 kg/m²  Input and Output Summary (last 24 hours): Intake/Output Summary (Last 24 hours) at 18 1118  Last data filed at 18 0000   Gross per 24 hour   Intake           250 83 ml   Output              850 ml   Net          -599 17 ml       Physical Exam:     Physical Exam   Constitutional: She appears well-developed   No distress  HENT:   Head: Normocephalic  Mouth/Throat: Oropharynx is clear and moist    Eyes: Conjunctivae are normal    Neck: Neck supple  Cardiovascular: Normal rate and regular rhythm  Pulmonary/Chest: Effort normal  No respiratory distress  She has no wheezes  She has no rales  Abdominal: Soft  Bowel sounds are normal  She exhibits no distension  There is tenderness (There is mild epigastric tenderness)  Lymphadenopathy:     She has no cervical adenopathy  Neurological: She is alert  No cranial nerve deficit  Skin: Skin is warm and dry  No rash noted  Psychiatric: She has a normal mood and affect  Vitals reviewed  I personally reviewed labs and imaging reports for today  Last 24 Hours Medication List:     Current Facility-Administered Medications:  hydrALAZINE 10 mg Intravenous Q6H PRN JAZMIN Townsend    iodixanol 100 mL Intravenous Once in imaging Maria Fernanda Putnam DO    metoprolol tartrate 50 mg Oral BID Jenniffer Villareal MD    ondansetron 4 mg Intravenous Once Jenniffer Villareal MD    pantoprazole 40 mg Intravenous Q12H [address] [address], MD    sodium chloride 50 mL/hr Intravenous Continuous Jenniffer Villareal MD Last Rate: 50 mL/hr (08/03/18 0251)     Facility-Administered Medications Ordered in Other Encounters:  alteplase 2 mg Intracatheter PRN Yazmin Mcgowan MD   fulvestrant 250 mg Intramuscular Once Yazmin Mcgowan MD   And       fulvestrant 250 mg Intramuscular Once Yazmin Mcgowan MD   heparin lock flush 300 Units Jared Jeanette PRN Yazmin Mcgowan MD          Today, Patient Was Seen By: Hiral Self MD    ** Please Note: Dictation voice to text software may have been used in the creation of this document   **

## 2018-08-03 NOTE — PLAN OF CARE
Problem: PHYSICAL THERAPY ADULT  Goal: Performs mobility at highest level of function for planned discharge setting  See evaluation for individualized goals  Treatment/Interventions: Functional transfer training, LE strengthening/ROM, Elevations, Therapeutic exercise, Endurance training, Equipment eval/education, Gait training, Spoke to nursing  Equipment Recommended: Zeb Vaughn       See flowsheet documentation for full assessment, interventions and recommendations  Prognosis: Good  Problem List: Decreased strength, Decreased endurance, Impaired balance, Decreased mobility  Assessment: Pt is 80 y o  female admitted with hx of melena and Dx of Gastric ulcer with hemorrhage; acute blood loss anemia; pt underwent EGD on 8/2/2018  Pt 's comorbidities affecting POC include: breast CA, PPM, hearing loss, HTN, and OP and personal factors of: advanced age and step in the house  Pt's clinical presentation is currently unstable/unpredictable which is evident in abn lab values being monitored/trending and need for stand by assist w/ all phases of mobility incl amb w/ rw when usually mobilizing independently  Pt presents w/ generalized weakness, incl overall decreased LE strength, decreased functional endurance w/ c/o fatigue post amb trial, min unsupported balance impairment requiring use of rw at this time, and gait deviations (likely due to above) w/ close guarding required to assure safety during amb  Will cont to follow pt in PT for progressive mobilization to address above functional deficits and to max level of (I), endurance, and safety  Otherwise, anticipate pt will return home w/ available family support upon D/C provided she cont improving w/ mobility skills, safety, and endurance and when medically cleared; home PT follow up may need to be considered at this time; rw for amb; will follow  Recommendation: Home PT, Home with family support          See flowsheet documentation for full assessment

## 2018-08-03 NOTE — PLAN OF CARE
DISCHARGE PLANNING     Discharge to home or other facility with appropriate resources Progressing        DISCHARGE PLANNING - CARE MANAGEMENT     Discharge to post-acute care or home with appropriate resources Progressing        INFECTION - ADULT     Absence or prevention of progression during hospitalization Progressing     Absence of fever/infection during neutropenic period Progressing        Knowledge Deficit     Patient/family/caregiver demonstrates understanding of disease process, treatment plan, medications, and discharge instructions Progressing        Potential for Falls     Patient will remain free of falls Progressing        Prexisting or High Potential for Compromised Skin Integrity     Skin integrity is maintained or improved Progressing        SAFETY ADULT     Maintain or return to baseline ADL function Progressing     Maintain or return mobility status to optimal level Progressing

## 2018-08-03 NOTE — ASSESSMENT & PLAN NOTE
Patient still has bloody and melanotic stools probably still remnants from the previous GI bleed from gastric ulcers  Will continue to monitor hemoglobin q 6 hours    Patient has decreased p o  intake will continue normal saline solution at 50 cc an hour    Will try to get patient out of bed, will ask Physical Occupational therapy to see the patient

## 2018-08-03 NOTE — PHYSICAL THERAPY NOTE
Physical Therapy Evaluation     Patient's Name: Ulyses Hatchet    Admitting Diagnosis  Gastritis [K29 70]  Bloody stool [K92 1]  GI bleed [K92 2]    Problem List  Patient Active Problem List   Diagnosis    Adjustment disorder with anxiety    [facility] block [facility]    GERD without esophagitis    Hyperglycemia    Hyperlipidemia    Essential hypertension    Impingement syndrome of left shoulder    Lymphedema    Malignant neoplasm of upper-outer quadrant of right breast in female, estrogen receptor positive ([facility] )    Onychomycosis    Osteoporosis    Pruritus    Tinea pedis of both feet    Urinary urgency    Esophageal stenosis    Tick bite    Skin lesion of neck    Nausea    Cancer, metastatic to skin ([facility] )    Gastrointestinal hemorrhage with melena    Acute blood loss anemia    GI bleed    Gastric ulcer with hemorrhage       Past Medical History  Past Medical History:   Diagnosis Date    [facility]     last assessed 11/19/17     Cardiac pacemaker     GERD (gastroesophageal reflux disease)     Hearing loss     Heart block     S/P pacemaker     Herpes zoster     last assessed 7/30/13    Hx of radiation therapy 2010    last assessed 11/19/17     Hyperlipidemia     Hypertension     Insomnia     last assessed 7/30/13, resolved 6/16/15    Lyme disease     last assessed 7/1/13    Malignant neoplasm of cervix ([facility] )     last assessed 6/27/12, resolved 2/1/17     Osteoporosis     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 ([facility] )     last assessed 6/27/12    Use of anastrozole (Arimidex)     took x 5yrs, last assessed 11/19/17        Past Surgical History  Past Surgical History:   Procedure Laterality Date    BREAST BIOPSY  10/05/2009    percutaneous needle core    BREAST SURGERY Right 11/13/2009    Lumpectomy     CARDIAC PACEMAKER PLACEMENT      dual chamber, last assessed 2/26/16    CATARACT EXTRACTION, BILATERAL      DENTAL SURGERY      ESOPHAGOGASTRODUODENOSCOPY N/A 2/3/2017    Procedure: ESOPHAGOGASTRODUODENOSCOPY (EGD): FOREIGN BODY REMOVAL;  Surgeon: Jt Davis MD;  Location: [facility];  Service:     ESOPHAGOGASTRODUODENOSCOPY N/A 8/2/2018    Procedure: ESOPHAGOGASTRODUODENOSCOPY (EGD);   Surgeon: Jt Davis MD;  Location: [facility];  Service: Gastroenterology    HERNIA REPAIR Right 01/28/2015    Inguinal, with mesh    HYSTERECTOMY      KNEE SURGERY      resolved 1999    LYMPHADENECTOMY  11/13/2009    Axillary     SENTINEL LYMPH NODE BIOPSY Right 11/13/2009 08/03/18 8811   Note Type   Note type Eval only   Pain Assessment   Pain Assessment No/denies pain   Home Living   Type of [address] One level  (1 small step inside the house; no JADE)   Prior Function   Level of [place] Independent with ADLs and functional mobility  (amb w/ SPC for distances)   Lives With Daughter   Receives Help From Family   Restrictions/Precautions   Braces or Orthoses (denies)   Other Precautions Fall Risk;Multiple lines   General   Additional Pertinent History cleared for assessment (spoke to ns)   Cognition   Overall Cognitive Status [facility]   Arousal/Participation Alert   Orientation Level Oriented to person;Oriented to place;Oriented to situation   Memory Within functional limits   Following Commands Follows one step commands without difficulty   Comments Pt is observed in bed; reports she has not walked yet; agreeable to try mobilization;   RUE Assessment   RUE Assessment WFL  (AROM)   LUE Assessment   LUE Assessment WFL  (AROM)   RLE Assessment   RLE Assessment WFL  (AROM)   Strength RLE   RLE Overall Strength (fair +/ good - (grossly))   LLE Assessment   LLE Assessment WFL  (AROM)   Strength LLE   LLE Overall Strength (fair +/ good - (grossly))   Bed Mobility   Supine to Sit 6  Modified independent   Transfers   Sit to Stand 5  Supervision  (2nd trial; min (A) 1st trial)   Additional items Assist x 1;Verbal cues  (reviewed hands placement w/ rw)   Stand to Sit 5 Supervision  (2nd trial; min (A) 1st trial)   Additional items Assist x 1;Verbal cues  (reviewed hands placement w/ rw)   Stand pivot 4  Minimal assistance   Additional items Assist x 1;Verbal cues; Increased time required  (bed to chair to the (R) side prior to amb)   Additional Comments (B) SCDs placed back on and activated; call and phone w/in reach   Ambulation/Elevation   Gait pattern Excessively slow; Inconsistent bernard   Gait Assistance 4  Minimal assist   Additional items Assist x 1;Verbal cues; Tactile cues   Assistive Device Rolling walker   Distance 100 ft   Balance   Static Sitting Fair +   Static Standing Fair -  (supported)   Ambulatory Poor +  (w/ rw)   Activity Tolerance   Activity Tolerance Patient limited by fatigue   Nurse Made Aware spoke to [address]   Assessment   Prognosis Good   Problem List Decreased strength;Decreased endurance; Impaired balance;Decreased mobility   Assessment Pt is 80 y o  female admitted with hx of melena and Dx of Gastric ulcer with hemorrhage; acute blood loss anemia; pt underwent EGD on 8/2/2018  Pt 's comorbidities affecting POC include: breast CA, PPM, hearing loss, HTN, and OP and personal factors of: advanced age and step in the house  Pt's clinical presentation is currently unstable/unpredictable which is evident in abn lab values being monitored/trending and need for stand by assist w/ all phases of mobility incl amb w/ rw when usually mobilizing independently  Pt presents w/ generalized weakness, incl overall decreased LE strength, decreased functional endurance w/ c/o fatigue post amb trial, min unsupported balance impairment requiring use of rw at this time, and gait deviations (likely due to above) w/ close guarding required to assure safety during amb  Will cont to follow pt in PT for progressive mobilization to address above functional deficits and to max level of (I), endurance, and safety   Otherwise, anticipate pt will return home w/ available family support upon D/C provided she cont improving w/ mobility skills, safety, and endurance and when medically cleared; home PT follow up may need to be considered at this time; rw for amb; will follow  Goals   Patient Goals to go home   STG Expiration Date 08/13/18   Short Term Goal #1 7-10 days  Pt will amb 300 ft w/ least restrictive assistive device PRN, mod (I) in order to facilitate safe return to premorbid environment and community amb status  Pt will negotiate 1 step w/ appropriate AD, (S)x1 in order to navigate between levels of home environment safely  Pt will perform transfers w/ mod (I) to assure (I) and safety w/ functional mobility/transitions w/ all aspects of mobility/locomotion  Pt will participate in LE therex and balance activities to max progression w/ mobility skills  Treatment Day 0   Plan   Treatment/Interventions Functional transfer training;LE strengthening/ROM; Elevations; Therapeutic exercise; Endurance training;Equipment eval/education;Gait training;Spoke to nursing   PT Frequency 5x/wk   Recommendation   Recommendation Home PT; Home with family support   Equipment Recommended Walker   Modified Betsy Scale   Modified Betsy Scale 4   Barthel Index   Feeding 10   Bathing 0   Grooming Score 5   Dressing Score 5   Bladder Score 10   Bowels Score 10   Toilet Use Score 5   Transfers (Bed/Chair) Score 10   Mobility (Level Surface) Score 0   Stairs Score 0   Barthel Index Score 55       Radha Russell, PT

## 2018-08-03 NOTE — PROGRESS NOTES
Ellen Ulloa  [id number]    65 y o   female      ASSESSMENT  1  Upper gastrointestinal bleeding secondary to gastric ulcer that bled on contact  Status post epinephrine and BICAP cauterization  Possible AVM in the stomach that also bled on contact status post BiPAP with control bleeding  Hiatal hernia also noted  Likely NSAID induced  Exclude H pylori  One episode of melena this morning, suspect old blood  2   Anemia secondary to acute gastrointestinal blood loss - hemoglobin holding a g range  3   Remote history of peptic ulcer disease  4  Recurrent breast carcinoma       PLAN  1  Follow H&H  2   D/C continues IV Protonix drip after bag has been completed and change to Protonix IV twice daily  3  Advance to full liquid diet  4  Await MATILDE test  5  Repeat EGD in 6-8 weeks  Chief Complaint   Patient presents with    Black or Bloody Stool     pt presents to ED via EMS from home c/o of black loose clumpy stools  Bright red ring around the toilet of blood       SUBJECTIVE/HPI   One episode of melena this morning  Denies any abdominal pain, nausea vomiting  On clears  /68 (BP Location: Left arm)   Pulse 81   Temp 98 °F (36 7 °C) (Oral)   Resp 18   Ht 5' (1 524 m)   Wt 51 kg (112 lb 7 oz)   SpO2 94%   BMI 21 96 kg/m²       PHYSICALEXAM  Constitutional:  Well developed, no acute distress, non-toxic appearance    Appears younger than stated age   Eyes:  conjunctiva pale   HENT:  Atraumatic  Respiratory:  No respiratory distress   Cardiovascular:  Normal rate  GI:  Soft, nondistended, normal bowel sounds, nontender  Musculoskeletal:  No edema  Neurologic:  Alert & oriented x 3      Lab Results   Component Value Date    GLUCOSE 78 08/02/2018    CALCIUM 8 4 08/02/2018     08/02/2018    K 4 4 08/02/2018    CO2 24 08/02/2018     08/02/2018    BUN 28 (H) 08/02/2018    CREATININE 0 87 08/02/2018     Lab Results   Component Value Date    WBC 7 37 08/02/2018    HGB 8 0 (L) 08/03/2018    HCT 24 9 (L) 08/03/2018    MCV 94 08/02/2018     08/02/2018     Lab Results   Component Value Date    ALT 15 08/01/2018    AST 36 08/01/2018    ALKPHOS 44 (L) 08/01/2018    BILITOT 0 40 08/01/2018     No results found for: AMYLASE  Lab Results   Component Value Date    LIPASE 350 08/01/2018     No results found for: IRON, TIBC, FERRITIN  Lab Results   Component Value Date    INR 1 11 08/01/2018       Counseling / Coordination of Care  Total floor / unit time spent today 25 minutes  Greater than 50% of total time was spent with the patient and / or family counseling and / or coordination of care  A description of the counseling / coordination of care: Agus Casper

## 2018-08-04 PROBLEM — K92.2 GI BLEED: Status: RESOLVED | Noted: 2018-08-01 | Resolved: 2018-08-04

## 2018-08-04 LAB
HCT VFR BLD AUTO: 26.5 % (ref 34.8–46.1)
HCT VFR BLD AUTO: 28.6 % (ref 34.8–46.1)
HGB BLD-MCNC: 8.5 G/DL (ref 11.5–15.4)
HGB BLD-MCNC: 9.5 G/DL (ref 11.5–15.4)

## 2018-08-04 PROCEDURE — 85018 HEMOGLOBIN: CPT | Performed by: INTERNAL MEDICINE

## 2018-08-04 PROCEDURE — 85014 HEMATOCRIT: CPT | Performed by: INTERNAL MEDICINE

## 2018-08-04 PROCEDURE — 99232 SBSQ HOSP IP/OBS MODERATE 35: CPT | Performed by: INTERNAL MEDICINE

## 2018-08-04 RX ORDER — PRAVASTATIN SODIUM 20 MG
20 TABLET ORAL
Status: DISCONTINUED | OUTPATIENT
Start: 2018-08-04 | End: 2018-08-08 | Stop reason: HOSPADM

## 2018-08-04 RX ORDER — PANTOPRAZOLE SODIUM 40 MG/1
40 TABLET, DELAYED RELEASE ORAL
Status: DISCONTINUED | OUTPATIENT
Start: 2018-08-04 | End: 2018-08-08 | Stop reason: HOSPADM

## 2018-08-04 RX ADMIN — HYDRALAZINE HYDROCHLORIDE 10 MG: 20 INJECTION INTRAMUSCULAR; INTRAVENOUS at 02:33

## 2018-08-04 RX ADMIN — SODIUM CHLORIDE 50 ML/HR: 0.9 INJECTION, SOLUTION INTRAVENOUS at 01:53

## 2018-08-04 RX ADMIN — PANTOPRAZOLE SODIUM 40 MG: 40 TABLET, DELAYED RELEASE ORAL at 18:29

## 2018-08-04 RX ADMIN — PANTOPRAZOLE SODIUM 40 MG: 40 TABLET, DELAYED RELEASE ORAL at 09:10

## 2018-08-04 RX ADMIN — METOPROLOL TARTRATE 50 MG: 50 TABLET ORAL at 09:10

## 2018-08-04 RX ADMIN — METOPROLOL TARTRATE 50 MG: 50 TABLET ORAL at 18:28

## 2018-08-04 RX ADMIN — PRAVASTATIN SODIUM 20 MG: 20 TABLET ORAL at 18:28

## 2018-08-04 NOTE — NURSING NOTE
Patient's BP re-assessed at 0227 and noted 194/80 with HR 67  Patient noted asymptomatic and without any complaint of headache, diplopia, or headache  Patient remains pleasant and without any complaints at this time  Patient medicated with prn Hydralazine  Will continue to monitor   SD 8/4/2018 0246

## 2018-08-04 NOTE — NURSING NOTE
Patient's left elbow noted reddened but blanchable  Patient stated she must have pressed her elbow too hard on the mattress  Placed Allevyn dressing as a protective measure  Patient appreciative of the care  Made nursing staff aware  Will continue to monitor   SD 8/4/2018 2062

## 2018-08-04 NOTE — NURSING NOTE
Patient's BP re-assessed at 0317 and noted 146/66 with HR of 66  Patient remains asymptomatic and without any complaints  Will continue to monitor   SD 8/4/2018 03/19/2018

## 2018-08-04 NOTE — PROGRESS NOTES
Progress Note - GI   Viktoria Mcmullen 80 y o  female MRN: [id number]  Unit/Bed#: [address] [place] [address] [id number] Encounter: [id number]      ASSESSMENT  · Upper GI bleed/gastric ulcer -treated with PPI and injection and BICAP during EGD  H pylori negative  No further signs or symptoms of bleeding  PLAN  · Continue PPI  · Observe for signs of GI bleed  · Advance diet  · Repeat EGD 6-8 weeks    Chief Complaint   Patient presents with    Black or Bloody Stool     pt presents to ED via EMS from home c/o of black loose clumpy stools  Bright red ring around the toilet of blood       SUBJECTIVE/HPI   No GI complaints  No nausea vomiting melena or hematochezia  Hungry would like some food  /70 (BP Location: Left arm)   Pulse 65   Temp 97 8 °F (36 6 °C) (Oral)   Resp 18   Ht 5' (1 524 m)   Wt 52 4 kg (115 lb 8 3 oz)   SpO2 93%   BMI 22 56 kg/m²       PHYSICALEXAM    HEENT negative for icterus  Oropharynx clear  Lungs clear to auscultation  Heart regular rhythm  Abdomen soft and nontender with normoactive bowel sounds  No mass or hepatosplenomegaly  No rebound or guarding  No evidence of ascites        Lab Results   Component Value Date    GLUCOSE 78 08/02/2018    CALCIUM 8 4 08/02/2018     08/02/2018    K 4 4 08/02/2018    CO2 24 08/02/2018     08/02/2018    BUN 28 (H) 08/02/2018    CREATININE 0 87 08/02/2018     Lab Results   Component Value Date    WBC 7 37 08/02/2018    HGB 9 5 (L) 08/04/2018    HCT 28 6 (L) 08/04/2018    MCV 94 08/02/2018     08/02/2018     Lab Results   Component Value Date    ALT 15 08/01/2018    AST 36 08/01/2018    ALKPHOS 44 (L) 08/01/2018    BILITOT 0 40 08/01/2018     No components found for: [id number]  Lab Results   Component Value Date    LIPASE 350 08/01/2018     No results found for: IRON, TIBC, FERRITIN  Lab Results   Component Value Date    INR 1 11 08/01/2018         Elma Purvis MD

## 2018-08-04 NOTE — PROGRESS NOTES
Progress Note - Godfrey Chamberlain 80 y o  female MRN: [id number]    Unit/Bed#: [address] [place] [address] 204-01 Encounter: [id number]      Assessment:    Principal Problem:    Gastric ulcer with hemorrhage  Active Problems:    GERD without esophagitis    Hyperlipidemia    Essential hypertension    Malignant neoplasm of upper-outer quadrant of right breast in female, estrogen receptor positive ([address] )    Gastrointestinal hemorrhage with melena    Acute blood loss anemia  Resolved Problems:    GI bleed      Plan: Will watch H&H 1 more day  DC IV fluids advanced diet  Discharge tomorrow  Subjective:   Patient feeling better  Tolerating the diet  Objective:     Vitals: Blood pressure 169/70, pulse 65, temperature 97 8 °F (36 6 °C), temperature source Oral, resp  rate 18, height 5' (1 524 m), weight 52 4 kg (115 lb 8 3 oz), SpO2 93 %  ,Body mass index is 22 56 kg/m²  Intake/Output Summary (Last 24 hours) at 08/04/18 0846  Last data filed at 08/04/18 0213   Gross per 24 hour   Intake                0 ml   Output              500 ml   Net             -500 ml       Physical Exam:    Alert and awake in no acute distress  Lungs clear to auscultation bilaterally  Heart regular rate and rythm, soft murmur  Abdomen soft, active bowel sounds, non-tender, non-distended  Extremities: no cyanosis, clubbing or edema        Invasive Devices     Peripheral Intravenous Line            Peripheral IV 08/01/18 Left Antecubital 2 days                            Lab, Imaging and other studies: I have personally reviewed pertinent reports         Results from last 7 days  Lab Units 08/04/18  0721 08/04/18  0107 08/03/18  1940  08/02/18  0502  08/01/18  0959   WBC Thousand/uL  --   --   --   --  7 37  --  7 27   HEMOGLOBIN g/dL 9 5* 8 5* 8 2*  < > 8 1*  < > 10 2*   HEMATOCRIT % 28 6* 26 5* 24 9*  < > 25 4*  < > 31 2*   PLATELETS Thousands/uL  --   --   --   --  159  --  211   NEUTROS PCT %  --   --   --   --   --   --  67   LYMPHS PCT %  --   --   -- --   --   --  22   MONOS PCT %  --   --   --   --   --   --  8   EOS PCT %  --   --   --   --   --   --  1   < > = values in this interval not displayed  Results from last 7 days  Lab Units 08/02/18  0502 08/01/18  0959   SODIUM mmol/L 140 138   POTASSIUM mmol/L 4 4 4 6   CHLORIDE mmol/L 107 100   CO2 mmol/L 24 31   BUN mg/dL 28* 43*   CREATININE mg/dL 0 87 1 10   CALCIUM mg/dL 8 4 8 8   TOTAL PROTEIN g/dL  --  6 9   BILIRUBIN TOTAL mg/dL  --  0 40   ALK PHOS U/L  --  44*   ALT U/L  --  15   AST U/L  --  36   GLUCOSE RANDOM mg/dL 78 155*     Lab Results   Component Value Date    CKTOTAL 46 07/13/2015       Results from last 7 days  Lab Units 08/01/18  0959   INR  1 11     Lab Results   Component Value Date    URINECX 10,000-19,000 cfu/ml  02/10/2018       Imaging:  No results found for this or any previous visit  Results for orders placed during the hospital encounter of 02/03/17   XR chest 2 views    Narrative CHEST     INDICATION:  Evaluate for foreign body  COMPARISON:  1/23/2015 chest radiograph    VIEWS:  Frontal and lateral projections; 2 images    FINDINGS:  There is a left-sided pacer generator device with intact leads  Heart shadow appears unremarkable  Atherosclerotic vascular calcifications are noted  The lungs are clear  No pneumothorax or pleural effusion  Stable right apical scarring  Visualized osseous structures appear within normal limits for the patient's age  There are surgical clips in the right axilla  Impression No active pulmonary disease  Workstation performed: [id number]         PATIENT CENTERED ROUNDS: I have performed rounds with the nursing staff  This note has been constructed using a voice recognition system      Dalia Veliz MD

## 2018-08-04 NOTE — NURSING NOTE
Patient has order for H&H blood draws continuous until specified  Checked with day shift RN on 8/3 during report to discuss potential DC but advised draw should be maintained unless discontinued by MD  Notified RN during morning report today   SD 8/4/2018 0786

## 2018-08-05 LAB
ANION GAP SERPL CALCULATED.3IONS-SCNC: 8 MMOL/L (ref 4–13)
BASOPHILS # BLD AUTO: 0.04 THOUSANDS/ΜL (ref 0–0.1)
BASOPHILS NFR BLD AUTO: 1 % (ref 0–1)
BUN SERPL-MCNC: 12 MG/DL (ref 5–25)
CALCIUM SERPL-MCNC: 8.5 MG/DL (ref 8.3–10.1)
CHLORIDE SERPL-SCNC: 105 MMOL/L (ref 100–108)
CO2 SERPL-SCNC: 26 MMOL/L (ref 21–32)
CREAT SERPL-MCNC: 1.04 MG/DL (ref 0.6–1.3)
EOSINOPHIL # BLD AUTO: 0.4 THOUSAND/ΜL (ref 0–0.61)
EOSINOPHIL NFR BLD AUTO: 5 % (ref 0–6)
ERYTHROCYTE [DISTWIDTH] IN BLOOD BY AUTOMATED COUNT: 14.6 % (ref 11.6–15.1)
GFR SERPL CREATININE-BSD FRML MDRD: 46 ML/MIN/1.73SQ M
GLUCOSE SERPL-MCNC: 98 MG/DL (ref 65–140)
HCT VFR BLD AUTO: 26.4 % (ref 34.8–46.1)
HGB BLD-MCNC: 8.6 G/DL (ref 11.5–15.4)
IMM GRANULOCYTES # BLD AUTO: 0.06 THOUSAND/UL (ref 0–0.2)
IMM GRANULOCYTES NFR BLD AUTO: 1 % (ref 0–2)
LYMPHOCYTES # BLD AUTO: 1.73 THOUSANDS/ΜL (ref 0.6–4.47)
LYMPHOCYTES NFR BLD AUTO: 22 % (ref 14–44)
MCH RBC QN AUTO: 30.3 PG (ref 26.8–34.3)
MCHC RBC AUTO-ENTMCNC: 32.6 G/DL (ref 31.4–37.4)
MCV RBC AUTO: 93 FL (ref 82–98)
MONOCYTES # BLD AUTO: 1.18 THOUSAND/ΜL (ref 0.17–1.22)
MONOCYTES NFR BLD AUTO: 15 % (ref 4–12)
NEUTROPHILS # BLD AUTO: 4.49 THOUSANDS/ΜL (ref 1.85–7.62)
NEUTS SEG NFR BLD AUTO: 56 % (ref 43–75)
NRBC BLD AUTO-RTO: 0 /100 WBCS
PLATELET # BLD AUTO: 197 THOUSANDS/UL (ref 149–390)
PMV BLD AUTO: 9.8 FL (ref 8.9–12.7)
POTASSIUM SERPL-SCNC: 4 MMOL/L (ref 3.5–5.3)
RBC # BLD AUTO: 2.84 MILLION/UL (ref 3.81–5.12)
SODIUM SERPL-SCNC: 139 MMOL/L (ref 136–145)
WBC # BLD AUTO: 7.9 THOUSAND/UL (ref 4.31–10.16)

## 2018-08-05 PROCEDURE — 99232 SBSQ HOSP IP/OBS MODERATE 35: CPT | Performed by: INTERNAL MEDICINE

## 2018-08-05 PROCEDURE — 80048 BASIC METABOLIC PNL TOTAL CA: CPT | Performed by: INTERNAL MEDICINE

## 2018-08-05 PROCEDURE — 85025 COMPLETE CBC W/AUTO DIFF WBC: CPT | Performed by: INTERNAL MEDICINE

## 2018-08-05 RX ORDER — LOSARTAN POTASSIUM 50 MG/1
50 TABLET ORAL DAILY
Status: DISCONTINUED | OUTPATIENT
Start: 2018-08-05 | End: 2018-08-08 | Stop reason: HOSPADM

## 2018-08-05 RX ORDER — LORAZEPAM 0.5 MG/1
0.5 TABLET ORAL EVERY 8 HOURS PRN
Status: DISCONTINUED | OUTPATIENT
Start: 2018-08-05 | End: 2018-08-08 | Stop reason: HOSPADM

## 2018-08-05 RX ADMIN — IRON SUCROSE 100 MG: 20 INJECTION, SOLUTION INTRAVENOUS at 09:27

## 2018-08-05 RX ADMIN — PANTOPRAZOLE SODIUM 40 MG: 40 TABLET, DELAYED RELEASE ORAL at 17:31

## 2018-08-05 RX ADMIN — METOPROLOL TARTRATE 50 MG: 50 TABLET ORAL at 17:31

## 2018-08-05 RX ADMIN — PRAVASTATIN SODIUM 20 MG: 20 TABLET ORAL at 17:31

## 2018-08-05 RX ADMIN — LORAZEPAM 0.5 MG: 0.5 TABLET ORAL at 09:22

## 2018-08-05 RX ADMIN — PANTOPRAZOLE SODIUM 40 MG: 40 TABLET, DELAYED RELEASE ORAL at 06:30

## 2018-08-05 RX ADMIN — METOPROLOL TARTRATE 50 MG: 50 TABLET ORAL at 09:22

## 2018-08-05 RX ADMIN — LOSARTAN POTASSIUM 50 MG: 50 TABLET ORAL at 09:22

## 2018-08-05 NOTE — PROGRESS NOTES
Progress Note - Brian Jordan 80 y o  female MRN: [id number]    Unit/Bed#: [address] [place] [address] 204-01 Encounter: [id number]      Assessment:    Principal Problem:    Gastric ulcer with hemorrhage  Active Problems:    GERD without esophagitis    Hyperlipidemia    Essential hypertension    Malignant neoplasm of upper-outer quadrant of right breast in female, estrogen receptor positive ([address] )    Acute blood loss anemia  Resolved Problems:    GI bleed      Plan: Will observe 24 hours more to make sure she is no longer bleeding  Will give her some IV iron  I have added losartan for her blood pressure  Subjective:   Had some maroonish bowel movements  Concern that she could still be bleeding    Objective:     Vitals: Blood pressure (!) 198/86, pulse 81, temperature 98 6 °F (37 °C), temperature source Oral, resp  rate 18, height 5' (1 524 m), weight 49 8 kg (109 lb 12 6 oz), SpO2 93 %  ,Body mass index is 21 44 kg/m²  Blood pressure 140/72 taken by me at this time    Intake/Output Summary (Last 24 hours) at 08/05/18 0756  Last data filed at 08/05/18 0501   Gross per 24 hour   Intake              100 ml   Output              475 ml   Net             -375 ml       Physical Exam:    Alert and awake in no acute distress  Lungs clear to auscultation bilaterally  Heart regular rate and rythm, normal heart sounds systolic murmur  Abdomen soft, active bowel sounds, non-tender, non-distended  Extremities: no cyanosis, clubbing or edema          Invasive Devices     Peripheral Intravenous Line            Peripheral IV 08/01/18 Left Antecubital 3 days                            Lab, Imaging and other studies: I have personally reviewed pertinent reports         Results from last 7 days  Lab Units 08/05/18  0432 08/04/18  0721 08/04/18  0107  08/02/18  0502  08/01/18  0959   WBC Thousand/uL 7 90  --   --   --  7 37  --  7 27   HEMOGLOBIN g/dL 8 6* 9 5* 8 5*  < > 8 1*  < > 10 2*   HEMATOCRIT % 26 4* 28 6* 26 5*  < > 25 4*  < > 31 2* PLATELETS Thousands/uL 197  --   --   --  159  --  211   NEUTROS PCT % 56  --   --   --   --   --  67   LYMPHS PCT % 22  --   --   --   --   --  22   MONOS PCT % 15*  --   --   --   --   --  8   EOS PCT % 5  --   --   --   --   --  1   < > = values in this interval not displayed  Results from last 7 days  Lab Units 08/05/18  0432 08/02/18  0502 08/01/18  0959   SODIUM mmol/L 139 140 138   POTASSIUM mmol/L 4 0 4 4 4 6   CHLORIDE mmol/L 105 107 100   CO2 mmol/L 26 24 31   BUN mg/dL 12 28* 43*   CREATININE mg/dL 1 04 0 87 1 10   CALCIUM mg/dL 8 5 8 4 8 8   TOTAL PROTEIN g/dL  --   --  6 9   BILIRUBIN TOTAL mg/dL  --   --  0 40   ALK PHOS U/L  --   --  44*   ALT U/L  --   --  15   AST U/L  --   --  36   GLUCOSE RANDOM mg/dL 98 78 155*     Lab Results   Component Value Date    CKTOTAL 46 07/13/2015       Results from last 7 days  Lab Units 08/01/18  0959   INR  1 11     Lab Results   Component Value Date    URINECX 10,000-19,000 cfu/ml  02/10/2018       Imaging:  No results found for this or any previous visit  Results for orders placed during the hospital encounter of 02/03/17   XR chest 2 views    Narrative CHEST     INDICATION:  Evaluate for foreign body  COMPARISON:  1/23/2015 chest radiograph    VIEWS:  Frontal and lateral projections; 2 images    FINDINGS:  There is a left-sided pacer generator device with intact leads  Heart shadow appears unremarkable  Atherosclerotic vascular calcifications are noted  The lungs are clear  No pneumothorax or pleural effusion  Stable right apical scarring  Visualized osseous structures appear within normal limits for the patient's age  There are surgical clips in the right axilla  Impression No active pulmonary disease  Workstation performed: [id number]         PATIENT CENTERED ROUNDS: I have performed rounds with the nursing staff  This note has been constructed using a voice recognition system      Samson Miller MD

## 2018-08-05 NOTE — PROGRESS NOTES
Progress Note - GI   Eddye Alirio 80 y o  female MRN: [id number]  Unit/Bed#: [address] [place] [address] 204-01 Encounter: [id number]      ASSESSMENT  · Upper GI bleed/gastric ulcer/blood loss anemia - treated with PPI and EGD utilizing BICAP and injection  H pylori negative  One dark stool today and slight decrease in hemoglobin  Agree with continued observation to assess of rebleeding  If signs of ongoing bleeding consider repeat EGD, consider lower evaluation  PLAN  · Continue PPI  · Follow H&H  · Diet as tolerated  · Is stable would likely be okay for discharge tomorrow  If ongoing bleeding consider EGD, consider lower evaluation  Chief Complaint   Patient presents with    Black or Bloody Stool     pt presents to ED via EMS from home c/o of black loose clumpy stools  Bright red ring around the toilet of blood       SUBJECTIVE/HPI   Had 1 dark bowel movement this morning  Denies abdominal pain but still slight dyspepsia  Denies shortness of breath or chest pain  Tolerating p o  but not hungry now as she is just found out that her brother passed away last night  /78 (BP Location: Left arm)   Pulse 81   Temp 98 6 °F (37 °C) (Oral)   Resp 18   Ht 5' (1 524 m)   Wt 49 8 kg (109 lb 12 6 oz)   SpO2 93%   BMI 21 44 kg/m²       PHYSICALEXAM    Awake alert oriented  Lungs clear  Heart S1-S2  Abdomen soft and nontender with normoactive bowel sounds        Lab Results   Component Value Date    GLUCOSE 98 08/05/2018    CALCIUM 8 5 08/05/2018     08/05/2018    K 4 0 08/05/2018    CO2 26 08/05/2018     08/05/2018    BUN 12 08/05/2018    CREATININE 1 04 08/05/2018     Lab Results   Component Value Date    WBC 7 90 08/05/2018    HGB 8 6 (L) 08/05/2018    HCT 26 4 (L) 08/05/2018    MCV 93 08/05/2018     08/05/2018     Lab Results   Component Value Date    ALT 15 08/01/2018    AST 36 08/01/2018    ALKPHOS 44 (L) 08/01/2018    BILITOT 0 40 08/01/2018     No components found for: [id number]  Lab Results   Component Value Date    LIPASE 350 08/01/2018     No results found for: IRON, TIBC, FERRITIN  Lab Results   Component Value Date    INR 1 11 08/01/2018         Vin Woody MD

## 2018-08-05 NOTE — PROGRESS NOTES
Patient expressed pain behind her right knee where she has a lump that she says is new  Going to make the physician aware and monitor

## 2018-08-06 ENCOUNTER — APPOINTMENT (INPATIENT)
Dept: RADIOLOGY | Facility: HOSPITAL | Age: 83
DRG: 378 | End: 2018-08-06
Payer: MEDICARE

## 2018-08-06 LAB
BASOPHILS # BLD AUTO: 0.05 THOUSANDS/ΜL (ref 0–0.1)
BASOPHILS NFR BLD AUTO: 0 % (ref 0–1)
EOSINOPHIL # BLD AUTO: 0.21 THOUSAND/ΜL (ref 0–0.61)
EOSINOPHIL NFR BLD AUTO: 2 % (ref 0–6)
ERYTHROCYTE [DISTWIDTH] IN BLOOD BY AUTOMATED COUNT: 14.9 % (ref 11.6–15.1)
HCT VFR BLD AUTO: 28.4 % (ref 34.8–46.1)
HGB BLD-MCNC: 9.1 G/DL (ref 11.5–15.4)
IMM GRANULOCYTES # BLD AUTO: 0.12 THOUSAND/UL (ref 0–0.2)
IMM GRANULOCYTES NFR BLD AUTO: 1 % (ref 0–2)
LYMPHOCYTES # BLD AUTO: 1.83 THOUSANDS/ΜL (ref 0.6–4.47)
LYMPHOCYTES NFR BLD AUTO: 15 % (ref 14–44)
MCH RBC QN AUTO: 30 PG (ref 26.8–34.3)
MCHC RBC AUTO-ENTMCNC: 32 G/DL (ref 31.4–37.4)
MCV RBC AUTO: 94 FL (ref 82–98)
MONOCYTES # BLD AUTO: 1.61 THOUSAND/ΜL (ref 0.17–1.22)
MONOCYTES NFR BLD AUTO: 13 % (ref 4–12)
NEUTROPHILS # BLD AUTO: 8.26 THOUSANDS/ΜL (ref 1.85–7.62)
NEUTS SEG NFR BLD AUTO: 69 % (ref 43–75)
NRBC BLD AUTO-RTO: 0 /100 WBCS
PLATELET # BLD AUTO: 217 THOUSANDS/UL (ref 149–390)
PMV BLD AUTO: 9.9 FL (ref 8.9–12.7)
RBC # BLD AUTO: 3.03 MILLION/UL (ref 3.81–5.12)
WBC # BLD AUTO: 12.08 THOUSAND/UL (ref 4.31–10.16)

## 2018-08-06 PROCEDURE — 85025 COMPLETE CBC W/AUTO DIFF WBC: CPT | Performed by: INTERNAL MEDICINE

## 2018-08-06 PROCEDURE — 71045 X-RAY EXAM CHEST 1 VIEW: CPT

## 2018-08-06 PROCEDURE — 73564 X-RAY EXAM KNEE 4 OR MORE: CPT

## 2018-08-06 PROCEDURE — 99232 SBSQ HOSP IP/OBS MODERATE 35: CPT | Performed by: INTERNAL MEDICINE

## 2018-08-06 RX ORDER — DOCUSATE SODIUM 100 MG/1
100 CAPSULE, LIQUID FILLED ORAL 2 TIMES DAILY
Status: DISCONTINUED | OUTPATIENT
Start: 2018-08-06 | End: 2018-08-08 | Stop reason: HOSPADM

## 2018-08-06 RX ORDER — POLYETHYLENE GLYCOL 3350 17 G/17G
17 POWDER, FOR SOLUTION ORAL DAILY
Status: DISCONTINUED | OUTPATIENT
Start: 2018-08-06 | End: 2018-08-07

## 2018-08-06 RX ORDER — ACETAMINOPHEN 325 MG/1
650 TABLET ORAL EVERY 6 HOURS PRN
Status: DISCONTINUED | OUTPATIENT
Start: 2018-08-06 | End: 2018-08-08 | Stop reason: HOSPADM

## 2018-08-06 RX ADMIN — METOPROLOL TARTRATE 50 MG: 50 TABLET ORAL at 17:56

## 2018-08-06 RX ADMIN — PRAVASTATIN SODIUM 20 MG: 20 TABLET ORAL at 17:56

## 2018-08-06 RX ADMIN — LOSARTAN POTASSIUM 50 MG: 50 TABLET ORAL at 08:45

## 2018-08-06 RX ADMIN — ACETAMINOPHEN 650 MG: 325 TABLET, FILM COATED ORAL at 18:25

## 2018-08-06 RX ADMIN — DOCUSATE SODIUM 100 MG: 100 CAPSULE, LIQUID FILLED ORAL at 17:56

## 2018-08-06 RX ADMIN — PANTOPRAZOLE SODIUM 40 MG: 40 TABLET, DELAYED RELEASE ORAL at 05:43

## 2018-08-06 RX ADMIN — METOPROLOL TARTRATE 50 MG: 50 TABLET ORAL at 08:45

## 2018-08-06 RX ADMIN — LORAZEPAM 0.5 MG: 0.5 TABLET ORAL at 16:20

## 2018-08-06 RX ADMIN — DOCUSATE SODIUM 100 MG: 100 CAPSULE, LIQUID FILLED ORAL at 09:52

## 2018-08-06 RX ADMIN — PANTOPRAZOLE SODIUM 40 MG: 40 TABLET, DELAYED RELEASE ORAL at 17:56

## 2018-08-06 RX ADMIN — POLYETHYLENE GLYCOL 3350 17 G: 17 POWDER, FOR SOLUTION ORAL at 09:52

## 2018-08-06 NOTE — PROGRESS NOTES
Progress Note - Jacquelyn Hidden 80 y o  female MRN: [id number]  Unit/Bed#: [address] [place] [address] 204-01 Encounter: [id number]    Assessment:  Principal Problem:    Gastric ulcer with hemorrhage  Active Problems:    GERD without esophagitis    Hyperlipidemia    Essential hypertension    Malignant neoplasm of upper-outer quadrant of right breast in female, estrogen receptor positive ([address] )    Acute blood loss anemia  Resolved Problems:    GI bleed      Plan:  · Upper GI bleed/gastric ulcer/acute blood loss anemia  H&H is stable  Continue Protonix  GI follow-up noted  On MiraLax and Colace  · Right knee pain  Will order x-rays  Patient is complaining of pain in her medial side of the right knee  Pain management  Follow-up PT consult  · Hypertension-on Lopressor  · GI/DVT prophylaxis  · Discussed with patient in detail  Subjective:   Patient is seen and examined at bedside  Complains of right knee/medial aspect pain which is 5/10 intensity, sharp, constant  Also complains of having some nausea  Denies any other complaints  All other ROS are negative  Objective:   Vitals: Blood pressure 126/58, pulse 81, temperature 98 6 °F (37 °C), temperature source Oral, resp  rate 18, height 5' (1 524 m), weight 50 1 kg (110 lb 7 2 oz), SpO2 92 %  ,Body mass index is 21 57 kg/m²  SPO2 RA Rest      ED to Hosp-Admission (Current) from 8/1/2018 in [facility] Unit   SpO2  92 %   SpO2 Activity  At Rest   O2 Device  None (Room air)   O2 Flow Rate  94 L/min        I&O:   Intake/Output Summary (Last 24 hours) at 08/06/18 1236  Last data filed at 08/06/18 1001   Gross per 24 hour   Intake              120 ml   Output              400 ml   Net             -280 ml       Physical Exam:    General- Alert, lying comfortably in bed  Not in any acute distress  HEENT- BOBY, EOM intact  Neck- Supple, No JVD  CVS- regular, S1 and S2 normal  Chest- Bilateral Air entry, No rhochi, crackles or wheezing present    Abdomen- soft, nontender, not distended, no guarding or rigidity, BS+  Extremities-  No pedal edema, No calf tenderness                         Normal ROM in all extremities  CNS-   Alert, awake and orientedx3  No focal deficits present      Invasive Devices     Peripheral Intravenous Line            Peripheral IV 08/06/18 Left Arm less than 1 day                      Social History  reviewed  Family History   Problem Relation Age of Onset    Heart disease Mother     Heart disease Father     reviewed    Meds:  Current Facility-Administered Medications   Medication Dose Route Frequency Provider Last Rate Last Dose    docusate sodium (COLACE) capsule 100 mg  100 mg Oral BID JAZMIN Muniz   100 mg at 08/06/18 8785    hydrALAZINE (APRESOLINE) injection 10 mg  10 mg Intravenous Q6H PRN JAZMIN Connell   10 mg at 08/04/18 0233    iodixanol (VISIPAQUE) 320 MG/ML injection 100 mL  100 mL Intravenous Once in imaging Sarahi Chacon DO        LORazepam (ATIVAN) tablet 0 5 mg  0 5 mg Oral Q8H PRN Man Scott MD   0 5 mg at 08/05/18 8158    losartan (COZAAR) tablet 50 mg  50 mg Oral Daily Man Scott MD   50 mg at 08/06/18 0845    metoprolol tartrate (LOPRESSOR) tablet 50 mg  50 mg Oral BID Man Scott MD   50 mg at 08/06/18 0845    ondansetron (ZOFRAN) injection 4 mg  4 mg Intravenous Once Man Scott MD        pantoprazole (PROTONIX) EC tablet 40 mg  40 mg Oral BID AC Man Scott MD   40 mg at 08/06/18 0543    polyethylene glycol (MIRALAX) packet 17 g  17 g Oral Daily JAZMIN Saleem   17 g at 08/06/18 4825    pravastatin (PRAVACHOL) tablet 20 mg  20 mg Oral Daily With Loraine Aguilera MD   20 mg at 08/05/18 1731      Facility-Administered Medications Prior to Admission   Medication    aluminum-magnesium hydroxide-simethicone (MYLANTA) 200-200-20 mg/5 mL oral suspension 30 mL    lidocaine viscous (XYLOCAINE) 2 % mucosal solution 15 mL     Prescriptions Prior to Admission   Medication    aspirin 81 mg chewable tablet    aspirin 81 MG tablet    calcium citrate-vitamin D (CITRACAL+D) 315-200 MG-UNIT per tablet    famotidine (PEPCID) 20 mg tablet    hydrOXYzine HCL (ATARAX) 25 mg tablet    LORazepam (ATIVAN) 0 5 mg tablet    metoprolol tartrate (LOPRESSOR) 50 mg tablet    Multiple Vitamins-Minerals (CENTRUM SILVER ADULT 50+ PO)    Multiple Vitamins-Minerals (CENTRUM SILVER ULTRA WOMENS) TABS    Omega-3 Fatty Acids (FISH OIL) 645 MG CAPS    pravastatin (PRAVACHOL) 20 mg tablet       Labs:    Results from last 7 days  Lab Units 08/06/18  0447 08/05/18  0432 08/04/18  0721  08/02/18  0502  08/01/18  0959   WBC Thousand/uL 12 08* 7 90  --   --  7 37  --  7 27   HEMOGLOBIN g/dL 9 1* 8 6* 9 5*  < > 8 1*  < > 10 2*   HEMATOCRIT % 28 4* 26 4* 28 6*  < > 25 4*  < > 31 2*   PLATELETS Thousands/uL 217 197  --   --  159  --  211   NEUTROS PCT % 69 56  --   --   --   --  67   LYMPHS PCT % 15 22  --   --   --   --  22   MONOS PCT % 13* 15*  --   --   --   --  8   EOS PCT % 2 5  --   --   --   --  1   < > = values in this interval not displayed  Results from last 7 days  Lab Units 08/05/18  0432 08/02/18  0502 08/01/18  0959   SODIUM mmol/L 139 140 138   POTASSIUM mmol/L 4 0 4 4 4 6   CHLORIDE mmol/L 105 107 100   CO2 mmol/L 26 24 31   BUN mg/dL 12 28* 43*   CREATININE mg/dL 1 04 0 87 1 10   CALCIUM mg/dL 8 5 8 4 8 8   TOTAL PROTEIN g/dL  --   --  6 9   BILIRUBIN TOTAL mg/dL  --   --  0 40   ALK PHOS U/L  --   --  44*   ALT U/L  --   --  15   AST U/L  --   --  36   GLUCOSE RANDOM mg/dL 98 78 155*     Lab Results   Component Value Date    CKTOTAL 46 07/13/2015       Results from last 7 days  Lab Units 08/01/18  0959   INR  1 11     Lab Results   Component Value Date    URINECX 10,000-19,000 cfu/ml  02/10/2018         Imaging:  No results found for this or any previous visit    Results for orders placed during the hospital encounter of 02/03/17   XR chest 2 views    Narrative CHEST     INDICATION:  Evaluate for foreign body  COMPARISON:  1/23/2015 chest radiograph    VIEWS:  Frontal and lateral projections; 2 images    FINDINGS:  There is a left-sided pacer generator device with intact leads  Heart shadow appears unremarkable  Atherosclerotic vascular calcifications are noted  The lungs are clear  No pneumothorax or pleural effusion  Stable right apical scarring  Visualized osseous structures appear within normal limits for the patient's age  There are surgical clips in the right axilla  Impression No active pulmonary disease  Workstation performed: [id number]         Labs & Imaging: I have personally reviewed pertinent reports        VTE Pharmacologic Prophylaxis: Reason for no pharmacologic prophylaxis GI bleed  VTE Mechanical Prophylaxis: sequential compression device    Code Status:   Level 3 - DNAR and DNI      "This note has been constructed using a voice recognition system"      Sammy Gonzalez MD  8/6/2018,12:36 PM

## 2018-08-06 NOTE — PROGRESS NOTES
Patient continues to have pain, discomfort behind her right knee  A palpatable lump is there  Patient continues to have SCDs on lower legs

## 2018-08-06 NOTE — PLAN OF CARE
DISCHARGE PLANNING     Discharge to home or other facility with appropriate resources Progressing        DISCHARGE PLANNING - CARE MANAGEMENT     Discharge to post-acute care or home with appropriate resources Progressing        HEMATOLOGIC - ADULT     Maintains hematologic stability Progressing        INFECTION - ADULT     Absence or prevention of progression during hospitalization Progressing     Absence of fever/infection during neutropenic period Progressing        Knowledge Deficit     Patient/family/caregiver demonstrates understanding of disease process, treatment plan, medications, and discharge instructions Progressing        Potential for Falls     Patient will remain free of falls Progressing        Prexisting or High Potential for Compromised Skin Integrity     Skin integrity is maintained or improved Progressing        SAFETY ADULT     Maintain or return to baseline ADL function Progressing     Maintain or return mobility status to optimal level Progressing

## 2018-08-06 NOTE — PROGRESS NOTES
Patient felt hot, and she has a fever of 101 axillary  Informed Dr Marky Álvarez of fever  Orders for chest XR and tylenol

## 2018-08-06 NOTE — CASE MANAGEMENT
Continued Stay Review    Date: 8/5/2018    Vital Signs: Blood pressure (!) 198/86, pulse 81, temperature 98 6 °F (37 °C), temperature source Oral, resp  rate 18, height 5' (1 524 m), weight 49 8 kg (109 lb 12 6 oz), SpO2 93 %  ,Body mass index is 21 44 kg/m²  Blood pressure 140/72 taken by me at this time    Medications:   Scheduled Meds:   Current Facility-Administered Medications:  docusate sodium 100 mg Oral BID   hydrALAZINE 10 mg Intravenous Q6H PRN   iodixanol 100 mL Intravenous Once in imaging   LORazepam 0 5 mg Oral Q8H PRN   losartan 50 mg Oral Daily   metoprolol tartrate 50 mg Oral BID   ondansetron 4 mg Intravenous Once   pantoprazole 40 mg Oral BID AC   polyethylene glycol 17 g Oral Daily   pravastatin 20 mg Oral Daily With Dinner     Continuous Infusions:    PRN Meds:    LORazepam - used x 1  Abnormal Labs/Diagnostic Results:   Wbc 7 90, hgb 8 6, hct 26 4  Age/Sex: 80 y o  female     Assessment/Plan: Principal Problem:    Gastric ulcer with hemorrhage  Active Problems:    GERD without esophagitis    Hyperlipidemia    Essential hypertension    Malignant neoplasm of upper-outer quadrant of right breast in female, estrogen receptor positive ([facility] [address] )    Acute blood loss anemia  Resolved Problems:    GI bleed        Plan: Will observe 24 hours more to make sure she is no longer bleeding  Will give her some IV iron      I have added losartan for her blood pressure      Discharge Plan: PT recommends home PT

## 2018-08-06 NOTE — PROGRESS NOTES
Tish Hays  [id number]    25 y o   female      ASSESSMENT  1  Upper GI bleed/gastric ulcer/blood loss anemia - treated with PPI and EGD utilizing BICAP and injection  H pylori negative  bleeding appears to have stopped as no further melena and hemoglobin up to 8 6 g ms  2  Constipation - chronic    PLAN  1  Continue PPI  2  Follow H&H  3   Stable for discharge today from a GI perspective  4  Add MiraLax/Colace    Chief Complaint   Patient presents with    Black or Bloody Stool     pt presents to ED via EMS from home c/o of black loose clumpy stools  Bright red ring around the toilet of blood       SUBJECTIVE/HPI   Appetite fair  Denies any nausea, vomiting, melena or hematemesis  Has not had a bowel movement in a few days and feels constipated      BP (!) 177/72 (BP Location: Left arm)   Pulse 96   Temp 98 6 °F (37 °C) (Oral)   Resp 18   Ht 5' (1 524 m)   Wt 50 1 kg (110 lb 7 2 oz)   SpO2 92%   BMI 21 57 kg/m²       PHYSICALEXAM  Constitutional:  Elderly female, no acute distress, non-toxic appearance   Eyes:  conjunctiva pale  HENT:  Atraumatic  Respiratory:  No respiratory distress  Cardiovascular:  Normal rate   GI:  Soft, nondistended, normal bowel sounds, nontender  Musculoskeletal:  No edema   Neurologic:  Alert & oriented x 3      Lab Results   Component Value Date    GLUCOSE 98 08/05/2018    CALCIUM 8 5 08/05/2018     08/05/2018    K 4 0 08/05/2018    CO2 26 08/05/2018     08/05/2018    BUN 12 08/05/2018    CREATININE 1 04 08/05/2018     Lab Results   Component Value Date    WBC 12 08 (H) 08/06/2018    HGB 9 1 (L) 08/06/2018    HCT 28 4 (L) 08/06/2018    MCV 94 08/06/2018     08/06/2018     Lab Results   Component Value Date    ALT 15 08/01/2018    AST 36 08/01/2018    ALKPHOS 44 (L) 08/01/2018    BILITOT 0 40 08/01/2018     No results found for: AMYLASE  Lab Results   Component Value Date    LIPASE 350 08/01/2018     No results found for: IRON, TIBC, FERRITIN  Lab Results   Component Value Date    INR 1 11 08/01/2018       Counseling / Coordination of Care  Total floor / unit time spent today 25 minutes  Greater than 50% of total time was spent with the patient and / or family counseling and / or coordination of care  A description of the counseling / coordination of care: Agus Ba

## 2018-08-06 NOTE — SOCIAL WORK
Continue to follow  Spoke with Pt's dtr(Claudette: [phone]), agreeable for [facility] services for Pt if Pt agreeable  Met with Pt, Pt agreeable for home SN/PT/OT  Freedom of Choice given  Pt agreeable for [facility]  Referral sent to [facility] for SN/PT/OT via [facility]  Will follow

## 2018-08-07 LAB
ANION GAP SERPL CALCULATED.3IONS-SCNC: 8 MMOL/L (ref 4–13)
APPEARANCE FLD: ABNORMAL
BASOPHILS # BLD AUTO: 0.05 THOUSANDS/ΜL (ref 0–0.1)
BASOPHILS NFR BLD AUTO: 1 % (ref 0–1)
BUN SERPL-MCNC: 16 MG/DL (ref 5–25)
CALCIUM SERPL-MCNC: 8.2 MG/DL (ref 8.3–10.1)
CHLORIDE SERPL-SCNC: 104 MMOL/L (ref 100–108)
CO2 SERPL-SCNC: 26 MMOL/L (ref 21–32)
COLOR FLD: YELLOW
CREAT SERPL-MCNC: 1.12 MG/DL (ref 0.6–1.3)
CRYSTALS SNV QL MICRO: NORMAL
EOSINOPHIL # BLD AUTO: 0.16 THOUSAND/ΜL (ref 0–0.61)
EOSINOPHIL NFR BLD AUTO: 2 % (ref 0–6)
ERYTHROCYTE [DISTWIDTH] IN BLOOD BY AUTOMATED COUNT: 15 % (ref 11.6–15.1)
GFR SERPL CREATININE-BSD FRML MDRD: 42 ML/MIN/1.73SQ M
GLUCOSE SERPL-MCNC: 102 MG/DL (ref 65–140)
HCT VFR BLD AUTO: 26.2 % (ref 34.8–46.1)
HGB BLD-MCNC: 8.6 G/DL (ref 11.5–15.4)
IMM GRANULOCYTES # BLD AUTO: 0.1 THOUSAND/UL (ref 0–0.2)
IMM GRANULOCYTES NFR BLD AUTO: 1 % (ref 0–2)
LYMPHOCYTES # BLD AUTO: 1.43 THOUSANDS/ΜL (ref 0.6–4.47)
LYMPHOCYTES NFR BLD AUTO: 15 % (ref 14–44)
MCH RBC QN AUTO: 30.5 PG (ref 26.8–34.3)
MCHC RBC AUTO-ENTMCNC: 32.8 G/DL (ref 31.4–37.4)
MCV RBC AUTO: 93 FL (ref 82–98)
MONOCYTES # BLD AUTO: 1.4 THOUSAND/ΜL (ref 0.17–1.22)
MONOCYTES NFR BLD AUTO: 15 % (ref 4–12)
MONOCYTES NFR SNV MANUAL: 6 %
NEUTROPHILS # BLD AUTO: 6.28 THOUSANDS/ΜL (ref 1.85–7.62)
NEUTROPHILS NFR SNV MANUAL: 94 %
NEUTS SEG NFR BLD AUTO: 66 % (ref 43–75)
NRBC BLD AUTO-RTO: 0 /100 WBCS
PLATELET # BLD AUTO: 192 THOUSANDS/UL (ref 149–390)
PMV BLD AUTO: 9.8 FL (ref 8.9–12.7)
POTASSIUM SERPL-SCNC: 4.1 MMOL/L (ref 3.5–5.3)
RBC # BLD AUTO: 2.82 MILLION/UL (ref 3.81–5.12)
SITE: ABNORMAL
SODIUM SERPL-SCNC: 138 MMOL/L (ref 136–145)
TOTAL CELLS COUNTED SPEC: 100
WBC # BLD AUTO: 9.42 THOUSAND/UL (ref 4.31–10.16)
WBC # FLD MANUAL: ABNORMAL /UL (ref 0–200)

## 2018-08-07 PROCEDURE — 99232 SBSQ HOSP IP/OBS MODERATE 35: CPT | Performed by: INTERNAL MEDICINE

## 2018-08-07 PROCEDURE — 3E0U33Z INTRODUCTION OF ANTI-INFLAMMATORY INTO JOINTS, PERCUTANEOUS APPROACH: ICD-10-PCS | Performed by: ORTHOPAEDIC SURGERY

## 2018-08-07 PROCEDURE — 85025 COMPLETE CBC W/AUTO DIFF WBC: CPT | Performed by: INTERNAL MEDICINE

## 2018-08-07 PROCEDURE — 99222 1ST HOSP IP/OBS MODERATE 55: CPT | Performed by: ORTHOPAEDIC SURGERY

## 2018-08-07 PROCEDURE — 89060 EXAM SYNOVIAL FLUID CRYSTALS: CPT | Performed by: PHYSICIAN ASSISTANT

## 2018-08-07 PROCEDURE — 0S9C3ZX DRAINAGE OF RIGHT KNEE JOINT, PERCUTANEOUS APPROACH, DIAGNOSTIC: ICD-10-PCS | Performed by: ORTHOPAEDIC SURGERY

## 2018-08-07 PROCEDURE — 80048 BASIC METABOLIC PNL TOTAL CA: CPT | Performed by: INTERNAL MEDICINE

## 2018-08-07 PROCEDURE — 89051 BODY FLUID CELL COUNT: CPT | Performed by: PHYSICIAN ASSISTANT

## 2018-08-07 PROCEDURE — 20610 DRAIN/INJ JOINT/BURSA W/O US: CPT | Performed by: PHYSICIAN ASSISTANT

## 2018-08-07 PROCEDURE — 97530 THERAPEUTIC ACTIVITIES: CPT

## 2018-08-07 RX ORDER — LIDOCAINE HYDROCHLORIDE 10 MG/ML
10 INJECTION, SOLUTION EPIDURAL; INFILTRATION; INTRACAUDAL; PERINEURAL ONCE
Status: COMPLETED | OUTPATIENT
Start: 2018-08-07 | End: 2018-08-07

## 2018-08-07 RX ORDER — METHYLPREDNISOLONE ACETATE 80 MG/ML
80 INJECTION, SUSPENSION INTRA-ARTICULAR; INTRALESIONAL; INTRAMUSCULAR; SOFT TISSUE ONCE
Status: COMPLETED | OUTPATIENT
Start: 2018-08-07 | End: 2018-08-07

## 2018-08-07 RX ORDER — BISACODYL 10 MG
10 SUPPOSITORY, RECTAL RECTAL DAILY PRN
Status: DISCONTINUED | OUTPATIENT
Start: 2018-08-07 | End: 2018-08-08 | Stop reason: HOSPADM

## 2018-08-07 RX ADMIN — LIDOCAINE HYDROCHLORIDE 10 ML: 10 INJECTION, SOLUTION EPIDURAL; INFILTRATION; INTRACAUDAL; PERINEURAL at 08:20

## 2018-08-07 RX ADMIN — METOPROLOL TARTRATE 50 MG: 50 TABLET ORAL at 08:18

## 2018-08-07 RX ADMIN — POLYETHYLENE GLYCOL 3350 17 G: 17 POWDER, FOR SOLUTION ORAL at 08:18

## 2018-08-07 RX ADMIN — DOCUSATE SODIUM 100 MG: 100 CAPSULE, LIQUID FILLED ORAL at 08:18

## 2018-08-07 RX ADMIN — LOSARTAN POTASSIUM 50 MG: 50 TABLET ORAL at 08:18

## 2018-08-07 RX ADMIN — PRAVASTATIN SODIUM 20 MG: 20 TABLET ORAL at 17:17

## 2018-08-07 RX ADMIN — METOPROLOL TARTRATE 50 MG: 50 TABLET ORAL at 17:17

## 2018-08-07 RX ADMIN — DOCUSATE SODIUM 100 MG: 100 CAPSULE, LIQUID FILLED ORAL at 17:17

## 2018-08-07 RX ADMIN — METHYLPREDNISOLONE ACETATE 80 MG: 80 INJECTION, SUSPENSION INTRA-ARTICULAR; INTRALESIONAL; INTRAMUSCULAR; SOFT TISSUE at 08:20

## 2018-08-07 RX ADMIN — PANTOPRAZOLE SODIUM 40 MG: 40 TABLET, DELAYED RELEASE ORAL at 17:17

## 2018-08-07 RX ADMIN — PANTOPRAZOLE SODIUM 40 MG: 40 TABLET, DELAYED RELEASE ORAL at 05:37

## 2018-08-07 NOTE — SOCIAL WORK
Continue to follow   informed that Pt will need SNF upon discharge  Met with Pt and Pt's niece at bedside  Pt and Pt's niece in agreement for SNF upon discharge  Freedom of Choice given  Pt requesting [facility]  Spoke with Pt's dtr(Claudette: [phone]), aware of SNF recommendation and in agreement  Claudette informed  that she spoke with Pt and is aware Pt is requesting [facility]  Referral sent to [facility] via [facility]  Await determination  Will follow

## 2018-08-07 NOTE — PLAN OF CARE
Problem: PHYSICAL THERAPY ADULT  Goal: Performs mobility at highest level of function for planned discharge setting  See evaluation for individualized goals  Treatment/Interventions: Functional transfer training, LE strengthening/ROM, Elevations, Therapeutic exercise, Endurance training, Equipment eval/education, Gait training, Spoke to nursing  Equipment Recommended: Michaela Jansen       See flowsheet documentation for full assessment, interventions and recommendations  Outcome: Progressing  Prognosis: Good  Problem List: Decreased strength, Decreased endurance, Impaired balance, Decreased mobility, Pain  Assessment: Pt presents w/ recent onset of (R) knee pain and swelling and now s/p aspiration and injection by ortho earlier in AM; pt currently remains to be generally weak and deconditioned w/ decreased functional endurance and ongoing (R) knee discomfort during mobilization; pt also expressed general fatigue post amb trial and required a few min to recover; remained in NAD  Overall, current functional mobility status and endurance appear to be below premorbid level --> pt would benefit from rehab upon D/C at this time to regain premorbid level of (I); pt informed; MD notified; will cont to follow however and make appropriate changes to D/C recommendations as needed based on progress  Recommendation: (S) Post acute IP rehab (change from prior recommendation)          See flowsheet documentation for full assessment

## 2018-08-07 NOTE — PROGRESS NOTES
Progress Note - Godfrey Chamberlain 80 y o  female MRN: [id number]  Unit/Bed#: [address] [place] [address] 204-01 Encounter: [id number]    Assessment:  Principal Problem:    Gastric ulcer with hemorrhage  Active Problems:    GERD without esophagitis    Hyperlipidemia    Essential hypertension    Malignant neoplasm of upper-outer quadrant of right breast in female, estrogen receptor positive ([address] )    Acute blood loss anemia  Resolved Problems:    GI bleed      Plan:  · Upper GI bleed/gastric ulcer/acute blood loss anemia  H&H is stable  Continue Protonix  GI follow-up noted  On MiraLax and Colace  · Right knee pain secondary to osteoarthritis  Ortho consult appreciated  Patient had arthrocentesis done today  Awaiting fluid analysis,crystal and culture  Patient is complaining of pain in her medial side of the right knee  Pain management  Will request physical therapy reassessment today  · Hypertension-on Lopressor  · GI/DVT prophylaxis  · Discussed with patient in detail  Subjective:   Patient is seen and examined at bedside  Complained of having some loose stool  Will discontinue MiraLax  Right knee pain is improved  Status post arthrocentesis today  Had a T-max of 101° yesterday  All other ROS are negative  Objective:   Vitals: Blood pressure 164/72, pulse 83, temperature 97 8 °F (36 6 °C), temperature source Oral, resp  rate 20, height 5' (1 524 m), weight 52 kg (114 lb 10 2 oz), SpO2 90 %  ,Body mass index is 22 39 kg/m²  SPO2 RA Rest      ED to Hosp-Admission (Current) from 8/1/2018 in [facility] Unit   SpO2  90 %   SpO2 Activity  At Rest   O2 Device  None (Room air)   O2 Flow Rate  94 L/min        I&O: No intake or output data in the 24 hours ending 08/07/18 1314    Physical Exam:    General- Alert, lying comfortably in bed  Not in any acute distress  HEENT- BOBY, EOM intact    Neck- Supple, No JVD  CVS- regular, S1 and S2 normal   Chest- Bilateral Air entry, No rhochi, crackles or wheezing present  Abdomen- soft, nontender, not distended, no guarding or rigidity, BS+  Extremities-  No pedal edema, No calf tenderness  Right knee-swelling is improved  CNS-   Alert, awake and orientedx3  No focal deficits present      Invasive Devices     Peripheral Intravenous Line            Peripheral IV 08/06/18 Left Arm 1 day                      Social History  reviewed  Family History   Problem Relation Age of Onset    Heart disease Mother     Heart disease Father     reviewed    Meds:  Current Facility-Administered Medications   Medication Dose Route Frequency Provider Last Rate Last Dose    acetaminophen (TYLENOL) tablet 650 mg  650 mg Oral Q6H PRN Maulik Chou MD   650 mg at 08/06/18 1825    bisacodyl (DULCOLAX) rectal suppository 10 mg  10 mg Rectal Daily PRN JAZMIN Issa        docusate sodium (COLACE) capsule 100 mg  100 mg Oral BID JAZMIN Issa   100 mg at 08/07/18 0818    hydrALAZINE (APRESOLINE) injection 10 mg  10 mg Intravenous Q6H PRN JAZMIN Cobb   10 mg at 08/04/18 0233    iodixanol (VISIPAQUE) 320 MG/ML injection 100 mL  100 mL Intravenous Once in imaging Louvella Citrin, DO        LORazepam (ATIVAN) tablet 0 5 mg  0 5 mg Oral Q8H PRN Jamel Bragg MD   0 5 mg at 08/06/18 1620    losartan (COZAAR) tablet 50 mg  50 mg Oral Daily Jamel Bragg MD   50 mg at 08/07/18 0818    metoprolol tartrate (LOPRESSOR) tablet 50 mg  50 mg Oral BID Jamel Bragg MD   50 mg at 08/07/18 0818    ondansetron (ZOFRAN) injection 4 mg  4 mg Intravenous Once Jamel Bragg MD        pantoprazole (PROTONIX) EC tablet 40 mg  40 mg Oral BID AC Jamel Bragg MD   40 mg at 08/07/18 0537    polyethylene glycol (MIRALAX) packet 17 g  17 g Oral Daily JAZMIN Saleem   17 g at 08/07/18 0818    pravastatin (PRAVACHOL) tablet 20 mg  20 mg Oral Daily With Pauline Easton MD   20 mg at 08/06/18 1756      Facility-Administered Medications Prior to Admission   Medication    aluminum-magnesium hydroxide-simethicone (MYLANTA) 200-200-20 mg/5 mL oral suspension 30 mL    lidocaine viscous (XYLOCAINE) 2 % mucosal solution 15 mL     Prescriptions Prior to Admission   Medication    aspirin 81 mg chewable tablet    aspirin 81 MG tablet    calcium citrate-vitamin D (CITRACAL+D) 315-200 MG-UNIT per tablet    famotidine (PEPCID) 20 mg tablet    hydrOXYzine HCL (ATARAX) 25 mg tablet    LORazepam (ATIVAN) 0 5 mg tablet    metoprolol tartrate (LOPRESSOR) 50 mg tablet    Multiple Vitamins-Minerals (CENTRUM SILVER ADULT 50+ PO)    Multiple Vitamins-Minerals (CENTRUM SILVER ULTRA WOMENS) TABS    Omega-3 Fatty Acids (FISH OIL) 645 MG CAPS    pravastatin (PRAVACHOL) 20 mg tablet       Labs:    Results from last 7 days  Lab Units 08/07/18  0537 08/06/18  0447 08/05/18  0432   WBC Thousand/uL 9 42 12 08* 7 90   HEMOGLOBIN g/dL 8 6* 9 1* 8 6*   HEMATOCRIT % 26 2* 28 4* 26 4*   PLATELETS Thousands/uL 192 217 197   NEUTROS PCT % 66 69 56   LYMPHS PCT % 15 15 22   MONOS PCT % 15* 13* 15*   EOS PCT % 2 2 5       Results from last 7 days  Lab Units 08/07/18  0537 08/05/18  0432 08/02/18  0502 08/01/18  0959   SODIUM mmol/L 138 139 140 138   POTASSIUM mmol/L 4 1 4 0 4 4 4 6   CHLORIDE mmol/L 104 105 107 100   CO2 mmol/L 26 26 24 31   BUN mg/dL 16 12 28* 43*   CREATININE mg/dL 1 12 1 04 0 87 1 10   CALCIUM mg/dL 8 2* 8 5 8 4 8 8   TOTAL PROTEIN g/dL  --   --   --  6 9   BILIRUBIN TOTAL mg/dL  --   --   --  0 40   ALK PHOS U/L  --   --   --  44*   ALT U/L  --   --   --  15   AST U/L  --   --   --  36   GLUCOSE RANDOM mg/dL 102 98 78 155*     Lab Results   Component Value Date    CKTOTAL 46 07/13/2015       Results from last 7 days  Lab Units 08/01/18  0959   INR  1 11     Lab Results   Component Value Date    URINECX 10,000-19,000 cfu/ml  02/10/2018         Imaging:  No results found for this or any previous visit    Results for orders placed during the hospital encounter of 02/03/17   XR chest 2 views Narrative CHEST     INDICATION:  Evaluate for foreign body  COMPARISON:  1/23/2015 chest radiograph    VIEWS:  Frontal and lateral projections; 2 images    FINDINGS:  There is a left-sided pacer generator device with intact leads  Heart shadow appears unremarkable  Atherosclerotic vascular calcifications are noted  The lungs are clear  No pneumothorax or pleural effusion  Stable right apical scarring  Visualized osseous structures appear within normal limits for the patient's age  There are surgical clips in the right axilla  Impression No active pulmonary disease  Workstation performed: [id number]         Labs & Imaging: I have personally reviewed pertinent reports        VTE Pharmacologic Prophylaxis: Reason for no pharmacologic prophylaxis GI bleed  VTE Mechanical Prophylaxis: sequential compression device    Code Status:   Level 3 - DNAR and DNI      "This note has been constructed using a voice recognition system"      Yina Rojas MD  8/7/2018,1:14 PM

## 2018-08-07 NOTE — PROCEDURES
Procedure- Orthopedics   Amaya Mayorga 80 y o  female MRN: [id number]  Unit/Bed#: 2 [facility] 204-01    Procedure: right knee aspiration and injection    After sterile preparation of the skin overlying the knee local anesthetic was provided with 5cc of 1% lidocaine  An 18 gauge needle was then  inserted via a superior lateral portal   Approx 30 cc of yellow, cloudy fluid was aspirated  The syringe was then exchanged and a mixture of 5 cc 1% lidocaine and 1 cc depomedrol was injected into the knee joint  Sterile dressing was then applied  Pt tolerated the procedure well and was neurovascularly intact both pre and post procedure  Fluid was sent for cell count/differential and crystals      Amos Rosales PA-C

## 2018-08-07 NOTE — CONSULTS
Consultation - Orthopedics   Lamonte Spencer 80 y o  female MRN: [id number]  Unit/Bed#: [address] [place] [address] 204-01 Encounter: [id number]      Assessment/Plan     Assessment:  Right knee osteoarthritis, effusion  ITB tightness    Plan:  1  Recommend aspiration/cortisone injection to right knee joint  See procedure note  Fluid sent for cell count with differential and crystals due to cloudy appearance  2  Ice to right knee  3  Encouraged knee ROM while in bed to prevent stiffness  4  Continue Tylenol as needed for pain  NSAIDS contraindicated due to GI bleed  5  Follow up in office as outpatient as needed in 4-6 weeks if continues to have pain  6  PT for ITB stretching and gait training  History of Present Illness   Physician Requesting Consult: Anika Costa MD  Reason for Consult / Principal Problem: right knee pain  HPI: Lamonte Spencer is a 80y o  year old female who presents with right knee pain that began yesterday  She denies any injury  She states she started to feel the pain when ambulating outside of her room  The pain is localized over the medial aspect of her knee  She has some pain when moving the knee in bed as well  She feels stiffness in her knee  She denies any history of gout or any prior issues with that knee  She was admitted to the hospital about a week ago for a GI bleed  She is unable to take NSAIDs  Inpatient consult to Orthopedic Surgery  Consult performed by: Micaela Jimenez  Consult ordered by: Greg Bey          Review of Systems   Constitutional: Negative  HENT: Negative  Eyes: Negative  Respiratory: Negative  Cardiovascular: Negative  Gastrointestinal: Positive for blood in stool  Endocrine: Negative  Genitourinary: Negative  Musculoskeletal: Positive for arthralgias and joint swelling  Skin: Negative  Allergic/Immunologic: Negative  Neurological: Negative  Hematological: Negative  Psychiatric/Behavioral: Negative          Historical Information Past Medical History:   Diagnosis Date    Breast cancer ([facility] )     last assessed 11/19/17     Cardiac pacemaker     GERD (gastroesophageal reflux disease)     Hearing loss     Heart block     S/P pacemaker     Herpes zoster     last assessed 7/30/13    Hx of radiation therapy 2010    last assessed 11/19/17     Hyperlipidemia     Hypertension     Insomnia     last assessed 7/30/13, resolved 6/16/15    Lyme disease     last assessed 7/1/13    Malignant neoplasm of cervix ([facility] )     last assessed 6/27/12, resolved 2/1/17     Osteoporosis     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 ([facility] )     last assessed 6/27/12    Use of anastrozole (Arimidex)     took x 5yrs, last assessed 11/19/17      Past Surgical History:   Procedure Laterality Date    BREAST BIOPSY  10/05/2009    percutaneous needle core    BREAST SURGERY Right 11/13/2009    Lumpectomy     CARDIAC PACEMAKER PLACEMENT      dual chamber, last assessed 2/26/16    CATARACT EXTRACTION, BILATERAL      DENTAL SURGERY      ESOPHAGOGASTRODUODENOSCOPY N/A 2/3/2017    Procedure: ESOPHAGOGASTRODUODENOSCOPY (EGD): FOREIGN BODY REMOVAL;  Surgeon: Laura Hernandez MD;  Location:  MAIN OR;  Service:     ESOPHAGOGASTRODUODENOSCOPY N/A 8/2/2018    Procedure: ESOPHAGOGASTRODUODENOSCOPY (EGD); Surgeon: Laura Hernandez MD;  Location:  MAIN OR;  Service: Gastroenterology    HERNIA REPAIR Right 01/28/2015    Inguinal, with mesh    HYSTERECTOMY      KNEE SURGERY      resolved 1999    LYMPHADENECTOMY  11/13/2009    Axillary     SENTINEL LYMPH NODE BIOPSY Right 11/13/2009     Social History   History   Alcohol Use No     History   Drug Use No     History   Smoking Status    Former Smoker   Smokeless Tobacco    Never Used     Family History: non-contributory    Meds/Allergies   all current active meds have been reviewed  No Known Allergies    Objective   Vitals: Blood pressure 164/72, pulse 83, temperature 97 8 °F (36 6 °C), temperature source Oral, resp   rate 20, height 5' (1 524 m), weight 52 kg (114 lb 10 2 oz), SpO2 90 %  ,Body mass index is 22 39 kg/m²  Intake/Output Summary (Last 24 hours) at 08/07/18 0728  Last data filed at 08/06/18 1200   Gross per 24 hour   Intake              120 ml   Output              350 ml   Net             -230 ml     I/O last 24 hours: In: 120 [P O :120]  Out: 350 [Urine:350]    Invasive Devices     Peripheral Intravenous Line            Peripheral IV 08/06/18 Left Arm 1 day                Physical Exam   Constitutional: She is oriented to person, place, and time  She appears well-developed  HENT:   Head: Normocephalic and atraumatic  Eyes: EOM are normal  Pupils are equal, round, and reactive to light  Neck: Neck supple  Cardiovascular: Normal rate, regular rhythm and intact distal pulses  Pulmonary/Chest: Effort normal and breath sounds normal  No respiratory distress  Abdominal: Soft  There is no tenderness  Musculoskeletal: She exhibits tenderness  Right knee: She exhibits effusion (Grade 1)  Neurological: She is alert and oriented to person, place, and time  Skin: Skin is warm and dry  No erythema  Psychiatric: She has a normal mood and affect  Nursing note and vitals reviewed  Right Knee Exam     Tenderness   The patient is experiencing tenderness in the medial joint line  Range of Motion   The patient has normal right knee ROM  Right knee flexion: pain  Muscle Strength     The patient has normal right knee strength      Tests   Chelsea:  Medial - negative Lateral - negative  Lachman:  Anterior - negative      Drawer:       Anterior - negative    Posterior - negative  Varus: negative  Valgus: negative  Patellar Apprehension: negative    Other   Erythema: absent  Scars: absent  Sensation: normal  Pulse: present  Swelling: mild  Other tests: effusion (Grade 1) present    Comments:  Tenderness over ITB      Left Knee Exam   Left knee exam is normal             Lab Results: I have personally reviewed pertinent lab results  Imaging Studies: I have personally reviewed pertinent films in PACS  X-rays of the right knee reveal mild osteoarthritis  There is chondrocalcinosis in the medial and lateral compartments  Code Status: Level 3 - DNAR and DNI  Advance Directive and Living Will: Yes    Power of :    POLST:      Counseling / Coordination of Care  Total floor / unit time spent today 30 minutes  Greater than 50% of total time was spent with the patient and / or family counseling and / or coordination of care

## 2018-08-07 NOTE — PHYSICAL THERAPY NOTE
PHYSICAL THERAPY NOTE          Patient Name: Christiano Aceves  FWKVA'Z Date: 8/7/2018 08/07/18 1403   Pain Assessment   Pain Assessment 0-10   Pain Score 2   Pain Type Acute pain   Pain Location Knee   Pain Orientation Right   Pain Descriptors Aching;Discomfort   Pain Frequency Intermittent   Pain Onset Ongoing   Clinical Progression Gradually improving   Effect of Pain on Daily Activities discomfort w/ knee mvt and amb   Patient's Stated Pain Goal No pain   Hospital Pain Intervention(s) Repositioned; Ambulation/increased activity; Distraction; Emotional support   Response to Interventions appears to be comfortable at the end of session   Restrictions/Precautions   Other Precautions Fall Risk;Pain;Hard of hearing   General   Chart Reviewed Yes   Additional Pertinent History During the course, pt developed pain and swelling (R) knee; ortho consulted; imp: (R) knee OA, effusion; ITB tightness; pt underwent right knee aspiration and injection earlier in AM; PT to follow for re-assessment of mobility as per MD request (spoke to Dr Trino Dumont)  Response to Previous Treatment Patient with no complaints from previous session  Family/Caregiver Present (niece arrived at the end of session)   Cognition   Overall Cognitive Status [facility]   Arousal/Participation Alert; Cooperative   Attention Attends with cues to redirect   Orientation Level Oriented to person;Oriented to place;Oriented to situation   Memory Decreased recall of recent events   Following Commands Follows one step commands with increased time or repetition  ([place])   Subjective   Subjective Pt is observed in bed; reports her knee feels better; agreeable to perform therex and mobilize;   Bed Mobility   Supine to Sit 5  Supervision   Additional items Assist x 1;Verbal cues; Increased time required;HOB elevated   Transfers   Sit to Stand 5  Supervision  (2 trials; reviewed hands placement w/ rw)   Additional items Assist x 1;Verbal cues   Stand to Sit 4  Minimal assistance   Additional items Assist x 1;Verbal cues  (2 trials; reviewed hands placement; decr eccentric control)   Additional Comments (B) SCDs placed back on and activated at the end of session; call bell w/in reach   Ambulation/Elevation   Gait pattern Excessively slow; Short stride; Inconsistent bernard   Gait Assistance 5  Supervision  (occasional close guarding; min (A) to manage rw when turning)   Additional items Assist x 1;Verbal cues; Tactile cues   Assistive Device Rolling walker   Distance 70 ft   Stair Management Assistance Not tested  (not appropriate at this time)   Balance   Static Sitting Fair   Static Standing Fair -  (supported)   Ambulatory Fair -  (w/ rw)   Activity Tolerance   Activity Tolerance Patient limited by fatigue;Patient limited by pain   Nurse Made Aware spoke to Ramone Smith RN   Exercises   Quad Sets Supine;10 reps;Right  (2 sets actively)   Heelslides Supine;10 reps;AAROM; Right  (hip/knee flex/ext; 2 sets)   Ankle Pumps Supine;10 reps;AROM; Right  (2 sets)   Assessment   Prognosis Good   Problem List Decreased strength;Decreased endurance; Impaired balance;Decreased mobility;Pain   Assessment Pt presents w/ recent onset of (R) knee pain and swelling and now s/p aspiration and injection by ortho earlier in AM; pt currently remains to be generally weak and deconditioned w/ decreased functional endurance and ongoing (R) knee discomfort during mobilization; pt also expressed general fatigue post amb trial and required a few min to recover; remained in NAD  Overall, current functional mobility status and endurance appear to be below premorbid level --> pt would benefit from rehab upon D/C at this time to regain premorbid level of (I); pt informed; MD notified; will cont to follow however and make appropriate changes to D/C recommendations as needed based on progress  Goals   Patient Goals to have less pain    to get stronger and walk    STG Expiration Date 08/13/18   Treatment Day 1   Plan   Treatment/Interventions Functional transfer training;LE strengthening/ROM; Elevations; Therapeutic exercise; Endurance training;Bed mobility;Gait training;Spoke to nursing;Spoke to MD   Progress Slow progress, decreased activity tolerance   PT Frequency 5x/wk   Recommendation   Recommendation Post acute IP rehab  (change from prior recommendation)   Equipment Recommended Carolyn Cordova, PT

## 2018-08-07 NOTE — PROGRESS NOTES
Ant Strickland  [id number]    81 y o   female      ASSESSMENT  1  Upper GI bleed/gastric ulcer/blood loss anemia - treated with PPI and EGD utilizing BICAP and injection   H pylori negative    bleeding appears to have stopped as no further melena and hemoglobin up to 8 6 g ms  2  Constipation - chronic    PLAN  1  Continue PPI  2  Follow H&H  3   Stable for discharge today from a GI perspective  4  Outpatient EGD in 6-8 weeks  5  Continue MiraLax/Colace  6  Dulcolax suppository if Colace MiraLax ineffective    Chief Complaint   Patient presents with    Black or Bloody Stool     pt presents to ED via EMS from home c/o of black loose clumpy stools  Bright red ring around the toilet of blood       SUBJECTIVE/HPI   Tolerating diet  Denies any nausea, vomiting, diarrhea, melena or rectal bleeding  Reports not having a bowel movement for several days      /72 (BP Location: Left arm)   Pulse 83   Temp 97 8 °F (36 6 °C) (Oral)   Resp 20   Ht 5' (1 524 m)   Wt 52 kg (114 lb 10 2 oz)   SpO2 90%   BMI 22 39 kg/m²       PHYSICALEXAM  Constitutional:  no acute distress, non-toxic appearance   Eyes:   conjunctiva pale  HENT:  Atraumatic  Respiratory:  No respiratory distress   Cardiovascular:  Normal rate  GI:  Soft, nondistended, normal bowel sounds, nontender  Musculoskeletal:  No edema  Neurologic:  Alert & oriented x 3  Psychiatric:  Speech and behavior appropriate       Lab Results   Component Value Date    GLUCOSE 102 08/07/2018    CALCIUM 8 2 (L) 08/07/2018     08/07/2018    K 4 1 08/07/2018    CO2 26 08/07/2018     08/07/2018    BUN 16 08/07/2018    CREATININE 1 12 08/07/2018     Lab Results   Component Value Date    WBC 9 42 08/07/2018    HGB 8 6 (L) 08/07/2018    HCT 26 2 (L) 08/07/2018    MCV 93 08/07/2018     08/07/2018     Lab Results   Component Value Date    ALT 15 08/01/2018    AST 36 08/01/2018    ALKPHOS 44 (L) 08/01/2018    BILITOT 0 40 08/01/2018     No results found for: AMYLASE  Lab Results   Component Value Date    LIPASE 350 08/01/2018     No results found for: IRON, TIBC, FERRITIN  Lab Results   Component Value Date    INR 1 11 08/01/2018       Counseling / Coordination of Care  Total floor / unit time spent today 25 minutes  Greater than 50% of total time was spent with the patient and / or family counseling and / or coordination of care  A description of the counseling / coordination of care: 15    Erby Liter

## 2018-08-08 VITALS
HEIGHT: 60 IN | RESPIRATION RATE: 18 BRPM | TEMPERATURE: 98 F | BODY MASS INDEX: 22.68 KG/M2 | SYSTOLIC BLOOD PRESSURE: 167 MMHG | DIASTOLIC BLOOD PRESSURE: 70 MMHG | OXYGEN SATURATION: 95 % | WEIGHT: 115.52 LBS | HEART RATE: 86 BPM

## 2018-08-08 PROBLEM — M11.261 PSEUDOGOUT OF RIGHT KNEE: Status: ACTIVE | Noted: 2018-08-08

## 2018-08-08 PROBLEM — K25.4 GASTRIC ULCER WITH HEMORRHAGE: Status: RESOLVED | Noted: 2018-08-03 | Resolved: 2018-08-08

## 2018-08-08 PROBLEM — D62 ACUTE BLOOD LOSS ANEMIA: Status: RESOLVED | Noted: 2018-08-01 | Resolved: 2018-08-08

## 2018-08-08 LAB
ANION GAP SERPL CALCULATED.3IONS-SCNC: 9 MMOL/L (ref 4–13)
BASOPHILS # BLD AUTO: 0.01 THOUSANDS/ΜL (ref 0–0.1)
BASOPHILS NFR BLD AUTO: 0 % (ref 0–1)
BUN SERPL-MCNC: 20 MG/DL (ref 5–25)
CALCIUM SERPL-MCNC: 8.8 MG/DL (ref 8.3–10.1)
CHLORIDE SERPL-SCNC: 103 MMOL/L (ref 100–108)
CO2 SERPL-SCNC: 24 MMOL/L (ref 21–32)
CREAT SERPL-MCNC: 0.93 MG/DL (ref 0.6–1.3)
EOSINOPHIL # BLD AUTO: 0 THOUSAND/ΜL (ref 0–0.61)
EOSINOPHIL NFR BLD AUTO: 0 % (ref 0–6)
ERYTHROCYTE [DISTWIDTH] IN BLOOD BY AUTOMATED COUNT: 14.9 % (ref 11.6–15.1)
GFR SERPL CREATININE-BSD FRML MDRD: 53 ML/MIN/1.73SQ M
GLUCOSE SERPL-MCNC: 148 MG/DL (ref 65–140)
HCT VFR BLD AUTO: 27.9 % (ref 34.8–46.1)
HGB BLD-MCNC: 8.9 G/DL (ref 11.5–15.4)
IMM GRANULOCYTES # BLD AUTO: 0.1 THOUSAND/UL (ref 0–0.2)
IMM GRANULOCYTES NFR BLD AUTO: 1 % (ref 0–2)
LYMPHOCYTES # BLD AUTO: 1.03 THOUSANDS/ΜL (ref 0.6–4.47)
LYMPHOCYTES NFR BLD AUTO: 9 % (ref 14–44)
MCH RBC QN AUTO: 29.6 PG (ref 26.8–34.3)
MCHC RBC AUTO-ENTMCNC: 31.9 G/DL (ref 31.4–37.4)
MCV RBC AUTO: 93 FL (ref 82–98)
MONOCYTES # BLD AUTO: 0.75 THOUSAND/ΜL (ref 0.17–1.22)
MONOCYTES NFR BLD AUTO: 7 % (ref 4–12)
NEUTROPHILS # BLD AUTO: 9.34 THOUSANDS/ΜL (ref 1.85–7.62)
NEUTS SEG NFR BLD AUTO: 83 % (ref 43–75)
NRBC BLD AUTO-RTO: 0 /100 WBCS
PLATELET # BLD AUTO: 240 THOUSANDS/UL (ref 149–390)
PMV BLD AUTO: 10.2 FL (ref 8.9–12.7)
POTASSIUM SERPL-SCNC: 4.1 MMOL/L (ref 3.5–5.3)
RBC # BLD AUTO: 3.01 MILLION/UL (ref 3.81–5.12)
SODIUM SERPL-SCNC: 136 MMOL/L (ref 136–145)
WBC # BLD AUTO: 11.23 THOUSAND/UL (ref 4.31–10.16)

## 2018-08-08 PROCEDURE — 85025 COMPLETE CBC W/AUTO DIFF WBC: CPT | Performed by: INTERNAL MEDICINE

## 2018-08-08 PROCEDURE — 80048 BASIC METABOLIC PNL TOTAL CA: CPT | Performed by: INTERNAL MEDICINE

## 2018-08-08 PROCEDURE — 99239 HOSP IP/OBS DSCHRG MGMT >30: CPT | Performed by: INTERNAL MEDICINE

## 2018-08-08 PROCEDURE — 99231 SBSQ HOSP IP/OBS SF/LOW 25: CPT | Performed by: PHYSICIAN ASSISTANT

## 2018-08-08 RX ORDER — LOSARTAN POTASSIUM 50 MG/1
50 TABLET ORAL DAILY
Qty: 30 TABLET | Refills: 0 | Status: SHIPPED | OUTPATIENT
Start: 2018-08-09 | End: 2018-09-01 | Stop reason: SDUPTHER

## 2018-08-08 RX ORDER — LANOLIN ALCOHOL/MO/W.PET/CERES
3 CREAM (GRAM) TOPICAL
Status: DISCONTINUED | OUTPATIENT
Start: 2018-08-08 | End: 2018-08-08 | Stop reason: HOSPADM

## 2018-08-08 RX ORDER — LORAZEPAM 0.5 MG/1
0.5 TABLET ORAL EVERY 6 HOURS PRN
Qty: 6 TABLET | Refills: 0 | Status: SHIPPED | OUTPATIENT
Start: 2018-08-08 | End: 2019-11-11

## 2018-08-08 RX ORDER — PANTOPRAZOLE SODIUM 40 MG/1
40 TABLET, DELAYED RELEASE ORAL
Qty: 60 TABLET | Refills: 0 | Status: SHIPPED | OUTPATIENT
Start: 2018-08-08 | End: 2018-09-01 | Stop reason: SDUPTHER

## 2018-08-08 RX ADMIN — PANTOPRAZOLE SODIUM 40 MG: 40 TABLET, DELAYED RELEASE ORAL at 06:01

## 2018-08-08 RX ADMIN — METOPROLOL TARTRATE 50 MG: 50 TABLET ORAL at 17:08

## 2018-08-08 RX ADMIN — METOPROLOL TARTRATE 50 MG: 50 TABLET ORAL at 08:22

## 2018-08-08 RX ADMIN — DOCUSATE SODIUM 100 MG: 100 CAPSULE, LIQUID FILLED ORAL at 08:22

## 2018-08-08 RX ADMIN — LOSARTAN POTASSIUM 50 MG: 50 TABLET ORAL at 08:22

## 2018-08-08 RX ADMIN — PRAVASTATIN SODIUM 20 MG: 20 TABLET ORAL at 17:08

## 2018-08-08 RX ADMIN — HYDRALAZINE HYDROCHLORIDE 10 MG: 20 INJECTION INTRAMUSCULAR; INTRAVENOUS at 00:20

## 2018-08-08 RX ADMIN — DOCUSATE SODIUM 100 MG: 100 CAPSULE, LIQUID FILLED ORAL at 17:08

## 2018-08-08 RX ADMIN — MELATONIN 3 MG: 3 TAB ORAL at 00:19

## 2018-08-08 RX ADMIN — PANTOPRAZOLE SODIUM 40 MG: 40 TABLET, DELAYED RELEASE ORAL at 17:08

## 2018-08-08 NOTE — SOCIAL WORK
Continue to follow  Spoke with Ruth at [facility], facility can accept Pt today  Ruth inquired about   Pt's chemo shots at [facility]  Spoke with Pt and Pt's dtr(Claudette: [phone]) aware [facility] has bed available for Pt today  Spoke with Pt's dtr re: chemo shots at the [facility] center  Pt's dtr informed  that she believes they told her to reschedule the chemo shots for after SNF  Call made to [facility] spoke with Nelia, informed Nelia that Pt will be going to SNF today  Nelia informed  that Pt's appointment was for 8/15/18 but the dtr can call next week to reschedule appointment to a later date  Pt's dtr informed of  conversation with Nelia at [facility] and for Pt's dtr to call to reschedule appointment  Pt's dtr in agreement  Spoke with Yokasta Bar at [facility], transportation arranged via wheelchair Sharon irma to [facility]  Pickup is 6:45pm  Pt and Pt's dtr aware of transport time and wheelchair [employer]  Spoke with Ruth at [facility], informed of transport time and  conversation with Nelia at [facility] and dtr will call next week to reschedule appointment from 8/15/18 to a later date  All in agreement

## 2018-08-08 NOTE — DISCHARGE SUMMARY
Discharge Summary - Brian Amezquita 80 y o  female MRN: [id number]  Unit/Bed#: [address] [place], [place] [address] Encounter: [id number]    Admission Date:    8/1/2018   Discharge Date:   08/08/18   Admitting Diagnosis:   Gastritis [K29 70]  Bloody stool [K92 1]  GI bleed [K92 2]  Admitting Provider:   Liz Howell MD  Discharge Provider:   Ivonne Galindo MD     Primary Care Physician at Discharge:   CHERYL Cruz,[phone]    HPI:   80 year-old female who presented to emergency department with melena  For a detailed HPI please refer to the admission note  Procedures Performed:   Orders Placed This Encounter   Procedures    Arthrocentesis       Hospital Course:   Patient was admitted with GI bleed  She was started on Protonix drip  Serial H&H was done  Patient was seen by Gastroenterology and endoscopy was done  Endoscopy showed a ulcer in the fundus  Moderately severe chronic gastritis  GI recommended to avoid all NSAIDs and no aspirin  Repeat EGD in 6-8 weeks  Patient was monitored for any further bleeding  She did not have any signs of GI bleed  She tolerated the diet well  Her hemoglobin remained stable  Patient was given IV iron  Patient complained of right knee pain and was seen by Orthopedics  Arthrocentesis was done which came back positive for pseudogout  Patient was given cortisone injection by the Orthopedics  Patient was cleared by Orthopedics in GI for discharge  Patient was seen by Physical therapy and was recommended skilled nursing rehab  Patient was accepted at [facility] for skilled nursing rehab  Follow-up with PCP in 1 week  Follow-up with the Orthopedics as outpatient  Follow-up with the GI and Repeat EGD in 6-8 weeks  Return to ER with any rectal bleeding, melena, shortness of breath, chest pain or any other alarming symptoms      Consulting Providers   Gastroenterology and orthopedics    Complications:  None    Labs:   Lab Results   Component Value Date WBC 11 23 (H) 08/08/2018    WBC 7 02 07/13/2015    RBC 3 01 (L) 08/08/2018    RBC 4 82 07/13/2015    HGB 8 9 (L) 08/08/2018    HGB 14 2 07/13/2015    HCT 27 9 (L) 08/08/2018    HCT 43 4 07/13/2015    MCV 93 08/08/2018    MCV 90 07/13/2015    MCH 29 6 08/08/2018    MCH 29 5 07/13/2015    RDW 14 9 08/08/2018    RDW 14 1 07/13/2015     08/08/2018     07/13/2015     Lab Results   Component Value Date    CREATININE 0 93 08/08/2018    CREATININE 1 13 07/13/2015    BUN 20 08/08/2018    BUN 22 07/13/2015     08/08/2018     07/13/2015    K 4 1 08/08/2018    K 4 2 07/13/2015     08/08/2018     07/13/2015    CO2 24 08/08/2018    CO2 30 07/13/2015    GLUCOSE 148 (H) 08/08/2018    GLUCOSE 96 07/13/2015    PROT 6 9 08/01/2018    PROT 7 7 07/13/2015    ALKPHOS 44 (L) 08/01/2018    ALKPHOS 42 (L) 07/13/2015    ALT 15 08/01/2018    ALT 14 07/13/2015    AST 36 08/01/2018    AST 17 07/13/2015       Treatments:  IV hydration and Protonix drip, Cozaar, Lopressor and Pravachol    Discharge Diagnosis:   Principal Problem:    Gastric ulcer with hemorrhage  Active Problems:    GERD without esophagitis    Hyperlipidemia    Essential hypertension    Malignant neoplasm of upper-outer quadrant of right breast in female, estrogen receptor positive (HCC)    Acute blood loss anemia    Pseudogout of right knee  Resolved Problems:    GI bleed      Condition at Discharge:   Good     Code Status: Level 3 - DNAR and DNI  Advance Directive and Living Will: Received  Power of :    POLST:      Discharge instructions/Information to patient and family:   See after visit summary for information provided to patient and family  Provisions for Follow-Up Care:  See after visit summary for information related to follow-up care and any pertinent home health orders  Disposition:   Skilled nursing facility at [facility]      Planned Readmission:   No    Discharge Statement   I spent 35 minutes discharging the patient  This time was spent on the day of discharge  I had direct contact with the patient on the day of discharge  Greater than 50% of the total time was spent examining patient, answering all patient questions, arranging and discussing plan of care with patient as well as directly providing post-discharge instructions  Additional time then spent on discharge activities  Discharge Medications:  See after visit summary for reconciled discharge medications provided to patient and family        "This note has been constructed using a voice recognition system"    Mack Martin MD  8/8/2018,2:03 PM

## 2018-08-08 NOTE — DISCHARGE INSTRUCTIONS
Orthopedics:  -WBAT to RLE   -Ice to right knee  -Encourage ROM and low impact exercises  -Follow up in office with Dr Solis Quan in 4-6 weeks if pain returns  Follow-up with PCP in 1 week  Follow-up with the Orthopedics as outpatient  Follow-up with the GI and Repeat EGD in 6-8 weeks  Return to ER with any rectal bleeding, melena, shortness of breath, chest pain or any other alarming symptoms

## 2018-08-08 NOTE — PROGRESS NOTES
Orthopedics   Mehul Price 80 y o  female MRN: [id number]  Unit/Bed#: [address] [place] [address]      Subjective:  80 y  o female with right knee pain  Right knee joint was aspirated yesterday and injected with cortisone  Fluid was sent for analysis and revealed pseudogout  Patient feels significant relief after aspiration/injection  Has no pain or tightness with ROM  Sitting up comfortably in a chair      Labs:    0  Lab Value Date/Time   HCT 27 9 (L) 08/08/2018 0508   HCT 26 2 (L) 08/07/2018 0537   HCT 28 4 (L) 08/06/2018 0447   HCT 43 4 07/13/2015 0744   HCT 43 5 01/23/2015 0945   HCT 41 1 08/26/2014 0914   HGB 8 9 (L) 08/08/2018 0508   HGB 8 6 (L) 08/07/2018 0537   HGB 9 1 (L) 08/06/2018 0447   HGB 14 2 07/13/2015 0744   HGB 14 1 01/23/2015 0945   HGB 13 6 08/26/2014 0914   INR 1 11 08/01/2018 0959   WBC 11 23 (H) 08/08/2018 0508   WBC 9 42 08/07/2018 0537   WBC 12 08 (H) 08/06/2018 0447   WBC 7 02 07/13/2015 0744   WBC 6 94 01/23/2015 0945   WBC 6 66 08/26/2014 0914       Meds:    Current Facility-Administered Medications:     acetaminophen (TYLENOL) tablet 650 mg, 650 mg, Oral, Q6H PRN, Sahara Collins MD, 650 mg at 08/06/18 1825    bisacodyl (DULCOLAX) rectal suppository 10 mg, 10 mg, Rectal, Daily PRN, JAZMIN Starr    docusate sodium (COLACE) capsule 100 mg, 100 mg, Oral, BID, JAZMIN Saleem, 100 mg at 08/08/18 7833    hydrALAZINE (APRESOLINE) injection 10 mg, 10 mg, Intravenous, Q6H PRN, Fernanda Cough, CRNP, 10 mg at 08/08/18 0020    iodixanol (VISIPAQUE) 320 MG/ML injection 100 mL, 100 mL, Intravenous, Once in imaging, Ruchi Moe, DO    LORazepam (ATIVAN) tablet 0 5 mg, 0 5 mg, Oral, Q8H PRN, Joanie Aquino MD, 0 5 mg at 08/06/18 1620    losartan (COZAAR) tablet 50 mg, 50 mg, Oral, Daily, Joanie Aquino MD, 50 mg at 08/08/18 8525    melatonin tablet 3 mg, 3 mg, Oral, HS PRN, Fernanda Cough, CRNP, 3 mg at 08/08/18 0019    metoprolol tartrate (LOPRESSOR) tablet 50 mg, 50 mg, Oral, BID, Benjamin Southfield Adeline Parikh MD, 50 mg at 08/08/18 6643    ondansetron [facility] injection 4 mg, 4 mg, Intravenous, Once, Yulia Rizo MD    pantoprazole (PROTONIX) EC tablet 40 mg, 40 mg, Oral, BID AC, Yulia Rizo MD, 40 mg at 08/08/18 0601    pravastatin (PRAVACHOL) tablet 20 mg, 20 mg, Oral, Daily With Uriel Posey MD, 20 mg at 08/07/18 1717      Ins and Outs:  I/O last 24 hours: In: -   Out: 250 [Urine:250]      Right knee synovial fluid:  Calcium pyrophosphate crystals    Physical:  Vitals:    08/08/18 0700   BP: 152/64   Pulse: 95   Resp: 18   Temp: 97 6 °F (36 4 °C)   SpO2: 94%     right knee:  · FROM with no pain  · nontender to palpation  · No effusion  · Calf soft, nontender    _*_*_*_*_*_*_*_*_*_*_*_*_*_*_*_*_*_*_*_*_*_*_*_*_*_*_*_*_*_*_*_*_*_*_*_*_*_*_*_*_*    Assessment: 80 y  o female with right knee pseudogout  Improved after aspiration/cortisone injection  Plan:  · Weight Bearing as tolerated to RLE  · Up and out of bed  · DVT prophylaxis  · Ice to right knee as needed  · PT/OT  · Ok to discharge today from ortho standpoint  Follow up in office as needed as outpatient if pain/swelling returns      Talia Chiu PA-C

## 2018-08-13 ENCOUNTER — TELEPHONE (OUTPATIENT)
Dept: HEMATOLOGY ONCOLOGY | Facility: CLINIC | Age: 83
End: 2018-08-13

## 2018-08-13 NOTE — TELEPHONE ENCOUNTER
Pt's daughter Claudette called  Pt in Son home and questioning if needs to keep infusion [facility]  Monica Morrow RN will take the call

## 2018-08-20 ENCOUNTER — TRANSITIONAL CARE MANAGEMENT (OUTPATIENT)
Dept: FAMILY MEDICINE CLINIC | Facility: HOSPITAL | Age: 83
End: 2018-08-20

## 2018-08-20 ENCOUNTER — TELEPHONE (OUTPATIENT)
Dept: FAMILY MEDICINE CLINIC | Facility: HOSPITAL | Age: 83
End: 2018-08-20

## 2018-08-27 ENCOUNTER — TELEPHONE (OUTPATIENT)
Dept: FAMILY MEDICINE CLINIC | Facility: HOSPITAL | Age: 83
End: 2018-08-27

## 2018-08-28 RX ORDER — LAMOTRIGINE 25 MG/1
250 TABLET ORAL ONCE
Status: COMPLETED | OUTPATIENT
Start: 2018-08-29 | End: 2018-08-29

## 2018-08-28 NOTE — TELEPHONE ENCOUNTER
Please give Jessie Schmid from [facility] a verbal OK for PT for strength and gait training

## 2018-08-29 ENCOUNTER — HOSPITAL ENCOUNTER (OUTPATIENT)
Dept: INFUSION CENTER | Facility: HOSPITAL | Age: 83
Discharge: HOME | End: 2018-08-29
Payer: MEDICARE

## 2018-08-29 ENCOUNTER — REMOTE DEVICE CLINIC VISIT (OUTPATIENT)
Dept: CARDIOLOGY CLINIC | Facility: CLINIC | Age: 83
End: 2018-08-29
Payer: MEDICARE

## 2018-08-29 VITALS
SYSTOLIC BLOOD PRESSURE: 159 MMHG | RESPIRATION RATE: 18 BRPM | TEMPERATURE: 97.5 F | HEART RATE: 72 BPM | DIASTOLIC BLOOD PRESSURE: 75 MMHG | OXYGEN SATURATION: 97 %

## 2018-08-29 DIAGNOSIS — Z95.0 CARDIAC PACEMAKER: ICD-10-CM

## 2018-08-29 DIAGNOSIS — I44.2 AV BLOCK, COMPLETE (HCC): Primary | ICD-10-CM

## 2018-08-29 PROCEDURE — 96402 CHEMO HORMON ANTINEOPL SQ/IM: CPT

## 2018-08-29 PROCEDURE — 93296 REM INTERROG EVL PM/IDS: CPT | Performed by: INTERNAL MEDICINE

## 2018-08-29 PROCEDURE — 93294 REM INTERROG EVL PM/LDLS PM: CPT | Performed by: INTERNAL MEDICINE

## 2018-08-29 RX ADMIN — FULVESTRANT 250 MG: 50 INJECTION INTRAMUSCULAR at 13:55

## 2018-08-29 NOTE — PROGRESS NOTES
MDT DUAL CHAMBER PACEMAKER  CARELINK TRANSMISSION: BATTERY ADEQUATE (10 5 YRS)  AP 69%,  100% (DEPENDENT/CHB)  ALL AVAILABLE LEAD PARAMETERS WITHIN NORMAL LIMITS  NO SIGNIFICANT HIGH RATE EPISODES   NORMAL DEVICE FUNCTION    EB

## 2018-09-01 ENCOUNTER — OFFICE VISIT (OUTPATIENT)
Dept: FAMILY MEDICINE CLINIC | Facility: HOSPITAL | Age: 83
End: 2018-09-01
Payer: MEDICARE

## 2018-09-01 VITALS
SYSTOLIC BLOOD PRESSURE: 112 MMHG | BODY MASS INDEX: 21.2 KG/M2 | WEIGHT: 108 LBS | HEART RATE: 68 BPM | TEMPERATURE: 96.5 F | HEIGHT: 60 IN | DIASTOLIC BLOOD PRESSURE: 60 MMHG

## 2018-09-01 DIAGNOSIS — K21.00 GASTROESOPHAGEAL REFLUX DISEASE WITH ESOPHAGITIS: ICD-10-CM

## 2018-09-01 DIAGNOSIS — C50.411 MALIGNANT NEOPLASM OF UPPER-OUTER QUADRANT OF RIGHT BREAST IN FEMALE, ESTROGEN RECEPTOR POSITIVE (HCC): ICD-10-CM

## 2018-09-01 DIAGNOSIS — Z17.0 MALIGNANT NEOPLASM OF UPPER-OUTER QUADRANT OF RIGHT BREAST IN FEMALE, ESTROGEN RECEPTOR POSITIVE (HCC): ICD-10-CM

## 2018-09-01 DIAGNOSIS — C79.2 CANCER, METASTATIC TO SKIN (HCC): ICD-10-CM

## 2018-09-01 DIAGNOSIS — I10 ESSENTIAL HYPERTENSION: ICD-10-CM

## 2018-09-01 DIAGNOSIS — K25.4 GASTRIC ULCER WITH HEMORRHAGE, UNSPECIFIED CHRONICITY: Primary | ICD-10-CM

## 2018-09-01 PROCEDURE — 99495 TRANSJ CARE MGMT MOD F2F 14D: CPT | Performed by: FAMILY MEDICINE

## 2018-09-01 RX ORDER — LOSARTAN POTASSIUM 50 MG/1
50 TABLET ORAL DAILY
Qty: 90 TABLET | Refills: 3 | Status: SHIPPED | OUTPATIENT
Start: 2018-09-01 | End: 2018-09-04 | Stop reason: SDUPTHER

## 2018-09-01 RX ORDER — PANTOPRAZOLE SODIUM 40 MG/1
40 TABLET, DELAYED RELEASE ORAL
Qty: 180 TABLET | Refills: 3 | Status: SHIPPED | OUTPATIENT
Start: 2018-09-01 | End: 2018-12-03 | Stop reason: SDUPTHER

## 2018-09-01 RX ORDER — METOPROLOL TARTRATE 50 MG/1
50 TABLET, FILM COATED ORAL 2 TIMES DAILY
Qty: 180 TABLET | Refills: 3 | Status: SHIPPED | OUTPATIENT
Start: 2018-09-01 | End: 2018-12-03 | Stop reason: SDUPTHER

## 2018-09-01 NOTE — PROGRESS NOTES
Assessment/Plan:         Diagnoses and all orders for this visit:    Gastric ulcer with hemorrhage, unspecified chronicity  Comments:  Clinically stable with resolution of bleeding    Gastroesophageal reflux disease with esophagitis  -     pantoprazole (PROTONIX) 40 mg tablet; Take 1 tablet (40 mg total) by mouth 2 (two) times a day before meals    Essential hypertension  -     metoprolol tartrate (LOPRESSOR) 50 mg tablet; Take 1 tablet (50 mg total) by mouth 2 (two) times a day  -     losartan (COZAAR) 50 mg tablet; Take 1 tablet (50 mg total) by mouth daily    Malignant neoplasm of upper-outer quadrant of right breast in female, estrogen receptor positive (HCC)  -     Discontinue: losartan (COZAAR) 50 mg tablet; Take 1 tablet (50 mg total) by mouth daily    Cancer, metastatic to skin (HCC)          Subjective:      Patient ID: Otis Jackson is a 80 y o  female  TCM follow up for hospital and rehab stay for acute GI bleed    Has been tolerating Faslodex infusion for metastatic breast cancer with Dr Deidre Vasquez  Will be on every other week so far  Still having epigastric discomfort  Appetite is returning and is taking Ensure and Centrum Silver        Date and time hospital follow up call was made:  8/20/2018 10:32 AM  Date of admission:  8/1/18  Date of discharge:  8/19/18  Diagnosis:  GI Bleed  Disposition:  Home  Were the patients medicaitons reviewed and updated:  No  Current symptoms:  None, Weakness  Weakness severity:  Moderate  Post hospital issues:  None  Should patient be enrolled in anticoag monitoring?:  No  Scheduled for follow up?:  Yes  Did you obtain your prescribed medications:  No  Why were you unable to obtain your medication:  No changes  Do you need help managing your perscriptions or medications:  No  Is transportation to your appointments needed:  No  I have advised the patient to call PCP with any new or worsening symptoms (please type in name along with any credentials):  Ulises Freeman MA  Living Arrangements:  Family members, Children  Support System:  Family  Are you recieving outpatient services:  No  Are you recieving home care services:  Yes  Types of home care services:  Nurse visit  Are you using any community resources:  No  Current waiver service:  No  Have you fallen in the last 12 months:  No  Interperter language line required?:  No       The following portions of the patient's history were reviewed and updated as appropriate: allergies, current medications, past family history, past medical history, past social history, past surgical history and problem list     Review of Systems   Constitutional: Positive for fatigue and unexpected weight change  Negative for fever  HENT: Negative for congestion  Respiratory: Negative for cough and shortness of breath  Cardiovascular: Negative for chest pain, palpitations and leg swelling  Gastrointestinal: Positive for blood in stool  Negative for abdominal pain and constipation  Genitourinary: Negative for dysuria  Musculoskeletal: Positive for arthralgias and gait problem  Skin: Negative for rash  Neurological: Negative for dizziness and headaches  Hematological: Does not bruise/bleed easily  Psychiatric/Behavioral: The patient is nervous/anxious  All other systems reviewed and are negative  Objective:      /60 (Patient Position: Sitting, Cuff Size: Standard)   Pulse 68   Temp (!) 96 5 °F (35 8 °C) (Tympanic)   Ht 5' (1 524 m)   Wt 49 kg (108 lb)   BMI 21 09 kg/m²          Physical Exam   Constitutional: She is oriented to person, place, and time  She appears well-developed and well-nourished  Neck: Neck supple  Cardiovascular: Normal rate, regular rhythm and intact distal pulses  No murmur heard  Pulmonary/Chest: Effort normal and breath sounds normal    Abdominal: She exhibits no distension  Musculoskeletal: She exhibits no edema  Neurological: She is oriented to person, place, and time  Skin: No rash noted  Psychiatric: She has a normal mood and affect  Her behavior is normal  Judgment and thought content normal    Nursing note and vitals reviewed

## 2018-09-04 PROBLEM — K21.00 GASTROESOPHAGEAL REFLUX DISEASE WITH ESOPHAGITIS: Status: ACTIVE | Noted: 2018-09-04

## 2018-09-04 RX ORDER — LOSARTAN POTASSIUM 50 MG/1
50 TABLET ORAL DAILY
Qty: 90 TABLET | Refills: 0
Start: 2018-09-04 | End: 2018-12-03 | Stop reason: SDUPTHER

## 2018-09-06 ENCOUNTER — OFFICE VISIT (OUTPATIENT)
Dept: SURGICAL ONCOLOGY | Facility: HOSPITAL | Age: 83
End: 2018-09-06
Payer: MEDICARE

## 2018-09-06 VITALS
RESPIRATION RATE: 16 BRPM | HEART RATE: 97 BPM | DIASTOLIC BLOOD PRESSURE: 60 MMHG | WEIGHT: 107 LBS | HEIGHT: 60 IN | TEMPERATURE: 96.4 F | BODY MASS INDEX: 21.01 KG/M2 | SYSTOLIC BLOOD PRESSURE: 120 MMHG

## 2018-09-06 DIAGNOSIS — C50.411 MALIGNANT NEOPLASM OF UPPER-OUTER QUADRANT OF RIGHT BREAST IN FEMALE, ESTROGEN RECEPTOR POSITIVE (HCC): ICD-10-CM

## 2018-09-06 DIAGNOSIS — Z17.0 MALIGNANT NEOPLASM OF UPPER-OUTER QUADRANT OF RIGHT BREAST IN FEMALE, ESTROGEN RECEPTOR POSITIVE (HCC): ICD-10-CM

## 2018-09-06 DIAGNOSIS — C79.2 CANCER, METASTATIC TO SKIN (HCC): Primary | ICD-10-CM

## 2018-09-06 PROCEDURE — 99213 OFFICE O/P EST LOW 20 MIN: CPT | Performed by: SURGERY

## 2018-09-06 NOTE — PROGRESS NOTES
Surgical Oncology Follow Up       [place] [address]  [facility] SURGICAL ONCOLOGY Tatyana Blade  [place] [facility] [address]  8/10/1924  [id number]  [place] [address]  [facility] SURGICAL [address]  [place]  [address] [address] [address]    Chief Complaint   Patient presents with    Breast Cancer     Pt is here for 1 year follow up        Assessment/Plan   Diagnoses and all orders for this visit:    Cancer, metastatic to skin [facility])    Malignant neoplasm of upper-outer quadrant of right breast in female, estrogen receptor positive [facility])        Advance Care Planning/Advance Directives:  Did not discuss  with the patient  Oncology History:       Malignant neoplasm of upper-outer quadrant of right breast in female, estrogen receptor positive ([facility] [address] )    11/13/2009 Surgery     Right breast lumpectomy, SLNB, AND         2010 -  Radiation     WBRT,  [place]         8/31/2017 Initial Diagnosis     Malignant neoplasm of upper-outer quadrant of right breast in female, estrogen receptor positive ([facility] [address] )        Hormone Therapy     Arimidex X 5 years            History of Present Illness: breast cancer follow up  -Interval History: none    Review of Systems:  Review of Systems   Constitutional: Positive for unexpected weight change (weight loss)  Negative for appetite change and fever  Reports occasionally feeling flushed   Eyes: Negative  Respiratory: Negative for shortness of breath  Cardiovascular: Negative  Gastrointestinal: Negative  Endocrine: Negative  Genitourinary: Negative  Musculoskeletal: Negative  Negative for arthralgias and myalgias  Skin: Negative  Allergic/Immunologic: Negative  Neurological: Negative  Hematological: Negative  Negative for adenopathy  Does not bruise/bleed easily  Psychiatric/Behavioral: Negative          Patient Active Problem List Diagnosis    Adjustment disorder with anxiety    Complete heart block (HCC)    GERD without esophagitis    Hyperglycemia    Hyperlipidemia    Essential hypertension    Impingement syndrome of left shoulder    Malignant neoplasm of upper-outer quadrant of right breast in female, estrogen receptor positive (HCC)    Onychomycosis    Osteoporosis    Pruritus    Tinea pedis of both feet    Urinary urgency    Esophageal stenosis    Tick bite    Skin lesion of neck    Nausea    Cancer, metastatic to skin ([facility] [address] )    Pseudogout of right knee    Gastroesophageal reflux disease with esophagitis     Past Medical History:   Diagnosis Date    Cardiac pacemaker     GERD (gastroesophageal reflux disease)     Hearing loss     Heart block     S/P pacemaker     Herpes zoster     last assessed 7/30/13    Hyperlipidemia     Hypertension     Insomnia     last assessed 7/30/13, resolved 6/16/15    Lyme disease     last assessed 7/1/13    Malignant neoplasm of cervix ([facility] [address] )     last assessed 6/27/12, resolved 2/1/17     Osteoporosis     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     last assessed 6/27/12    Use of anastrozole (Arimidex)     took x 5yrs, last assessed 11/19/17      Past Surgical History:   Procedure Laterality Date    BREAST BIOPSY  10/05/2009    percutaneous needle core    BREAST SURGERY Right 11/13/2009    Lumpectomy     CARDIAC PACEMAKER PLACEMENT      dual chamber, last assessed 2/26/16    CATARACT EXTRACTION, BILATERAL      DENTAL SURGERY      ESOPHAGOGASTRODUODENOSCOPY N/A 2/3/2017    Procedure: ESOPHAGOGASTRODUODENOSCOPY (EGD): FOREIGN BODY REMOVAL;  Surgeon: Andres Rodríguez MD;  Location:  MAIN OR;  Service:     ESOPHAGOGASTRODUODENOSCOPY N/A 8/2/2018    Procedure: ESOPHAGOGASTRODUODENOSCOPY (EGD);   Surgeon: Andres Rodríguez MD;  Location: [facility];  Service: Gastroenterology    HERNIA REPAIR Right 01/28/2015    Inguinal, with mesh    HYSTERECTOMY      KNEE SURGERY      resolved 1999    LYMPHADENECTOMY  11/13/2009    Axillary     SENTINEL LYMPH NODE BIOPSY Right 11/13/2009     Family History   Problem Relation Age of Onset    Heart disease Mother     Heart disease Father      Social History     Social History    Marital status:      Spouse name: N/A    Number of children: N/A    Years of education: N/A     Occupational History    Not on file       Social History Main Topics    Smoking status: Former Smoker    Smokeless tobacco: Never Used    Alcohol use No    Drug use: No    Sexual activity: Not on file     Other Topics Concern    Not on file     Social History Narrative    Always uses seat belt        Current Outpatient Prescriptions:     calcium citrate-vitamin D (CITRACAL+D) 315-200 MG-UNIT per tablet, Take by mouth, Disp: , Rfl:     losartan (COZAAR) 50 mg tablet, Take 1 tablet (50 mg total) by mouth daily, Disp: 90 tablet, Rfl: 0    metoprolol tartrate (LOPRESSOR) 50 mg tablet, Take 1 tablet (50 mg total) by mouth 2 (two) times a day, Disp: 180 tablet, Rfl: 3    Multiple Vitamins-Minerals (CENTRUM SILVER ADULT 50+ PO), Take by mouth, Disp: , Rfl:     Omega-3 Fatty Acids (FISH OIL) 645 MG CAPS, Take by mouth, Disp: , Rfl:     pantoprazole (PROTONIX) 40 mg tablet, Take 1 tablet (40 mg total) by mouth 2 (two) times a day before meals, Disp: 180 tablet, Rfl: 3    pravastatin (PRAVACHOL) 20 mg tablet, Take 1 tablet (20 mg total) by mouth daily, Disp: 90 tablet, Rfl: 3    LORazepam (ATIVAN) 0 5 mg tablet, Take 1 tablet (0 5 mg total) by mouth every 6 (six) hours as needed for anxiety for up to 2 days, Disp: 6 tablet, Rfl: 0    Current Facility-Administered Medications:     aluminum-magnesium hydroxide-simethicone (MYLANTA) 200-200-20 mg/5 mL oral suspension 30 mL, 30 mL, Oral, Q4H PRN, Silvio Rapp MD, 30 mL at 02/10/18 1406    lidocaine viscous (XYLOCAINE) 2 % mucosal solution 15 mL, 15 mL, Swish & Spit, 4x Daily PRN, Silvio Rapp MD, 15 mL at 02/10/18 1407  No Known Allergies    The following portions of the patient's history were reviewed and updated as appropriate: allergies, current medications, past family history, past medical history, past social history, past surgical history and problem list         Vitals:    09/06/18 0921   BP: 120/60   Pulse: 97   Resp: 16   Temp: (!) 96 4 °F (35 8 °C)       Physical Exam   Constitutional: She appears well-developed and well-nourished  Pulmonary/Chest: Right breast exhibits skin change (multiple tumor nodules persistent in the upper outer quadrant but the color has diminished)  Right breast exhibits no inverted nipple, no mass, no nipple discharge and no tenderness  Left breast exhibits no inverted nipple, no mass, no nipple discharge, no skin change and no tenderness  Breasts are asymmetrical    Psychiatric: She has a normal mood and affect  Results:  Labs:  07/03/2018 left neck skin biopsy is consistent with breast carcinoma estrogen 95%, progesterone 40% her2 was 2+ but - on FISH    Imaging  No distant disease on CT    I reviewed the above laboratory and imaging data  Discussion/Summary:  70-year-old female who developed skin metastases from her right breast carcinoma  She is currently on fast with dex  On examination today the tumor nodules in the upper outer right breast are still present but have diminished somewhat in color    I will plan to see her again in three months for follow-up exam

## 2018-09-11 RX ORDER — LAMOTRIGINE 25 MG/1
250 TABLET ORAL ONCE
Status: COMPLETED | OUTPATIENT
Start: 2018-09-12 | End: 2018-09-12

## 2018-09-12 ENCOUNTER — HOSPITAL ENCOUNTER (OUTPATIENT)
Dept: INFUSION CENTER | Facility: HOSPITAL | Age: 83
Discharge: HOME | End: 2018-09-12
Payer: MEDICARE

## 2018-09-12 VITALS
HEART RATE: 64 BPM | TEMPERATURE: 97.8 F | DIASTOLIC BLOOD PRESSURE: 64 MMHG | RESPIRATION RATE: 20 BRPM | SYSTOLIC BLOOD PRESSURE: 144 MMHG | OXYGEN SATURATION: 95 %

## 2018-09-12 PROCEDURE — 96402 CHEMO HORMON ANTINEOPL SQ/IM: CPT

## 2018-09-12 RX ADMIN — FULVESTRANT 250 MG: 50 INJECTION INTRAMUSCULAR at 11:15

## 2018-09-12 NOTE — PROGRESS NOTES
Pt arrived via w/c for Faslodex injections  Rt ankle painful and swollen, states the microwave flew out and hit her in the ankle on Sunday  It immediately turned purple  Pt able to walk on it  Did not have xrays or contact her primary Dr Yana Grady she has been icing it, and it's starting to feel better  States she is able to stand for her injections today

## 2018-09-12 NOTE — PROGRESS NOTES
Faslodex injections given both gluts by myself and Natalie Flynn RN  Dsd applied  Instructions reviewed  Asked pt and her daughter to consult with her PCP about pt's ankle  Disch via w/c to home

## 2018-09-13 ENCOUNTER — TELEPHONE (OUTPATIENT)
Dept: FAMILY MEDICINE CLINIC | Facility: HOSPITAL | Age: 83
End: 2018-09-13

## 2018-09-13 ENCOUNTER — OFFICE VISIT (OUTPATIENT)
Dept: URGENT CARE | Facility: CLINIC | Age: 83
End: 2018-09-13
Payer: MEDICARE

## 2018-09-13 ENCOUNTER — APPOINTMENT (OUTPATIENT)
Dept: RADIOLOGY | Facility: CLINIC | Age: 83
End: 2018-09-13
Payer: MEDICARE

## 2018-09-13 VITALS
HEIGHT: 60 IN | RESPIRATION RATE: 16 BRPM | HEART RATE: 86 BPM | OXYGEN SATURATION: 92 % | BODY MASS INDEX: 21.01 KG/M2 | SYSTOLIC BLOOD PRESSURE: 118 MMHG | DIASTOLIC BLOOD PRESSURE: 64 MMHG | WEIGHT: 107 LBS | TEMPERATURE: 98.6 F

## 2018-09-13 DIAGNOSIS — M79.671 RIGHT FOOT PAIN: Primary | ICD-10-CM

## 2018-09-13 DIAGNOSIS — S90.31XA CONTUSION OF RIGHT FOOT, INITIAL ENCOUNTER: ICD-10-CM

## 2018-09-13 DIAGNOSIS — M79.671 RIGHT FOOT PAIN: ICD-10-CM

## 2018-09-13 PROBLEM — S90.30XA CONTUSION OF FOOT: Status: ACTIVE | Noted: 2018-09-13

## 2018-09-13 PROCEDURE — 99213 OFFICE O/P EST LOW 20 MIN: CPT | Performed by: FAMILY MEDICINE

## 2018-09-13 PROCEDURE — G0463 HOSPITAL OUTPT CLINIC VISIT: HCPCS | Performed by: FAMILY MEDICINE

## 2018-09-13 PROCEDURE — 73630 X-RAY EXAM OF FOOT: CPT

## 2018-09-13 NOTE — PATIENT INSTRUCTIONS
As we discussed, the x-rays of the right foot appear negative for fracture  Rest and elevate this foot  Ice and Tylenol as needed

## 2018-09-13 NOTE — PROGRESS NOTES
Assessment/Plan:      Diagnoses and all orders for this visit:    Right foot pain  -     XR foot 3+ vw right; Future    Contusion of right foot, initial encounter          Subjective:     Patient ID: Mat Painter is a 80 y o  female  Four days ago her a microwave ended on her right foot  She has increased pain and discoloration  There was no break in the skin  Review of Systems   Constitutional: Negative  HENT: Negative  Eyes: Negative  Respiratory: Negative  Musculoskeletal:        See HPI         Objective:     Physical Exam   Constitutional: She is oriented to person, place, and time  She appears well-developed and well-nourished  HENT:   Head: Normocephalic and atraumatic  Eyes: Conjunctivae are normal    Pulmonary/Chest: Effort normal    Neurological: She is alert and oriented to person, place, and time  Skin:   There are ecchymotic changes about the area of the of right the lateral malleolus and below

## 2018-09-13 NOTE — TELEPHONE ENCOUNTER
fyi - On Sunday Microwave fell onto R foot - now bruised and swelling - She has full range of motion      Was advised to go to er for xrays by the Olga Lidiayvette Pandey [address] nurse

## 2018-09-19 ENCOUNTER — OFFICE VISIT (OUTPATIENT)
Dept: HEMATOLOGY ONCOLOGY | Facility: HOSPITAL | Age: 83
End: 2018-09-19
Payer: MEDICARE

## 2018-09-19 VITALS
HEIGHT: 60 IN | RESPIRATION RATE: 16 BRPM | DIASTOLIC BLOOD PRESSURE: 72 MMHG | SYSTOLIC BLOOD PRESSURE: 132 MMHG | BODY MASS INDEX: 21.01 KG/M2 | WEIGHT: 107 LBS | OXYGEN SATURATION: 94 % | TEMPERATURE: 97.8 F | HEART RATE: 81 BPM

## 2018-09-19 DIAGNOSIS — Z17.0 MALIGNANT NEOPLASM OF UPPER-OUTER QUADRANT OF RIGHT BREAST IN FEMALE, ESTROGEN RECEPTOR POSITIVE (HCC): Primary | ICD-10-CM

## 2018-09-19 DIAGNOSIS — C50.411 MALIGNANT NEOPLASM OF UPPER-OUTER QUADRANT OF RIGHT BREAST IN FEMALE, ESTROGEN RECEPTOR POSITIVE (HCC): Primary | ICD-10-CM

## 2018-09-19 PROCEDURE — 99214 OFFICE O/P EST MOD 30 MIN: CPT | Performed by: INTERNAL MEDICINE

## 2018-09-19 NOTE — PROGRESS NOTES
Hematology/Oncology Outpatient Follow- up Note  Miah Ahuja 80 y o  female MRN: @ Encounter: [id number]        Date:  9/19/2018    Presenting Complaint/Diagnosis : Metastatic breast cancer    HPI:    Miah Ahuja is seen for initial consultation 7/30/2018 regarding Newly diagnosed metastatic breast cancer  Per the record she was diagnosed with a T2 N3 M0 right-sided breast cancer in 2009  She had a lumpectomy and axillary dissection in November 2009 followed by hormonal therapy and radiation  The patient herself does not remember being on hormonal therapy but per the records she was on this  Regardless more recently she was found to have some lesions An outside facility where she had a biopsy done of the skin lesion in her left neck which actually revealed metastatic carcinoma  It was consistent with her breast primary but estrogen staining and HER-2 staining was not done and we will order this  A CT scan was done which did not show major visceral disease  She does however have lesions on her right breast      Previous Hematologic/ Oncologic History:       Malignant neoplasm of upper-outer quadrant of right breast in female, estrogen receptor positive ([facility] )    11/13/2009 Surgery     Right breast lumpectomy, SLNB, AND         2010 -  Radiation     WBRT,  [place]         8/31/2017 Initial Diagnosis     Malignant neoplasm of upper-outer quadrant of right breast in female, estrogen receptor positive ([facility] )        Hormone Therapy     Arimidex X 5 years            Current Hematologic/ Oncologic Treatment:      Faslodex    Interval History:      The patient returns for follow-up visit  Repeat testing on her biopsy did reveal that the tumor was positive for estrogen receptor  HER-2 was negative  IN was positive  She has been started on Faslodex  She states she is doing well  The nodules In her breast have shrunk  As far as symptoms are concerned she denies any nausea denies any vomiting  Denies any abdominal pain  Overall has no issues with the Faslodex  Her 14 point review of systems today was otherwise negative  Her main complaint that she sometimes has difficulty drinking water but she has had this from prior to her cancer diagnosis and she states she did discuss this with her GI physician  The EGD had shown a higher or hernia and ulcer in the fundus which was injected to control bleeding  There was also gastritis  I suspect this is the cause behind her anemia  Hopefully this will get better now that this has been addressed  Cancer Staging:  Cancer Staging  Malignant neoplasm of upper-outer quadrant of right breast in female, estrogen receptor positive ([facility] )  Staging form: Breast, AJCC 7th Edition  - Pathologic: Stage IIIC (T2, N3, cM0) - Signed by Renae Jacob MD on 7/19/2018  - Pathologic: Stage IV (M1) - Signed by Renae Jacob MD on 9/6/2018    Test Results:    Imaging: Xr Foot 3+ Vw Right    Result Date: 9/13/2018  Narrative: RIGHT FOOT INDICATION:   M79 671: Pain in right foot  Bruising  Lateral pain  COMPARISON:  None VIEWS:  XR FOOT 3+ VW RIGHT FINDINGS: There is no acute fracture or dislocation  Degenerative narrowing interphalangeal joints particularly 4th and 5th toes  No lytic or blastic lesions seen  Soft tissues are unremarkable  Impression: No acute osseous abnormality   Workstation performed: [id number]       Labs:   Lab Results   Component Value Date    WBC 11 23 (H) 08/08/2018    HGB 8 9 (L) 08/08/2018    HCT 27 9 (L) 08/08/2018    MCV 93 08/08/2018     08/08/2018     Lab Results   Component Value Date     08/08/2018    K 4 1 08/08/2018     08/08/2018    CO2 24 08/08/2018    ANIONGAP 7 07/13/2015    BUN 20 08/08/2018    CREATININE 0 93 08/08/2018    GLUCOSE 96 07/13/2015    GLUF 86 04/30/2018    CALCIUM 8 8 08/08/2018    AST 36 08/01/2018    ALT 15 08/01/2018    ALKPHOS 44 (L) 08/01/2018    PROT 7 7 07/13/2015    BILITOT 0 7 07/13/2015    EGFR 53 08/08/2018         ROS: As stated in the history of present illness otherwise his 14 point review of systems today was negative        Active Problems:   Patient Active Problem List   Diagnosis    Adjustment disorder with anxiety    Complete heart block (HCC)    GERD without esophagitis    Hyperglycemia    Hyperlipidemia    Essential hypertension    Impingement syndrome of left shoulder    Malignant neoplasm of upper-outer quadrant of right breast in female, estrogen receptor positive ([facility] )    Onychomycosis    Osteoporosis    Pruritus    Tinea pedis of both feet    Urinary urgency    Esophageal stenosis    Tick bite    Skin lesion of neck    Nausea    Cancer, metastatic to skin ([facility] )    Pseudogout of right knee    Gastroesophageal reflux disease with esophagitis    Contusion of foot       Past Medical History:   Past Medical History:   Diagnosis Date    Cardiac pacemaker     GERD (gastroesophageal reflux disease)     Hearing loss     Heart block     S/P pacemaker     Herpes zoster     last assessed 7/30/13    Hyperlipidemia     Hypertension     Insomnia     last assessed 7/30/13, resolved 6/16/15    Lyme disease     last assessed 7/1/13    Malignant neoplasm of cervix ([facility] )     last assessed 6/27/12, resolved 2/1/17     Osteoporosis     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     last assessed 6/27/12    Use of anastrozole (Arimidex)     took x 5yrs, last assessed 11/19/17        Surgical History:   Past Surgical History:   Procedure Laterality Date    BREAST BIOPSY  10/05/2009    percutaneous needle core    BREAST SURGERY Right 11/13/2009    Lumpectomy     CARDIAC PACEMAKER PLACEMENT      dual chamber, last assessed 2/26/16    CATARACT EXTRACTION, BILATERAL      DENTAL SURGERY      ESOPHAGOGASTRODUODENOSCOPY N/A 2/3/2017    Procedure: ESOPHAGOGASTRODUODENOSCOPY (EGD): FOREIGN BODY REMOVAL;  Surgeon: Krystyna Morton MD;  Location:  MAIN OR;  Service:     ESOPHAGOGASTRODUODENOSCOPY N/A 8/2/2018    Procedure: ESOPHAGOGASTRODUODENOSCOPY (EGD); Surgeon: Shari Hills MD;  [facility];  Service: Gastroenterology    HERNIA REPAIR Right 01/28/2015    Inguinal, with mesh    HYSTERECTOMY      KNEE SURGERY      resolved 1999    LYMPHADENECTOMY  11/13/2009    Axillary     SENTINEL LYMPH NODE BIOPSY Right 11/13/2009       Family History:    Family History   Problem Relation Age of Onset    Heart disease Mother     Heart disease Father        Cancer-related family history is not on file  Social History:   Social History     Social History    Marital status:      Spouse name: N/A    Number of children: N/A    Years of education: N/A     Occupational History    Not on file       Social History Main Topics    Smoking status: Former Smoker    Smokeless tobacco: Never Used    Alcohol use No    Drug use: No    Sexual activity: Not on file     Other Topics Concern    Not on file     Social History Narrative    Always uses seat belt        Current Medications:   Current Outpatient Prescriptions   Medication Sig Dispense Refill    calcium citrate-vitamin D (CITRACAL+D) 315-200 MG-UNIT per tablet Take by mouth      losartan (COZAAR) 50 mg tablet Take 1 tablet (50 mg total) by mouth daily 90 tablet 0    metoprolol tartrate (LOPRESSOR) 50 mg tablet Take 1 tablet (50 mg total) by mouth 2 (two) times a day 180 tablet 3    Multiple Vitamins-Minerals (CENTRUM SILVER ADULT 50+ PO) Take by mouth      Omega-3 Fatty Acids (FISH OIL) 645 MG CAPS Take by mouth      pantoprazole (PROTONIX) 40 mg tablet Take 1 tablet (40 mg total) by mouth 2 (two) times a day before meals 180 tablet 3    pravastatin (PRAVACHOL) 20 mg tablet Take 1 tablet (20 mg total) by mouth daily 90 tablet 3    LORazepam (ATIVAN) 0 5 mg tablet Take 1 tablet (0 5 mg total) by mouth every 6 (six) hours as needed for anxiety for up to 2 days 6 tablet 0     Current Facility-Administered Medications   Medication Dose Route Frequency Provider Last Rate Last Dose    aluminum-magnesium hydroxide-simethicone (MYLANTA) 200-200-20 mg/5 mL oral suspension 30 mL  30 mL Oral Q4H PRN Torey Urbina MD   30 mL at 02/10/18 1406    lidocaine viscous (XYLOCAINE) 2 % mucosal solution 15 mL  15 mL Swish & Spit 4x Daily PRN Torey Urbina MD   15 mL at 02/10/18 1407       Allergies: No Known Allergies    Physical Exam:    Body surface area is 1 43 meters squared  Wt Readings from Last 3 Encounters:   09/19/18 48 5 kg (107 lb)   09/13/18 48 5 kg (107 lb)   09/06/18 48 5 kg (107 lb)        Temp Readings from Last 3 Encounters:   09/19/18 97 8 °F (36 6 °C) (Tympanic)   09/13/18 98 6 °F (37 °C)   09/12/18 97 8 °F (36 6 °C) (Tympanic)        BP Readings from Last 3 Encounters:   09/19/18 132/72   09/13/18 118/64   09/12/18 144/64         Pulse Readings from Last 3 Encounters:   09/19/18 81   09/13/18 86   09/12/18 64     @LASTSAO2(3)@      Physical Exam     Constitutional   General appearance: No acute distress, well appearing and well nourished  Eyes   Conjunctiva and lids: No swelling, erythema or discharge  Pupils and irises: Equal, round and reactive to light  Ears, Nose, Mouth, and Throat   External inspection of ears and nose: Normal     Nasal mucosa, septum, and turbinates: Normal without edema or erythema  Oropharynx: Normal with no erythema, edema, exudate or lesions  Pulmonary   Respiratory effort: No increased work of breathing or signs of respiratory distress  Auscultation of lungs: Clear to auscultation  Cardiovascular   Palpation of heart: Normal PMI, no thrills  Auscultation of heart: Normal rate and rhythm, normal S1 and S2, without murmurs  Examination of extremities for edema and/or varicosities: Normal     Carotid pulses: Normal     Abdomen   Abdomen: Non-tender, no masses  Liver and spleen: No hepatomegaly or splenomegaly  Lymphatic   Palpation of lymph nodes in neck: No lymphadenopathy      Musculoskeletal   Gait and station: In a wheelchair  The patient apparently had a microwave fall on her right foot  Digits and nails: Normal without clubbing or cyanosis  Inspection/palpation of joints, bones, and muscles: Normal     Skin   Skin and subcutaneous tissue: Normal without rashes or lesions  Neurologic   Cranial nerves: Cranial nerves 2-12 intact  Sensation: No sensory loss  Psychiatric   Orientation to person, place, and time: Normal     Mood and affect: Normal         Assessment / Plan:    The patient is a pleasant 80-year-old female with recurrence of her breast cancer  She has ER/AR positive disease  We started her on Faslodex  She is tolerating it well  She seems to be having a clinical response  We will continue her on therapy  I'll see her back in 4 months  She will get monthly Faslodex  Goals and Barriers:  Current Goal:  Prolong Survival from Breast cancer  Barriers: None  Patient's Capacity to Self Care:  Patient able to self care  Portions of the record may have been created with voice recognition software   Occasional wrong word or "sound a like" substitutions may have occurred due to the inherent limitations of voice recognition software   Read the chart carefully and recognize, using context, where substitutions have occurred

## 2018-09-24 ENCOUNTER — APPOINTMENT (OUTPATIENT)
Dept: LAB | Facility: HOSPITAL | Age: 83
End: 2018-09-24
Attending: INTERNAL MEDICINE
Payer: MEDICARE

## 2018-09-24 RX ORDER — LAMOTRIGINE 25 MG/1
250 TABLET ORAL ONCE
Status: COMPLETED | OUTPATIENT
Start: 2018-09-26 | End: 2018-09-26

## 2018-09-26 ENCOUNTER — HOSPITAL ENCOUNTER (OUTPATIENT)
Dept: INFUSION CENTER | Facility: HOSPITAL | Age: 83
Discharge: HOME | End: 2018-09-26
Payer: MEDICARE

## 2018-09-26 ENCOUNTER — OFFICE VISIT (OUTPATIENT)
Dept: FAMILY MEDICINE CLINIC | Facility: HOSPITAL | Age: 83
End: 2018-09-26
Payer: MEDICARE

## 2018-09-26 VITALS
SYSTOLIC BLOOD PRESSURE: 126 MMHG | HEART RATE: 75 BPM | DIASTOLIC BLOOD PRESSURE: 54 MMHG | TEMPERATURE: 97.8 F | RESPIRATION RATE: 18 BRPM | OXYGEN SATURATION: 95 %

## 2018-09-26 VITALS
HEART RATE: 70 BPM | TEMPERATURE: 97.8 F | HEIGHT: 59 IN | BODY MASS INDEX: 21.77 KG/M2 | SYSTOLIC BLOOD PRESSURE: 120 MMHG | WEIGHT: 108 LBS | DIASTOLIC BLOOD PRESSURE: 60 MMHG

## 2018-09-26 DIAGNOSIS — Z00.00 MEDICARE ANNUAL WELLNESS VISIT, SUBSEQUENT: Primary | ICD-10-CM

## 2018-09-26 DIAGNOSIS — I10 ESSENTIAL HYPERTENSION: ICD-10-CM

## 2018-09-26 DIAGNOSIS — C50.411 MALIGNANT NEOPLASM OF UPPER-OUTER QUADRANT OF RIGHT BREAST IN FEMALE, ESTROGEN RECEPTOR POSITIVE (HCC): ICD-10-CM

## 2018-09-26 DIAGNOSIS — Z17.0 MALIGNANT NEOPLASM OF UPPER-OUTER QUADRANT OF RIGHT BREAST IN FEMALE, ESTROGEN RECEPTOR POSITIVE (HCC): ICD-10-CM

## 2018-09-26 PROCEDURE — G0439 PPPS, SUBSEQ VISIT: HCPCS | Performed by: FAMILY MEDICINE

## 2018-09-26 PROCEDURE — 96402 CHEMO HORMON ANTINEOPL SQ/IM: CPT

## 2018-09-26 RX ADMIN — FULVESTRANT 250 MG: 50 INJECTION INTRAMUSCULAR at 09:19

## 2018-09-26 RX ADMIN — FULVESTRANT 250 MG: 50 INJECTION INTRAMUSCULAR at 09:16

## 2018-09-26 NOTE — PATIENT INSTRUCTIONS

## 2018-09-26 NOTE — PROGRESS NOTES
Pt arrived via w/c for Faslodex injections  Familiar with procedure  Faslodex given both gluts, dsd applied  Pt abbie well  Disch via w/c to home

## 2018-09-26 NOTE — PROGRESS NOTES
Assessment and Plan:  Problem List Items Addressed This Visit     Essential hypertension    Malignant neoplasm of upper-outer quadrant of right breast in female, estrogen receptor positive ([facility] )    Medicare annual wellness visit, subsequent - Primary        Health Maintenance Due   Topic Date Due    Medicare Annual Wellness Visit (AWV)  [birth year]10/1924    DTaP,Tdap,and Td Vaccines (1 - Tdap) 06/05/2012    INFLUENZA VACCINE  09/01/2018         HPI:  Patient Active Problem List   Diagnosis    Adjustment disorder with anxiety    [facility]    GERD without esophagitis    Hyperglycemia    Hyperlipidemia    Essential hypertension    Impingement syndrome of left shoulder    Malignant neoplasm of upper-outer quadrant of right breast in female, estrogen receptor positive ([facility] )    Onychomycosis    Osteoporosis    Pruritus    Tinea pedis of both feet    Urinary urgency    Esophageal stenosis    Tick bite    Skin lesion of neck    Nausea    Cancer, metastatic to skin ([facility] )    Pseudogout of right knee    Gastroesophageal reflux disease with esophagitis    Contusion of foot    Medicare annual wellness visit, subsequent     Past Medical History:   Diagnosis Date    Cardiac pacemaker     GERD (gastroesophageal reflux disease)     Hearing loss     Heart block     S/P pacemaker     Herpes zoster     last assessed 7/30/13    Hyperlipidemia     Hypertension     Insomnia     last assessed 7/30/13, resolved 6/16/15    Lyme disease     last assessed 7/1/13    Malignant neoplasm of cervix ([facility] )     last assessed 6/27/12, resolved 2/1/17     Osteoporosis     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 ([facility] )     last assessed 6/27/12    Use of anastrozole (Arimidex)     took x 5yrs, last assessed 11/19/17      Past Surgical History:   Procedure Laterality Date    BREAST BIOPSY  10/05/2009    percutaneous needle core    BREAST SURGERY Right 11/13/2009    Lumpectomy     CARDIAC PACEMAKER PLACEMENT      dual chamber, last assessed 2/26/16    CATARACT EXTRACTION, BILATERAL      DENTAL SURGERY      ESOPHAGOGASTRODUODENOSCOPY N/A 2/3/2017    Procedure: ESOPHAGOGASTRODUODENOSCOPY (EGD): FOREIGN BODY REMOVAL;  Surgeon: Brendon Hurtado MD;  [facility];  Service:     ESOPHAGOGASTRODUODENOSCOPY N/A 8/2/2018    Procedure: ESOPHAGOGASTRODUODENOSCOPY (EGD);   Surgeon: Brendon Hurtado MD;  [facility];  Service: Gastroenterology    HERNIA REPAIR Right 01/28/2015    Inguinal, with mesh    HYSTERECTOMY      KNEE SURGERY      resolved 1999    LYMPHADENECTOMY  11/13/2009    Axillary     SENTINEL LYMPH NODE BIOPSY Right 11/13/2009     Family History   Problem Relation Age of Onset    Heart disease Mother     Heart disease Father      History   Smoking Status    Former Smoker   Smokeless Tobacco    Never Used     History   Alcohol Use No      History   Drug Use No         Current Outpatient Prescriptions   Medication Sig Dispense Refill    calcium citrate-vitamin D (CITRACAL+D) 315-200 MG-UNIT per tablet Take by mouth      LORazepam (ATIVAN) 0 5 mg tablet Take 1 tablet (0 5 mg total) by mouth every 6 (six) hours as needed for anxiety for up to 2 days 6 tablet 0    losartan (COZAAR) 50 mg tablet Take 1 tablet (50 mg total) by mouth daily 90 tablet 0    metoprolol tartrate (LOPRESSOR) 50 mg tablet Take 1 tablet (50 mg total) by mouth 2 (two) times a day 180 tablet 3    Multiple Vitamins-Minerals (CENTRUM SILVER ADULT 50+ PO) Take by mouth      Omega-3 Fatty Acids (FISH OIL) 645 MG CAPS Take by mouth      pantoprazole (PROTONIX) 40 mg tablet Take 1 tablet (40 mg total) by mouth 2 (two) times a day before meals 180 tablet 3    pravastatin (PRAVACHOL) 20 mg tablet Take 1 tablet (20 mg total) by mouth daily 90 tablet 3     Current Facility-Administered Medications   Medication Dose Route Frequency Provider Last Rate Last Dose    aluminum-magnesium hydroxide-simethicone (MYLANTA) 200-200-20 mg/5 mL oral suspension 30 mL  30 mL Oral Q4H PRN Jp Williamson MD   30 mL at 02/10/18 1406    lidocaine viscous (XYLOCAINE) 2 % mucosal solution 15 mL  15 mL Swish & Spit 4x Daily PRN Jp Williamson MD   15 mL at 02/10/18 1407     Facility-Administered Medications Ordered in Other Visits   Medication Dose Route Frequency Provider Last Rate Last Dose    alteplase (CATHFLO) injection 2 mg  2 mg Intracatheter Once PRN Delmar Scott MD        heparin lock flush 100 units/mL injection 300 Units  300 Units Intracatheter Q1H PRN Delmar Scott MD         No Known Allergies  Immunization History   Administered Date(s) Administered    Influenza 12/09/2004, 11/03/2015, 10/31/2016    Influenza Split High Dose Preservative Free IM 11/05/2012, 10/17/2013, 09/10/2014, 11/03/2015, 10/31/2016, 09/05/2017    Pneumococcal Conjugate 13-Valent 07/21/2015    Pneumococcal Polysaccharide PPV23 07/29/1996    Td (adult), adsorbed 06/04/2012       Patient Care Team:  Timothy Harman MD as PCP - General  MD AMBER Walter DPM Illa Parma, MD Ronnell Bumps, MD Jane Kitchens, CRNP    Medicare Screening Tests and Risk Assessments:  Jessie Schmid is here for her Subsequent Wellness visit  Last Medicare Wellness visit information reviewed, patient interviewed, no change since last AWV  Health Risk Assessment:  Patient rates overall health as poor  Patient feels that their physical health rating is Much worse  Eyesight was rated as Same  Hearing was rated as Much worse  Patient feels that their emotional and mental health rating is Slightly worse  Pain experienced by patient in the last 7 days has been Some  Patient's pain rating has been 5/10  Patient states that she has experienced no weight loss or gain in last 6 months  Emotional/Mental Health:  Patient has been feeling nervous/anxious  PHQ-9 Depression Screening:    Frequency of the following problems over the past two weeks:      1   Little interest or pleasure in doing things: 1 - several days      2  Feeling down, depressed, or hopeless: 1 - several days      3  Trouble falling or staying asleep, or sleeping too much: 3 - nearly every day      4  Feeling tired or having little energy: 3 - nearly every day      5  Poor appetite or overeatin - not at all      6  Feeling bad about yourself - or that you are a failure or have let yourself or your family down: 1 - several days      7  Trouble concentrating on things, such as reading the newspaper or watching television: 0 - not at all      8  Moving or speaking so slowly that other people could have noticed  Or the opposite - being so fidgety or restless that you have been moving around a lot more than usual: 1 - several days      9  Thoughts that you would be better off dead, or of hurting yourself in some way: 1 - several days  PHQ-2 Score: 2  PHQ-9 Score: 11    Broken Bones/Falls: Fall Risk Assessment:    In the past year, patient has experienced: No history of falling in past year          Bladder/Bowel:  Patient has not leaked urine accidently in the last six months  Patient reports no loss of bowel control  Immunizations:  Patient has not had a flu vaccination within the last year  Patient has received a pneumonia shot  Patient has received a shingles shot  Home Safety:  Patient has trouble with stairs inside or outside of their home  Patient currently reports that there are no safety hazards present in home, working smoke alarms, working carbon monoxide detectors  Nutrition:  Current diet: Regular, Limited junk food and No Added Salt with servings of the following:    Medications:  Patient is currently taking over-the-counter supplements  List of OTC medications includes: fish oil, centrum silver  Patient is able to manage medications  Lifestyle Choices:  Patient reports no tobacco use  Patient has not smoked or used tobacco in the past   Patient reports no alcohol use  Patient does not drive a vehicle  Patient wears seat belt  Activities of Daily Living:  Can get out of bed by his or her self, able to dress self, able to make own meals, unable to do own shopping, able to bathe self, unable to do laundry/housekeeping, can manage own money, pay bills and track expenses    Previous Hospitalizations:  Hospitalization or ED visit in past 12 months  Additional Comments: Bleeding ulcer    Advanced Directives:  Patient has decided on a power of   Patient has spoken to designated power of   Patient has completed advanced directive  Preventative Screening/Counseling:      Cardiovascular:      General: Screening Current          Diabetes:      General: Screening Current          Colorectal Cancer:      General: Screening Not Indicated          Breast Cancer:      General: Screening Current          Cervical Cancer:      General: Screening Not Indicated          Osteoporosis:      General: Screening Current          AAA:      General: Screening Not Indicated          Glaucoma:      General: Screening Current          HIV:      General: Screening Not Indicated          Hepatitis C:      General: Screening Not Indicated        Advanced Directives:   Patient has living will for healthcare, has durable POA for healthcare, patient has an advanced directive  End of life assessment reviewed with patient  Provider agrees with end of life decisions   Visual Acuity Screening    Right eye Left eye Both eyes   Without correction: 20/20 20/20 20/20   With correction:          Physical Exam:  Review of Systems   Constitutional: Negative for unexpected weight change  HENT: Negative for sore throat  Gastrointestinal: Positive for abdominal pain and nausea  Negative for bowel incontinence, constipation and diarrhea  Hematological: Negative for adenopathy  Does not bruise/bleed easily  Psychiatric/Behavioral: Negative for sleep disturbance  The patient is not nervous/anxious          Vitals: 09/26/18 0946   BP: 120/60   Pulse: 70   Temp: 97 8 °F (36 6 °C)   Weight: 49 kg (108 lb)   Height: 4' 11" (1 499 m)   Body mass index is 21 81 kg/m²  Physical Exam   Constitutional: She is oriented to person, place, and time  She appears well-developed and well-nourished  Neck: Normal range of motion  Neck supple  Cardiovascular: Normal rate and regular rhythm  Pulmonary/Chest: Effort normal and breath sounds normal    Abdominal: Bowel sounds are normal    Neurological: She is alert and oriented to person, place, and time  Psychiatric: She has a normal mood and affect  Her behavior is normal  Judgment and thought content normal    Nursing note and vitals reviewed

## 2018-09-27 ENCOUNTER — TELEPHONE (OUTPATIENT)
Dept: FAMILY MEDICINE CLINIC | Facility: HOSPITAL | Age: 83
End: 2018-09-27

## 2018-09-27 NOTE — TELEPHONE ENCOUNTER
Patient has requested to start back up with physical therapy now that her ankle is feeling better    The nurse will be d/c'ing her from home care  at some point 9/28  The physical therapist is hoping to get out to see her prior to the d/c so they can keep her on their service  Please call Betty back to let her know if she can go out to see sergey 9/28      Ok to leave message @ [phone]

## 2018-10-17 ENCOUNTER — OFFICE VISIT (OUTPATIENT)
Dept: FAMILY MEDICINE CLINIC | Facility: HOSPITAL | Age: 83
End: 2018-10-17
Payer: MEDICARE

## 2018-10-17 VITALS
BODY MASS INDEX: 21.61 KG/M2 | DIASTOLIC BLOOD PRESSURE: 78 MMHG | HEART RATE: 84 BPM | SYSTOLIC BLOOD PRESSURE: 140 MMHG | HEIGHT: 59 IN | WEIGHT: 107.2 LBS | TEMPERATURE: 96.9 F

## 2018-10-17 DIAGNOSIS — C50.411 MALIGNANT NEOPLASM OF UPPER-OUTER QUADRANT OF RIGHT BREAST IN FEMALE, ESTROGEN RECEPTOR POSITIVE (HCC): ICD-10-CM

## 2018-10-17 DIAGNOSIS — K25.4 GASTRIC ULCER WITH HEMORRHAGE, UNSPECIFIED CHRONICITY: ICD-10-CM

## 2018-10-17 DIAGNOSIS — E78.2 MIXED HYPERLIPIDEMIA: ICD-10-CM

## 2018-10-17 DIAGNOSIS — Z13.820 ENCOUNTER FOR SCREENING FOR OSTEOPOROSIS: ICD-10-CM

## 2018-10-17 DIAGNOSIS — Z23 NEED FOR INFLUENZA VACCINATION: ICD-10-CM

## 2018-10-17 DIAGNOSIS — I10 ESSENTIAL HYPERTENSION: Primary | ICD-10-CM

## 2018-10-17 DIAGNOSIS — Z17.0 MALIGNANT NEOPLASM OF UPPER-OUTER QUADRANT OF RIGHT BREAST IN FEMALE, ESTROGEN RECEPTOR POSITIVE (HCC): ICD-10-CM

## 2018-10-17 DIAGNOSIS — K22.2 ESOPHAGEAL STENOSIS: ICD-10-CM

## 2018-10-17 DIAGNOSIS — F43.22 ADJUSTMENT DISORDER WITH ANXIETY: ICD-10-CM

## 2018-10-17 PROCEDURE — 99214 OFFICE O/P EST MOD 30 MIN: CPT | Performed by: FAMILY MEDICINE

## 2018-10-17 PROCEDURE — G0008 ADMIN INFLUENZA VIRUS VAC: HCPCS | Performed by: FAMILY MEDICINE

## 2018-10-17 PROCEDURE — 90662 IIV NO PRSV INCREASED AG IM: CPT | Performed by: FAMILY MEDICINE

## 2018-10-17 NOTE — PROGRESS NOTES
Assessment/Plan:         Diagnoses and all orders for this visit:    Essential hypertension  Comments:  Excellent control    Malignant neoplasm of upper-outer quadrant of right breast in female, estrogen receptor positive ([facility] [address] )  Comments:  Clinically stable, tolerating Faslodex injexction schedule    Gastric ulcer with hemorrhage, unspecified chronicity  Comments:  Return to GI for rescoping of ulcer area    Esophageal stenosis    Mixed hyperlipidemia  Comments:  OK to D/C fish oil    Adjustment disorder with anxiety  Comments:  Good stability with Lorazepam    Need for influenza vaccination  -     influenza vaccine, [id number], high-dose, PF 0 5 mL, for patients 65 yr+ (FLUZONE HIGH-DOSE)    Encounter for screening for osteoporosis  -     DXA bone density spine hip and pelvis; Future          Subjective:      Patient ID: Baldo Peng is a 80 y o  female  1 month follow up  Wonders if she needs to continue Fish Oil  Never really felt right on it  Has trouble with drinking water because it repeats on her  Able to drink Ensure and tomato juice without similar problem    Being contacted by GI for recheck of gastric ulcer/bleed, she didn't understand why she needed to go back  Getting Faslodex injections, gets sore but is tolerating it well  The following portions of the patient's history were reviewed and updated as appropriate: allergies, current medications, past family history, past medical history, past social history, past surgical history and problem list     Review of Systems   Constitutional: Positive for appetite change  Negative for unexpected weight change  HENT: Negative for congestion  Respiratory: Negative for shortness of breath  Cardiovascular: Negative  Gastrointestinal: Negative for abdominal pain, constipation and diarrhea  Genitourinary: Negative  Musculoskeletal: Positive for arthralgias and myalgias  Skin: Negative for rash  Neurological: Negative  Hematological: Negative  Psychiatric/Behavioral: Negative for dysphoric mood and sleep disturbance  The patient is not nervous/anxious  All other systems reviewed and are negative  Objective:      /78   Pulse 84   Temp (!) 96 9 °F (36 1 °C)   Ht 4' 11" (1 499 m)   Wt 48 6 kg (107 lb 3 2 oz)   BMI 21 65 kg/m²          Physical Exam   Constitutional: She is oriented to person, place, and time  She appears well-developed and well-nourished  HENT:   Head: Normocephalic and atraumatic  Neck: No thyromegaly present  Cardiovascular: Normal rate, regular rhythm and normal heart sounds  Musculoskeletal: She exhibits no edema  Neurological: She is alert and oriented to person, place, and time  Skin: No rash noted  Psychiatric: She has a normal mood and affect  Her behavior is normal  Judgment and thought content normal    Nursing note and vitals reviewed

## 2018-10-19 RX ORDER — LAMOTRIGINE 25 MG/1
250 TABLET ORAL ONCE
Status: COMPLETED | OUTPATIENT
Start: 2018-10-24 | End: 2018-10-24

## 2018-10-22 ENCOUNTER — APPOINTMENT (OUTPATIENT)
Dept: LAB | Facility: HOSPITAL | Age: 83
End: 2018-10-22
Attending: INTERNAL MEDICINE
Payer: MEDICARE

## 2018-10-24 ENCOUNTER — HOSPITAL ENCOUNTER (OUTPATIENT)
Dept: INFUSION CENTER | Facility: HOSPITAL | Age: 83
Discharge: HOME | End: 2018-10-24
Payer: MEDICARE

## 2018-10-24 VITALS
HEART RATE: 77 BPM | DIASTOLIC BLOOD PRESSURE: 72 MMHG | TEMPERATURE: 97.7 F | RESPIRATION RATE: 14 BRPM | SYSTOLIC BLOOD PRESSURE: 151 MMHG

## 2018-10-24 PROCEDURE — 96402 CHEMO HORMON ANTINEOPL SQ/IM: CPT

## 2018-10-24 RX ADMIN — FULVESTRANT 250 MG: 50 INJECTION INTRAMUSCULAR at 11:01

## 2018-10-24 RX ADMIN — FULVESTRANT 250 MG: 50 INJECTION INTRAMUSCULAR at 11:00

## 2018-10-24 NOTE — PLAN OF CARE
Problem: Potential for Falls  Goal: Patient will remain free of falls  INTERVENTIONS:  - Assess patient frequently for physical needs  -  Identify cognitive and physical deficits and behaviors that affect risk of falls    -  Benson fall precautions as indicated by assessment   - Educate patient/family on patient safety including physical limitations  - Instruct patient to call for assistance with activity based on assessment  - Modify environment to reduce risk of injury  - Consider OT/PT consult to assist with strengthening/mobility   Outcome: Progressing

## 2018-11-02 RX ORDER — SODIUM CHLORIDE 9 MG/ML
20 INJECTION, SOLUTION INTRAVENOUS CONTINUOUS
Status: CANCELLED | OUTPATIENT
Start: 2018-11-07 | End: 2018-11-07

## 2018-11-05 RX ORDER — LAMOTRIGINE 25 MG/1
500 TABLET ORAL ONCE
COMMUNITY

## 2018-11-05 NOTE — PRE-PROCEDURE INSTRUCTIONS
Pre-Surgery Instructions:   Medication Instructions    ALTEPLASE IV Instructed patient per Anesthesia Guidelines   calcium citrate-vitamin D (CITRACAL+D) 315-200 MG-UNIT per tablet Instructed patient per Anesthesia Guidelines   fulvestrant (FASLODEX) 250 mg/5 mL Instructed patient per Anesthesia Guidelines   LORazepam (ATIVAN) 0 5 mg tablet Instructed patient per Anesthesia Guidelines   losartan (COZAAR) 50 mg tablet Instructed patient per Anesthesia Guidelines   metoprolol tartrate (LOPRESSOR) 50 mg tablet Instructed patient per Anesthesia Guidelines   Multiple Vitamins-Minerals (CENTRUM SILVER ADULT 50+ PO) Instructed patient per Anesthesia Guidelines   pantoprazole (PROTONIX) 40 mg tablet Instructed patient per Anesthesia Guidelines   pravastatin (PRAVACHOL) 20 mg tablet Instructed patient per Anesthesia Guidelines  Follow prep as per physician  You will receive a call with your time to arrive at the hospital   Secure transportation, you will be having anesthesia

## 2018-11-07 ENCOUNTER — ANESTHESIA (OUTPATIENT)
Dept: PERIOP | Facility: HOSPITAL | Age: 83
End: 2018-11-07
Payer: MEDICARE

## 2018-11-07 ENCOUNTER — ANESTHESIA EVENT (OUTPATIENT)
Dept: PERIOP | Facility: HOSPITAL | Age: 83
End: 2018-11-07
Payer: MEDICARE

## 2018-11-07 ENCOUNTER — HOSPITAL ENCOUNTER (OUTPATIENT)
Facility: HOSPITAL | Age: 83
Setting detail: OUTPATIENT SURGERY
Discharge: HOME | End: 2018-11-07
Attending: INTERNAL MEDICINE | Admitting: INTERNAL MEDICINE
Payer: MEDICARE

## 2018-11-07 VITALS
HEIGHT: 55 IN | TEMPERATURE: 97.9 F | RESPIRATION RATE: 18 BRPM | OXYGEN SATURATION: 94 % | BODY MASS INDEX: 24.76 KG/M2 | SYSTOLIC BLOOD PRESSURE: 169 MMHG | HEART RATE: 79 BPM | DIASTOLIC BLOOD PRESSURE: 75 MMHG | WEIGHT: 107 LBS

## 2018-11-07 DIAGNOSIS — K25.4 GASTRIC ULCER WITH HEMORRHAGE, UNSPECIFIED CHRONICITY: ICD-10-CM

## 2018-11-07 DIAGNOSIS — B37.81 CANDIDA ESOPHAGITIS (HCC): Primary | ICD-10-CM

## 2018-11-07 LAB
BASOPHILS # BLD AUTO: 0.02 THOUSANDS/ΜL (ref 0–0.1)
BASOPHILS NFR BLD AUTO: 0 % (ref 0–1)
EOSINOPHIL # BLD AUTO: 0.24 THOUSAND/ΜL (ref 0–0.61)
EOSINOPHIL NFR BLD AUTO: 5 % (ref 0–6)
ERYTHROCYTE [DISTWIDTH] IN BLOOD BY AUTOMATED COUNT: 15.2 % (ref 11.6–15.1)
HCT VFR BLD AUTO: 33.6 % (ref 34.8–46.1)
HGB BLD-MCNC: 10.7 G/DL (ref 11.5–15.4)
IMM GRANULOCYTES # BLD AUTO: 0.01 THOUSAND/UL (ref 0–0.2)
IMM GRANULOCYTES NFR BLD AUTO: 0 % (ref 0–2)
LYMPHOCYTES # BLD AUTO: 1.33 THOUSANDS/ΜL (ref 0.6–4.47)
LYMPHOCYTES NFR BLD AUTO: 30 % (ref 14–44)
MCH RBC QN AUTO: 29 PG (ref 26.8–34.3)
MCHC RBC AUTO-ENTMCNC: 31.8 G/DL (ref 31.4–37.4)
MCV RBC AUTO: 91 FL (ref 82–98)
MONOCYTES # BLD AUTO: 0.55 THOUSAND/ΜL (ref 0.17–1.22)
MONOCYTES NFR BLD AUTO: 12 % (ref 4–12)
NEUTROPHILS # BLD AUTO: 2.35 THOUSANDS/ΜL (ref 1.85–7.62)
NEUTS SEG NFR BLD AUTO: 53 % (ref 43–75)
PLATELET # BLD AUTO: 160 THOUSANDS/UL (ref 149–390)
PMV BLD AUTO: 9.2 FL (ref 8.9–12.7)
RBC # BLD AUTO: 3.69 MILLION/UL (ref 3.81–5.12)
WBC # BLD AUTO: 4.5 THOUSAND/UL (ref 4.31–10.16)

## 2018-11-07 PROCEDURE — 85025 COMPLETE CBC W/AUTO DIFF WBC: CPT | Performed by: INTERNAL MEDICINE

## 2018-11-07 PROCEDURE — 87077 CULTURE AEROBIC IDENTIFY: CPT | Performed by: INTERNAL MEDICINE

## 2018-11-07 RX ORDER — PROPOFOL 10 MG/ML
INJECTION, EMULSION INTRAVENOUS AS NEEDED
Status: DISCONTINUED | OUTPATIENT
Start: 2018-11-07 | End: 2018-11-07 | Stop reason: SURG

## 2018-11-07 RX ORDER — SODIUM CHLORIDE 9 MG/ML
20 INJECTION, SOLUTION INTRAVENOUS CONTINUOUS
Status: DISCONTINUED | OUTPATIENT
Start: 2018-11-07 | End: 2018-11-07 | Stop reason: HOSPADM

## 2018-11-07 RX ADMIN — SODIUM CHLORIDE 20 ML/HR: 0.9 INJECTION, SOLUTION INTRAVENOUS at 10:20

## 2018-11-07 RX ADMIN — PROPOFOL 50 MG: 10 INJECTION, EMULSION INTRAVENOUS at 11:20

## 2018-11-07 RX ADMIN — SODIUM CHLORIDE: 0.9 INJECTION, SOLUTION INTRAVENOUS at 11:15

## 2018-11-07 RX ADMIN — PROPOFOL 50 MG: 10 INJECTION, EMULSION INTRAVENOUS at 11:25

## 2018-11-07 NOTE — DISCHARGE INSTR - AVS FIRST PAGE
OPERATIVE REPORT  PATIENT NAME: Paola Tello    :  8/10/1924  MRN: [id number]  Pt Location:  GI ROOM 01    ESOPHAGOGASTRODUODENOSCOPY    PROCEDURE: EGD biospy, cautery of avm and endoclip application    SEDATION: Monitored anesthesia care, check anesthesia records    ASA Class: 3    INDICATIONS: Follow up Bleeding Gastric Ulcer     CONSENT:  Informed consent was obtained for the procedure, including sedation after explaining the risks and benefits of the procedure  Risks including but not limited to bleeding, perforation, infection, and missed lesion  PREPARATION:   Telemetry, pulse oximetry, blood pressure were monitored throughout the procedure  Patient was identified by myself both verbally and by visual inspection of ID band  DESCRIPTION:   Patient was placed in the left lateral decubitus position and was sedated with the above medication  The gastroscope was introduced in to the oropharynx and the esophagus was intubated under direct visualization  Scope was passed down the esophagus up to 2nd part of the duodenum  A careful inspection was made as the gastroscope was withdrawn, including a retroflexed view of the stomach; findings and interventions are described below  SPECIMENS TAKEN:      Frances test - antral biopsy     ESTIMATED BLOOD LOSS:      10 ml       FINDINGS:    #1  Esophagus- Mild Candida esophagitis - proximal esophagus, small sliding hiatal hernia ( 2 cm )      #2  Stomach- Bleeding avm - ( oozing with contact ) cauterized with bipolar device - 15 J - then 1 clipped applied      #3  Duodenum- Normal bulb and d-2         IMPRESSIONS:      See above      RECOMMENDATIONS:     Check frances test   Office visit 4-8 weeks  Check cbc  Same Medications   Add Nystatin swish and swallow 5 ml 4 times per day for 10 days     COMPLICATIONS:  None; patient tolerated the procedure well            DISPOSITION: PACU           CONDITION: Stable    SIGNATURE: Melanie Latif MD  DATE: 2018  TIME: 11:44 AM    Beckie Mcdonald      Dear Patient,    If specimens were collected, the office will call you with the pathology results within 2 weeks  It is very important that you follow these instructions:    Because you received intravenous sedation for your exam, you must return home and   · REST TODAY - DO NOT STAY ALONE  · DO NOT operate machinery for 24 hours   · DO NOT drink alcohol for 24 hours  · DO NOT drive for 24 hours  · DO NOT return to work until tomorrow   · DO NOT sign important paperwork for 24 hours    ·  If the site where your IV was placed is painful, place warm wet  compresses on the site until the soreness is relieved  Call us if there  is no improvement within 24 hours  · Unless otherwise instructed, you may resume your regular diet,  Start by taking  crackers and clear liquids  If no nausea or vomiting, you may advance your diet as tolerated  · Call your physician at [phone] if you develop any of the following symptoms:  · NEW OR INCREASED BLEEDING  · NEW ABDOMINAL DISTENTION (SWELLING)  · INCREASING NAUSEA, VOMITING OR PAIN  · FEVER/CHILLS  · BLACK/BLOODY STOOLS    If you are unable to contact the the doctor or your primary physician and you are having a severe complication, go to the nearest emergency room or call 911  Call [facility] at [phone] if you have any problems or questions related to your procedure  NOTE: After normal office hours (Monday-Friday 9:00 a m  to 4:00 p m ), the answering service will answer the phone, take a message, and they will contact the on call physician to call you

## 2018-11-07 NOTE — ANESTHESIA PREPROCEDURE EVALUATION
Review of Systems/Medical History  Patient summary reviewed  Chart reviewed      Cardiovascular  Pacemaker/AICD,    Pulmonary       GI/Hepatic            Endo/Other     GYN    Breast cancer        Hematology   Musculoskeletal       Neurology   Psychology           Physical Exam    Airway    Mallampati score: II  TM Distance: >3 FB  Neck ROM: full     Dental   Comment: edentulous,     Cardiovascular  Rhythm: regular, Rate: normal, Cardiovascular exam normal    Pulmonary  Pulmonary exam normal Breath sounds clear to auscultation,     Other Findings        Anesthesia Plan  ASA Score- 3     Anesthesia Type- IV sedation with anesthesia with ASA Monitors  Additional Monitors:   Airway Plan:     Comment:     Plan Factors-    Induction- intravenous  Postoperative Plan-     Informed Consent- Anesthetic plan and risks discussed with patient

## 2018-11-07 NOTE — OP NOTE
OPERATIVE REPORT  PATIENT NAME: Allie Maciel    :  8/10/1924  MRN: [id number]  Pt Location:  GI ROOM 01    ESOPHAGOGASTRODUODENOSCOPY    PROCEDURE: EGD biospy, cautery of avm and endoclip application    SEDATION: Monitored anesthesia care, check anesthesia records    ASA Class: 3    INDICATIONS: Follow up Bleeding Gastric Ulcer     CONSENT:  Informed consent was obtained for the procedure, including sedation after explaining the risks and benefits of the procedure  Risks including but not limited to bleeding, perforation, infection, and missed lesion  PREPARATION:   Telemetry, pulse oximetry, blood pressure were monitored throughout the procedure  Patient was identified by myself both verbally and by visual inspection of ID band  DESCRIPTION:   Patient was placed in the left lateral decubitus position and was sedated with the above medication  The gastroscope was introduced in to the oropharynx and the esophagus was intubated under direct visualization  Scope was passed down the esophagus up to 2nd part of the duodenum  A careful inspection was made as the gastroscope was withdrawn, including a retroflexed view of the stomach; findings and interventions are described below  SPECIMENS TAKEN:      Frances test - antral biopsy     ESTIMATED BLOOD LOSS:      10 ml       FINDINGS:    #1  Esophagus- Mild Candida esophagitis - proximal esophagus, small sliding hiatal hernia ( 2 cm )      #2  Stomach- Bleeding avm - ( oozing with contact ) cauterized with bipolar device - 15 J - then 1 clipped applied      #3  Duodenum- Normal bulb and d-2         IMPRESSIONS:      See above      RECOMMENDATIONS:     Check frances test   Office visit 4-8 weeks  Check cbc  Same Medications   Add Nystatin swish and swallow 5 ml 4 times per day for 10 days     COMPLICATIONS:  None; patient tolerated the procedure well            DISPOSITION: PACU           CONDITION: Stable    SIGNATURE: Cha Guzman MD  DATE: 2018  TIME: 11:44 AM

## 2018-11-08 LAB — UREASE TISS QL: NEGATIVE

## 2018-11-19 ENCOUNTER — APPOINTMENT (OUTPATIENT)
Dept: LAB | Facility: HOSPITAL | Age: 83
End: 2018-11-19
Attending: INTERNAL MEDICINE
Payer: MEDICARE

## 2018-11-19 RX ORDER — LAMOTRIGINE 25 MG/1
250 TABLET ORAL ONCE
Status: COMPLETED | OUTPATIENT
Start: 2018-11-21 | End: 2018-11-21

## 2018-11-21 ENCOUNTER — HOSPITAL ENCOUNTER (OUTPATIENT)
Dept: INFUSION CENTER | Facility: HOSPITAL | Age: 83
Discharge: HOME | End: 2018-11-21
Payer: MEDICARE

## 2018-11-21 VITALS
OXYGEN SATURATION: 94 % | HEART RATE: 87 BPM | DIASTOLIC BLOOD PRESSURE: 85 MMHG | RESPIRATION RATE: 18 BRPM | SYSTOLIC BLOOD PRESSURE: 152 MMHG | TEMPERATURE: 98.1 F

## 2018-11-21 PROCEDURE — 96402 CHEMO HORMON ANTINEOPL SQ/IM: CPT

## 2018-11-21 RX ADMIN — FULVESTRANT 250 MG: 50 INJECTION INTRAMUSCULAR at 10:56

## 2018-12-03 ENCOUNTER — REMOTE DEVICE CLINIC VISIT (OUTPATIENT)
Dept: CARDIOLOGY CLINIC | Facility: CLINIC | Age: 83
End: 2018-12-03
Payer: MEDICARE

## 2018-12-03 DIAGNOSIS — I44.2 AV BLOCK, COMPLETE (HCC): Primary | ICD-10-CM

## 2018-12-03 DIAGNOSIS — E78.2 MIXED HYPERLIPIDEMIA: ICD-10-CM

## 2018-12-03 DIAGNOSIS — Z95.0 CARDIAC PACEMAKER IN SITU: ICD-10-CM

## 2018-12-03 DIAGNOSIS — I10 ESSENTIAL HYPERTENSION: ICD-10-CM

## 2018-12-03 DIAGNOSIS — K21.00 GASTROESOPHAGEAL REFLUX DISEASE WITH ESOPHAGITIS: ICD-10-CM

## 2018-12-03 PROCEDURE — 93296 REM INTERROG EVL PM/IDS: CPT | Performed by: INTERNAL MEDICINE

## 2018-12-03 PROCEDURE — 93294 REM INTERROG EVL PM/LDLS PM: CPT | Performed by: INTERNAL MEDICINE

## 2018-12-03 RX ORDER — PRAVASTATIN SODIUM 20 MG
20 TABLET ORAL DAILY
Qty: 90 TABLET | Refills: 2 | Status: SHIPPED | OUTPATIENT
Start: 2018-12-03 | End: 2019-03-06 | Stop reason: SDUPTHER

## 2018-12-03 RX ORDER — LOSARTAN POTASSIUM 50 MG/1
50 TABLET ORAL DAILY
Qty: 90 TABLET | Refills: 2 | Status: SHIPPED | OUTPATIENT
Start: 2018-12-03 | End: 2019-06-11 | Stop reason: SDUPTHER

## 2018-12-03 RX ORDER — METOPROLOL TARTRATE 50 MG/1
50 TABLET, FILM COATED ORAL 2 TIMES DAILY
Qty: 180 TABLET | Refills: 2 | Status: SHIPPED | OUTPATIENT
Start: 2018-12-03 | End: 2019-03-06 | Stop reason: SDUPTHER

## 2018-12-03 RX ORDER — PANTOPRAZOLE SODIUM 40 MG/1
40 TABLET, DELAYED RELEASE ORAL
Qty: 180 TABLET | Refills: 2 | Status: SHIPPED | OUTPATIENT
Start: 2018-12-03 | End: 2019-06-11 | Stop reason: SDUPTHER

## 2018-12-04 NOTE — PROGRESS NOTES
Results for orders placed or performed in visit on 12/03/18   Cardiac EP device report    Narrative    MDT DUAL CHAMBER PACEMAKER  CARELINK TRANSMISSION: BATTERY VOLTAGE ADEQUATE (10 5 YRS)  AP-67%, >99% (>40% CHB)  ALL AVAILABLE LEAD PARAMETERS WITHIN NORMAL LIMITS  NO SIGNIFICANT HIGH RATE EPISODES  NORMAL DEVICE FUNCTION   GV

## 2018-12-06 ENCOUNTER — APPOINTMENT (OUTPATIENT)
Dept: LAB | Facility: HOSPITAL | Age: 83
End: 2018-12-06
Attending: INTERNAL MEDICINE
Payer: MEDICARE

## 2018-12-06 ENCOUNTER — TRANSCRIBE ORDERS (OUTPATIENT)
Dept: ADMINISTRATIVE | Facility: HOSPITAL | Age: 83
End: 2018-12-06

## 2018-12-06 ENCOUNTER — OFFICE VISIT (OUTPATIENT)
Dept: SURGICAL ONCOLOGY | Facility: HOSPITAL | Age: 83
End: 2018-12-06
Payer: MEDICARE

## 2018-12-06 VITALS
WEIGHT: 103.4 LBS | BODY MASS INDEX: 23.93 KG/M2 | HEIGHT: 55 IN | DIASTOLIC BLOOD PRESSURE: 76 MMHG | TEMPERATURE: 96.8 F | HEART RATE: 68 BPM | SYSTOLIC BLOOD PRESSURE: 130 MMHG | RESPIRATION RATE: 14 BRPM

## 2018-12-06 DIAGNOSIS — K92.2 ACUTE GASTROINTESTINAL HEMORRHAGE: ICD-10-CM

## 2018-12-06 DIAGNOSIS — Z79.818 USE OF FULVESTRANT (FASLODEX): ICD-10-CM

## 2018-12-06 DIAGNOSIS — C50.411 MALIGNANT NEOPLASM OF UPPER-OUTER QUADRANT OF RIGHT BREAST IN FEMALE, ESTROGEN RECEPTOR POSITIVE (HCC): ICD-10-CM

## 2018-12-06 DIAGNOSIS — K92.1 BLOOD IN STOOL: ICD-10-CM

## 2018-12-06 DIAGNOSIS — Z17.0 MALIGNANT NEOPLASM OF UPPER-OUTER QUADRANT OF RIGHT BREAST IN FEMALE, ESTROGEN RECEPTOR POSITIVE (HCC): ICD-10-CM

## 2018-12-06 DIAGNOSIS — C79.2 CANCER, METASTATIC TO SKIN (HCC): Primary | ICD-10-CM

## 2018-12-06 DIAGNOSIS — K92.2 ACUTE GASTROINTESTINAL HEMORRHAGE: Primary | ICD-10-CM

## 2018-12-06 LAB
BASOPHILS # BLD AUTO: 0.05 THOUSANDS/ΜL (ref 0–0.1)
BASOPHILS NFR BLD AUTO: 1 % (ref 0–1)
EOSINOPHIL # BLD AUTO: 0.34 THOUSAND/ΜL (ref 0–0.61)
EOSINOPHIL NFR BLD AUTO: 6 % (ref 0–6)
ERYTHROCYTE [DISTWIDTH] IN BLOOD BY AUTOMATED COUNT: 15.2 % (ref 11.6–15.1)
FERRITIN SERPL-MCNC: 72 NG/ML (ref 8–388)
HCT VFR BLD AUTO: 37.1 % (ref 34.8–46.1)
HGB BLD-MCNC: 12.2 G/DL (ref 11.5–15.4)
IMM GRANULOCYTES # BLD AUTO: 0.01 THOUSAND/UL (ref 0–0.2)
IMM GRANULOCYTES NFR BLD AUTO: 0 % (ref 0–2)
IRON SATN MFR SERPL: 24 %
IRON SERPL-MCNC: 62 UG/DL (ref 50–170)
LYMPHOCYTES # BLD AUTO: 1.74 THOUSANDS/ΜL (ref 0.6–4.47)
LYMPHOCYTES NFR BLD AUTO: 30 % (ref 14–44)
MCH RBC QN AUTO: 29.6 PG (ref 26.8–34.3)
MCHC RBC AUTO-ENTMCNC: 32.9 G/DL (ref 31.4–37.4)
MCV RBC AUTO: 90 FL (ref 82–98)
MONOCYTES # BLD AUTO: 0.84 THOUSAND/ΜL (ref 0.17–1.22)
MONOCYTES NFR BLD AUTO: 15 % (ref 4–12)
NEUTROPHILS # BLD AUTO: 2.78 THOUSANDS/ΜL (ref 1.85–7.62)
NEUTS SEG NFR BLD AUTO: 48 % (ref 43–75)
PLATELET # BLD AUTO: 182 THOUSANDS/UL (ref 149–390)
PMV BLD AUTO: 8.9 FL (ref 8.9–12.7)
RBC # BLD AUTO: 4.12 MILLION/UL (ref 3.81–5.12)
TIBC SERPL-MCNC: 260 UG/DL (ref 250–450)
WBC # BLD AUTO: 5.76 THOUSAND/UL (ref 4.31–10.16)

## 2018-12-06 PROCEDURE — 36415 COLL VENOUS BLD VENIPUNCTURE: CPT

## 2018-12-06 PROCEDURE — 82728 ASSAY OF FERRITIN: CPT

## 2018-12-06 PROCEDURE — 99214 OFFICE O/P EST MOD 30 MIN: CPT | Performed by: SURGERY

## 2018-12-06 PROCEDURE — 83540 ASSAY OF IRON: CPT

## 2018-12-06 PROCEDURE — 83550 IRON BINDING TEST: CPT

## 2018-12-06 PROCEDURE — 85025 COMPLETE CBC W/AUTO DIFF WBC: CPT

## 2018-12-06 NOTE — PROGRESS NOTES
Surgical Oncology Follow Up       [place] [address]  [facility] [address]  8/10/1924  [id number]  [address] SURGICAL ONCOLOGY [facility]  [address] [address] [address]    Chief Complaint   Patient presents with    Breast Cancer     3 month follow up        Assessment/Plan   Diagnoses and all orders for this visit:    Cancer, metastatic to skin [facility])    Malignant neoplasm of upper-outer quadrant of right breast in female, estrogen receptor positive ([facility] [address] )    Use of fulvestrant (Faslodex)        Advance Care Planning/Advance Directives:  Discussed disease status, cancer treatment plans and/or cancer treatment goals with the patient  Oncology History:       Malignant neoplasm of upper-outer quadrant of right breast in female, estrogen receptor positive ([facility] [address] )    11/13/2009 Surgery     Right breast lumpectomy, SLNB, AND         2010 -  Radiation     WBRT,  [place]         8/31/2017 Initial Diagnosis     Malignant neoplasm of upper-outer quadrant of right breast in female, estrogen receptor positive ([facility] [address] )        Hormone Therapy     Arimidex X 5 years            History of Present Illness:   Breast cancer follow-up  -Interval History:  None    Review of Systems:  Review of Systems   Constitutional: Positive for appetite change, fatigue and unexpected weight change (weight loss)  Negative for fever  Eyes: Negative  Respiratory: Negative for shortness of breath  Cardiovascular: Negative  Gastrointestinal:        Reflux   Endocrine: Negative  Genitourinary: Negative  Musculoskeletal: Negative  Negative for arthralgias and myalgias  Skin: Negative  Allergic/Immunologic: Negative  Neurological: Negative  Hematological: Negative  Negative for adenopathy  Does not bruise/bleed easily     Psychiatric/Behavioral: Negative          Patient Active Problem List   Diagnosis    Adjustment disorder with anxiety    Complete heart block (HCC)    GERD without esophagitis    Hyperglycemia    Hyperlipidemia    Essential hypertension    Impingement syndrome of left shoulder    Malignant neoplasm of upper-outer quadrant of right breast in female, estrogen receptor positive ([facility] [address] )    Onychomycosis    Osteoporosis    Pruritus    Tinea pedis of both feet    Urinary urgency    Esophageal stenosis    Tick bite    Skin lesion of neck    Nausea    Cancer, metastatic to skin ([facility] [address] )    Pseudogout of right knee    Gastroesophageal reflux disease with esophagitis    Contusion of foot    Encounter for screening for osteoporosis    Use of fulvestrant (Faslodex)     Past Medical History:   Diagnosis Date    Cardiac pacemaker     Complete heart block (HCC)     Gastric ulcer with hemorrhage     GERD (gastroesophageal reflux disease)     Hearing loss     Heart block     S/P pacemaker     Herpes zoster     last assessed 7/30/13    Hyperlipidemia     Hypertension     Insomnia     last assessed 7/30/13, resolved 6/16/15    Lyme disease     last assessed 7/1/13    Malignant neoplasm of cervix ([facility] [address] )     last assessed 6/27/12, resolved 2/1/17     Osteoporosis     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 ([facility] [address] )     last assessed 6/27/12    Use of anastrozole (Arimidex)     took x 5yrs, last assessed 11/19/17      Past Surgical History:   Procedure Laterality Date    BREAST BIOPSY  10/05/2009    percutaneous needle core    BREAST SURGERY Right 11/13/2009    Lumpectomy     CARDIAC PACEMAKER PLACEMENT      dual chamber, last assessed 2/26/16    CATARACT EXTRACTION, BILATERAL      DENTAL SURGERY      ESOPHAGOGASTRODUODENOSCOPY N/A 2/3/2017    Procedure: ESOPHAGOGASTRODUODENOSCOPY (EGD): FOREIGN BODY REMOVAL;  Surgeon: Mikie Clayton MD;  Location:  MAIN OR;  Service:     ESOPHAGOGASTRODUODENOSCOPY N/A 8/2/2018    Procedure: ESOPHAGOGASTRODUODENOSCOPY (EGD); Surgeon: Tang Villanueva MD;  Location: [facility];  Service: Gastroenterology    HERNIA REPAIR Right 01/28/2015    Inguinal, with mesh    HYSTERECTOMY      KNEE SURGERY      resolved 1999    LYMPHADENECTOMY  11/13/2009    Axillary     NH ESOPHAGOGASTRODUODENOSCOPY TRANSORAL DIAGNOSTIC N/A 11/7/2018    Procedure: ESOPHAGOGASTRODUODENOSCOPY (EGD); Surgeon: Tang Villanueva MD;  Location: [facility];  Service: Gastroenterology    SENTINEL LYMPH NODE BIOPSY Right 11/13/2009     Family History   Problem Relation Age of Onset    Heart disease Mother     Heart disease Father      Social History     Social History    Marital status:      Spouse name: N/A    Number of children: N/A    Years of education: N/A     Occupational History    Not on file       Social History Main Topics    Smoking status: Former Smoker    Smokeless tobacco: Never Used    Alcohol use No    Drug use: No    Sexual activity: No     Other Topics Concern    Not on file     Social History Narrative    Always uses seat belt        Current Outpatient Prescriptions:     ALTEPLASE IV, Infuse into a venous catheter, Disp: , Rfl:     calcium citrate-vitamin D (CITRACAL+D) 315-200 MG-UNIT per tablet, Take by mouth, Disp: , Rfl:     fulvestrant (FASLODEX) 250 mg/5 mL, Inject 500 mg into a muscle once, Disp: , Rfl:     losartan (COZAAR) 50 mg tablet, Take 1 tablet (50 mg total) by mouth daily, Disp: 90 tablet, Rfl: 2    metoprolol tartrate (LOPRESSOR) 50 mg tablet, Take 1 tablet (50 mg total) by mouth 2 (two) times a day, Disp: 180 tablet, Rfl: 2    Multiple Vitamins-Minerals (CENTRUM SILVER ADULT 50+ PO), Take by mouth, Disp: , Rfl:     nystatin (MYCOSTATIN) 100,000 units/mL suspension, Apply 5 mL (500,000 Units total) to the mouth or throat 4 (four) times a day, Disp: 60 mL, Rfl: 0    pantoprazole (PROTONIX) 40 mg tablet, Take 1 tablet (40 mg total) by mouth 2 (two) times a day before meals, Disp: 180 tablet, Rfl: 2    pravastatin (PRAVACHOL) 20 mg tablet, Take 1 tablet (20 mg total) by mouth daily, Disp: 90 tablet, Rfl: 2    LORazepam (ATIVAN) 0 5 mg tablet, Take 1 tablet (0 5 mg total) by mouth every 6 (six) hours as needed for anxiety for up to 2 days, Disp: 6 tablet, Rfl: 0    Current Facility-Administered Medications:     aluminum-magnesium hydroxide-simethicone (MYLANTA) 200-200-20 mg/5 mL oral suspension 30 mL, 30 mL, Oral, Q4H PRN, Marisabel Sherman MD, 30 mL at 02/10/18 1406    lidocaine viscous (XYLOCAINE) 2 % mucosal solution 15 mL, 15 mL, Swish & Spit, 4x Daily PRN, Marisabel Sherman MD, 15 mL at 02/10/18 1407  No Known Allergies    The following portions of the patient's history were reviewed and updated as appropriate: allergies, current medications, past family history, past medical history, past social history, past surgical history and problem list         Vitals:    12/06/18 1435   BP: 130/76   Pulse: 68   Resp: 14   Temp: (!) 96 8 °F (36 °C)       Physical Exam   Constitutional: She is oriented to person, place, and time  She appears well-developed and well-nourished  HENT:   Head: Normocephalic and atraumatic  Cardiovascular: Normal heart sounds  Pulmonary/Chest: Breath sounds normal  Right breast exhibits skin change (Lumpectomy scar, there is one raised remaining nodule on the outer right breast and one area of discoloration remaining)  Right breast exhibits no inverted nipple, no mass, no nipple discharge and no tenderness  Left breast exhibits no inverted nipple, no mass, no nipple discharge, no skin change and no tenderness  Abdominal: Soft  Lymphadenopathy:        Right axillary: No pectoral and no lateral adenopathy present  Left axillary: No pectoral and no lateral adenopathy present  Right: No supraclavicular adenopathy present  Left: No supraclavicular adenopathy present  Neurological: She is alert and oriented to person, place, and time  Skin: Lesion (Residual nodule left lateral neck) noted  Psychiatric: She has a normal mood and affect  Discussion/Summary:  58-year-old female with a history of right breast carcinoma  She recently had a skin biopsy of the left neck that showed metastatic cancer ER positive, VT positive and her 2-by fish analysis  She also had multiple nodules in the upper outer right breast at that time  On her last examination the color of the right breast nodules improved but were still present  Today she has near resolution of the skin nodules in the right breast   She reports fatigue and weight loss  She states that she is suffering from gastric reflux and using Mylanta for this  I encouraged her to use nutritional supplements as well  She is tolerating her faslodex injection quite well otherwise  I will see her again in six months or sooner should the need arise

## 2018-12-14 RX ORDER — LAMOTRIGINE 25 MG/1
250 TABLET ORAL ONCE
Status: COMPLETED | OUTPATIENT
Start: 2018-12-19 | End: 2018-12-19

## 2018-12-17 ENCOUNTER — APPOINTMENT (OUTPATIENT)
Dept: LAB | Facility: HOSPITAL | Age: 83
End: 2018-12-17
Attending: INTERNAL MEDICINE
Payer: MEDICARE

## 2018-12-19 ENCOUNTER — HOSPITAL ENCOUNTER (OUTPATIENT)
Dept: INFUSION CENTER | Facility: HOSPITAL | Age: 83
Discharge: HOME | End: 2018-12-19
Payer: MEDICARE

## 2018-12-19 VITALS
SYSTOLIC BLOOD PRESSURE: 146 MMHG | RESPIRATION RATE: 18 BRPM | OXYGEN SATURATION: 95 % | HEART RATE: 76 BPM | DIASTOLIC BLOOD PRESSURE: 70 MMHG | TEMPERATURE: 97.9 F

## 2018-12-19 PROCEDURE — 96402 CHEMO HORMON ANTINEOPL SQ/IM: CPT

## 2018-12-19 RX ADMIN — FULVESTRANT 250 MG: 50 INJECTION INTRAMUSCULAR at 11:33

## 2018-12-27 ENCOUNTER — OFFICE VISIT (OUTPATIENT)
Dept: URGENT CARE | Facility: CLINIC | Age: 83
End: 2018-12-27
Payer: MEDICARE

## 2018-12-27 ENCOUNTER — APPOINTMENT (OUTPATIENT)
Dept: RADIOLOGY | Facility: CLINIC | Age: 83
End: 2018-12-27
Payer: MEDICARE

## 2018-12-27 VITALS
SYSTOLIC BLOOD PRESSURE: 142 MMHG | BODY MASS INDEX: 23.84 KG/M2 | OXYGEN SATURATION: 89 % | HEIGHT: 55 IN | TEMPERATURE: 97.7 F | WEIGHT: 103 LBS | HEART RATE: 80 BPM | RESPIRATION RATE: 16 BRPM | DIASTOLIC BLOOD PRESSURE: 80 MMHG

## 2018-12-27 DIAGNOSIS — R05.9 COUGH: ICD-10-CM

## 2018-12-27 DIAGNOSIS — R05.9 COUGH: Primary | ICD-10-CM

## 2018-12-27 DIAGNOSIS — J98.9 REACTIVE AIRWAY DISEASE THAT IS NOT ASTHMA: ICD-10-CM

## 2018-12-27 PROCEDURE — 71046 X-RAY EXAM CHEST 2 VIEWS: CPT

## 2018-12-27 PROCEDURE — G0463 HOSPITAL OUTPT CLINIC VISIT: HCPCS | Performed by: FAMILY MEDICINE

## 2018-12-27 PROCEDURE — 99213 OFFICE O/P EST LOW 20 MIN: CPT | Performed by: FAMILY MEDICINE

## 2018-12-27 RX ORDER — BENZONATATE 100 MG/1
100 CAPSULE ORAL 3 TIMES DAILY PRN
Qty: 20 CAPSULE | Refills: 0 | Status: SHIPPED | OUTPATIENT
Start: 2018-12-27 | End: 2019-09-10 | Stop reason: ALTCHOICE

## 2018-12-27 RX ORDER — FLUTICASONE PROPIONATE 50 MCG
1 SPRAY, SUSPENSION (ML) NASAL DAILY
Refills: 0 | Status: CANCELLED | OUTPATIENT
Start: 2018-12-27

## 2018-12-27 RX ORDER — MONTELUKAST SODIUM 10 MG/1
10 TABLET ORAL
Qty: 15 TABLET | Refills: 0 | Status: SHIPPED | OUTPATIENT
Start: 2018-12-27

## 2018-12-27 NOTE — PROGRESS NOTES
Assessment/Plan:      Diagnoses and all orders for this visit:    Cough  -     XR chest pa & lateral; Future    Reactive airway disease that is not asthma  -     benzonatate (TESSALON PERLES) 100 mg capsule; Take 1 capsule (100 mg total) by mouth 3 (three) times a day as needed for cough  -     montelukast (SINGULAIR) 10 mg tablet; Take 1 tablet (10 mg total) by mouth daily at bedtime    Other orders  -     Cancel: fluticasone (FLONASE) 50 mcg/act nasal spray; 1 spray into each nostril daily          Subjective:     Patient ID: Alberto Mirza is a 80 y o  female  Patient is a 66-year-old female who has been coughing for the last 2 weeks  There does not appear to be any fever  Her initial complaints were related to her throat  Now it appears that she has increased secretions in her sinuses with postnasal drip  Of significance is the fact that her O2 sat was only 89%  It was 95% on her last visit here  Review of Systems   Constitutional: Negative  HENT: Positive for congestion and postnasal drip  Eyes: Negative  Respiratory: Negative  Cardiovascular: Negative  Objective:     Physical Exam   Constitutional: She is oriented to person, place, and time  She appears well-developed and well-nourished  HENT:   Head: Normocephalic and atraumatic  There is no tenderness over the facial sinuses  Eyes: Conjunctivae are normal    Pulmonary/Chest: Effort normal and breath sounds normal    There is a rare wheeze on auscultation  Neurological: She is alert and oriented to person, place, and time  Psychiatric: She has a normal mood and affect

## 2018-12-27 NOTE — PATIENT INSTRUCTIONS
We are treating this as reactive airways  Increase fluids and use meds as written  Follow up with family doctor

## 2019-01-08 ENCOUNTER — OFFICE VISIT (OUTPATIENT)
Dept: FAMILY MEDICINE CLINIC | Facility: HOSPITAL | Age: 84
End: 2019-01-08
Payer: MEDICARE

## 2019-01-08 VITALS
BODY MASS INDEX: 23.42 KG/M2 | HEART RATE: 76 BPM | TEMPERATURE: 98.5 F | HEIGHT: 55 IN | WEIGHT: 101.2 LBS | DIASTOLIC BLOOD PRESSURE: 68 MMHG | SYSTOLIC BLOOD PRESSURE: 164 MMHG

## 2019-01-08 DIAGNOSIS — I10 ESSENTIAL HYPERTENSION: ICD-10-CM

## 2019-01-08 DIAGNOSIS — J20.9 BRONCHITIS, ACUTE, WITH BRONCHOSPASM: Primary | ICD-10-CM

## 2019-01-08 PROCEDURE — 99213 OFFICE O/P EST LOW 20 MIN: CPT | Performed by: FAMILY MEDICINE

## 2019-01-08 RX ORDER — AZITHROMYCIN 250 MG/1
TABLET, FILM COATED ORAL
Qty: 6 TABLET | Refills: 0 | Status: SHIPPED | OUTPATIENT
Start: 2019-01-08 | End: 2019-01-12

## 2019-01-08 NOTE — PROGRESS NOTES
Assessment/Plan:         Diagnoses and all orders for this visit:    Bronchitis, acute, with bronchospasm  Comments:  Start OTC Mucinex  Orders:  -     azithromycin (ZITHROMAX) 250 mg tablet; Take 2 tablets today then 1 tablet daily x 4 days    Essential hypertension          Subjective:      Patient ID: Horacio Kwok is a 80 y o  female  Seen in [address] [place] [address] for respiratory infection, started on Tessalon and Singulair, noting that her O2 was only in the high 80's    Still has some cough with clear stringy sputum  Some is coming from her nose as well  The following portions of the patient's history were reviewed and updated as appropriate: allergies, current medications, past family history, past medical history, past social history, past surgical history and problem list     Review of Systems   Constitutional: Positive for chills, fatigue and unexpected weight change  HENT: Positive for congestion, postnasal drip and sinus pain  Respiratory: Positive for cough and wheezing  Cardiovascular: Negative  Musculoskeletal: Negative  Neurological: Negative for headaches  All other systems reviewed and are negative  Objective:      /68   Pulse 76   Temp 98 5 °F (36 9 °C)   Ht 4' 5" (1 346 m)   Wt 45 9 kg (101 lb 3 2 oz)   BMI 25 33 kg/m²          Physical Exam   Constitutional: She is oriented to person, place, and time  She appears well-developed and well-nourished  Neck: No thyromegaly present  Cardiovascular: Normal rate and regular rhythm  Pulmonary/Chest: She has decreased breath sounds  She has wheezes  She has rhonchi  Musculoskeletal: She exhibits no edema  Neurological: She is oriented to person, place, and time  Psychiatric: She has a normal mood and affect  Her behavior is normal    Nursing note and vitals reviewed

## 2019-01-14 ENCOUNTER — APPOINTMENT (OUTPATIENT)
Dept: LAB | Facility: HOSPITAL | Age: 84
End: 2019-01-14
Attending: INTERNAL MEDICINE
Payer: MEDICARE

## 2019-01-15 ENCOUNTER — OFFICE VISIT (OUTPATIENT)
Dept: FAMILY MEDICINE CLINIC | Facility: HOSPITAL | Age: 84
End: 2019-01-15
Payer: MEDICARE

## 2019-01-15 VITALS
DIASTOLIC BLOOD PRESSURE: 66 MMHG | TEMPERATURE: 97 F | WEIGHT: 100 LBS | HEART RATE: 62 BPM | HEIGHT: 55 IN | BODY MASS INDEX: 23.14 KG/M2 | SYSTOLIC BLOOD PRESSURE: 142 MMHG

## 2019-01-15 DIAGNOSIS — I10 ESSENTIAL HYPERTENSION: ICD-10-CM

## 2019-01-15 DIAGNOSIS — J20.9 BRONCHITIS, ACUTE, WITH BRONCHOSPASM: Primary | ICD-10-CM

## 2019-01-15 PROCEDURE — 99213 OFFICE O/P EST LOW 20 MIN: CPT | Performed by: FAMILY MEDICINE

## 2019-01-15 RX ORDER — LAMOTRIGINE 25 MG/1
250 TABLET ORAL ONCE
Status: COMPLETED | OUTPATIENT
Start: 2019-01-16 | End: 2019-01-16

## 2019-01-15 RX ORDER — AZITHROMYCIN 250 MG/1
250 TABLET, FILM COATED ORAL EVERY 24 HOURS
Qty: 6 TABLET | Refills: 0 | Status: SHIPPED | OUTPATIENT
Start: 2019-01-15 | End: 2019-01-21

## 2019-01-15 NOTE — PROGRESS NOTES
Assessment/Plan:         Diagnoses and all orders for this visit:    Bronchitis, acute, with bronchospasm  -     azithromycin (ZITHROMAX) 250 mg tablet; Take 1 tablet (250 mg total) by mouth every 24 hours for 6 days    Essential hypertension  Comments:  Labile but stable BP control          Subjective:      Patient ID: Aldo Garrett is a 80 y o  female  3 month followup  Feels overall better and hasnt needed to be up at night for coughing  Spitting clear sputum  Not SOB    Appetite is OK overall  The following portions of the patient's history were reviewed and updated as appropriate: allergies, current medications, past family history, past medical history, past social history, past surgical history and problem list     Review of Systems   Constitutional: Negative for unexpected weight change  HENT: Positive for congestion  Eyes: Negative for visual disturbance  Respiratory: Positive for cough and wheezing  Cardiovascular: Negative  Gastrointestinal: Negative  Genitourinary: Negative  Musculoskeletal: Negative  Neurological: Positive for weakness  Hematological: Negative  Psychiatric/Behavioral: Negative  Objective:      /66   Pulse 62   Temp (!) 97 °F (36 1 °C)   Ht 4' 5" (1 346 m)   Wt 45 4 kg (100 lb)   BMI 25 03 kg/m²          Physical Exam   Constitutional: She is oriented to person, place, and time  She appears well-developed and well-nourished  HENT:   Head: Normocephalic and atraumatic  Cardiovascular: Normal rate and regular rhythm  Murmur heard  Pulmonary/Chest: She has decreased breath sounds  Musculoskeletal: She exhibits no edema  Neurological: She is oriented to person, place, and time  Skin: No rash noted  Psychiatric: She has a normal mood and affect  Her behavior is normal  Judgment and thought content normal    Nursing note and vitals reviewed

## 2019-01-16 ENCOUNTER — HOSPITAL ENCOUNTER (OUTPATIENT)
Dept: INFUSION CENTER | Facility: HOSPITAL | Age: 84
Discharge: HOME | End: 2019-01-16
Payer: MEDICARE

## 2019-01-16 ENCOUNTER — OFFICE VISIT (OUTPATIENT)
Dept: HEMATOLOGY ONCOLOGY | Facility: HOSPITAL | Age: 84
End: 2019-01-16
Payer: MEDICARE

## 2019-01-16 VITALS
HEART RATE: 78 BPM | RESPIRATION RATE: 16 BRPM | HEIGHT: 55 IN | WEIGHT: 99 LBS | TEMPERATURE: 97.7 F | DIASTOLIC BLOOD PRESSURE: 72 MMHG | OXYGEN SATURATION: 92 % | BODY MASS INDEX: 22.91 KG/M2 | SYSTOLIC BLOOD PRESSURE: 118 MMHG

## 2019-01-16 DIAGNOSIS — Z17.0 MALIGNANT NEOPLASM OF UPPER-OUTER QUADRANT OF RIGHT BREAST IN FEMALE, ESTROGEN RECEPTOR POSITIVE (HCC): Primary | ICD-10-CM

## 2019-01-16 DIAGNOSIS — C50.411 MALIGNANT NEOPLASM OF UPPER-OUTER QUADRANT OF RIGHT BREAST IN FEMALE, ESTROGEN RECEPTOR POSITIVE (HCC): Primary | ICD-10-CM

## 2019-01-16 PROCEDURE — 96402 CHEMO HORMON ANTINEOPL SQ/IM: CPT

## 2019-01-16 PROCEDURE — 99214 OFFICE O/P EST MOD 30 MIN: CPT | Performed by: INTERNAL MEDICINE

## 2019-01-16 RX ADMIN — FULVESTRANT 250 MG: 50 INJECTION INTRAMUSCULAR at 11:22

## 2019-01-16 NOTE — PROGRESS NOTES
Hematology/Oncology Outpatient Follow- up Note  Paola Tello 80 y o  female MRN: @ Encounter: [id number]        Date:  1/16/2019    Presenting Complaint/Diagnosis : Metastatic breast cancer    HPI:    Radhames Meléndez is seen for initial consultation 7/30/2018 regarding Newly diagnosed metastatic breast cancer  Per the record she was diagnosed with a T2 N3 M0 right-sided breast cancer in 2009  She had a lumpectomy and axillary dissection in November 2009 followed by hormonal therapy and radiation  The patient herself does not remember being on hormonal therapy but per the records she was on this  Regardless more recently she was found to have some lesions An outside facility where she had a biopsy done of the skin lesion in her left neck which actually revealed metastatic carcinoma  It was consistent with her breast primary but estrogen staining and HER-2 staining was not done and we will order this  A CT scan was done which did not show major visceral disease  She does however have lesions on her right breast        Previous Hematologic/ Oncologic History:       Malignant neoplasm of upper-outer quadrant of right breast in female, estrogen receptor positive ([address] )    11/13/2009 Surgery     Right breast lumpectomy, SLNB, AND         2010 -  Radiation     WBRT,  [place]         8/31/2017 Initial Diagnosis     Malignant neoplasm of upper-outer quadrant of right breast in female, estrogen receptor positive ([address] )        Hormone Therapy     Arimidex X 5 years            Current Hematologic/ Oncologic Treatment:    Faslodex    Interval History:    The patient returns for follow-up visit  Repeat testing on her biopsy did reveal that the tumor was positive for estrogen receptor  HER-2 was negative  AK was positive  She has been started on Faslodex  She states she is doing well  The nodules In her breast have shrunk  As far as symptoms are concerned she denies any nausea denies any vomiting  Denies any abdominal pain  Overall has no issues with the Faslodex  She does have a cold  She states she is currently on antibiotics which were prescribed by her primary care physician yesterday  States she has been a little dehydrated because of which she was slightly orthostatic this morning  Denies any persistent nausea or vomiting or focal neurological signs  Denies any persistent headache or dizziness  Her 14 point review of systems today was otherwise negative  She did have an EGD since she last saw me which showed a bleeding AVM  There was also Candida esophagitis  Her tumor marker continues to run within normal limits although it used to be elevated in 2018  Since she is having a nice response  Your AST is very slightly elevated at 56 but ALT and the rest of her liver function tests are within normal limits  She has no right upper quadrant complaints  Cancer Staging:  Cancer Staging  Malignant neoplasm of upper-outer quadrant of right breast in female, estrogen receptor positive ([facility] [address] )  Staging form: Breast, AJCC 7th Edition  - Pathologic: Stage IIIC (T2, N3, cM0) - Signed by Crystal Krishna MD on 7/19/2018  - Pathologic: Stage IV (M1) - Signed by Crystal Krishna MD on 9/6/2018      Test Results:    Imaging: Xr Chest Pa & Lateral    Result Date: 12/27/2018  Narrative: CHEST INDICATION:   R05: Cough  Worsening nonproductive cough  COMPARISON:  None EXAM PERFORMED/VIEWS:  XR CHEST PA & LATERAL  The frontal view was performed utilizing dual energy radiographic technique  Images: 4 FINDINGS: The heart is normal in size  Atherosclerotic changes in the aorta  Dual lead pacing device  Lungs are hyperinflated and clear  Bilateral apical pleural thickening and scarring, right side greater than left  This appears stable  No focal pneumonia  Osseous structures appear within normal limits for patient age  Impression: Stable appearance of the chest  No acute cardiopulmonary disease   Workstation performed: [id number]       Labs:   Lab Results   Component Value Date    WBC 8 81 01/14/2019    HGB 11 9 01/14/2019    HCT 37 5 01/14/2019    MCV 93 01/14/2019     01/14/2019     Lab Results   Component Value Date     07/13/2015    K 4 4 01/14/2019     01/14/2019    CO2 32 01/14/2019    ANIONGAP 7 07/13/2015    BUN 17 01/14/2019    CREATININE 0 96 01/14/2019    GLUCOSE 96 07/13/2015    GLUF 59 (L) 12/17/2018    CALCIUM 8 6 01/14/2019    AST 56 (H) 01/14/2019    ALT 36 01/14/2019    ALKPHOS 74 01/14/2019    PROT 7 7 07/13/2015    BILITOT 0 7 07/13/2015    EGFR 51 01/14/2019       Lab Results   Component Value Date    IRON 62 12/06/2018    TIBC 260 12/06/2018    FERRITIN 72 12/06/2018     ROS: As stated in the history of present illness otherwise his 14 point review of systems today was negative        Active Problems:   Patient Active Problem List   Diagnosis    Adjustment disorder with anxiety    Complete heart block (HCC)    GERD without esophagitis    Hyperglycemia    Hyperlipidemia    Essential hypertension    Impingement syndrome of left shoulder    Malignant neoplasm of upper-outer quadrant of right breast in female, estrogen receptor positive ([facility] [address] )    Onychomycosis    Osteoporosis    Pruritus    Tinea pedis of both feet    Urinary urgency    Esophageal stenosis    Tick bite    Skin lesion of neck    Nausea    Cancer, metastatic to skin (HCC)    Pseudogout of right knee    Gastroesophageal reflux disease with esophagitis    Contusion of foot    Encounter for screening for osteoporosis    Use of fulvestrant (Faslodex)    Bronchitis, acute, with bronchospasm       Past Medical History:   Past Medical History:   Diagnosis Date    Cardiac pacemaker     Complete heart block (HCC)     Gastric ulcer with hemorrhage     GERD (gastroesophageal reflux disease)     Hearing loss     Heart block     S/P pacemaker     Herpes zoster     last assessed 7/30/13    Hyperlipidemia     Hypertension     Insomnia last assessed 7/30/13, resolved 6/16/15    Lyme disease     last assessed 7/1/13    Malignant neoplasm of cervix ([facility] [address] )     last assessed 6/27/12, resolved 2/1/17     Osteoporosis     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 [facility] )     last assessed 6/27/12    Use of anastrozole (Arimidex)     took x 5yrs, last assessed 11/19/17        Surgical History:   Past Surgical History:   Procedure Laterality Date    BREAST BIOPSY  10/05/2009    percutaneous needle core    BREAST SURGERY Right 11/13/2009    Lumpectomy     CARDIAC PACEMAKER PLACEMENT      dual chamber, last assessed 2/26/16    CATARACT EXTRACTION, BILATERAL      DENTAL SURGERY      ESOPHAGOGASTRODUODENOSCOPY N/A 2/3/2017    Procedure: ESOPHAGOGASTRODUODENOSCOPY (EGD): FOREIGN BODY REMOVAL;  Surgeon: Ditpi Do MD;  [facility];  Service:     ESOPHAGOGASTRODUODENOSCOPY N/A 8/2/2018    Procedure: ESOPHAGOGASTRODUODENOSCOPY (EGD); Surgeon: Dipti Do MD;  [facility];  Service: Gastroenterology    HERNIA REPAIR Right 01/28/2015    Inguinal, with mesh    HYSTERECTOMY      KNEE SURGERY      resolved 1999    LYMPHADENECTOMY  11/13/2009    Axillary     MO ESOPHAGOGASTRODUODENOSCOPY TRANSORAL DIAGNOSTIC N/A 11/7/2018    Procedure: ESOPHAGOGASTRODUODENOSCOPY (EGD); Surgeon: Dipti Do MD;  [facility];  Service: Gastroenterology    SENTINEL LYMPH NODE BIOPSY Right 11/13/2009       Family History:    Family History   Problem Relation Age of Onset    Heart disease Mother     Heart disease Father        Social History:   Social History     Social History    Marital status:      Spouse name: N/A    Number of children: N/A    Years of education: N/A     Occupational History    Not on file       Social History Main Topics    Smoking status: Former Smoker    Smokeless tobacco: Never Used    Alcohol use No    Drug use: No    Sexual activity: No     Other Topics Concern    Not on file     Social History Narrative    Always uses seat belt        Current Medications:   Current Outpatient Prescriptions   Medication Sig Dispense Refill    ALTEPLASE IV Infuse into a venous catheter      azithromycin (ZITHROMAX) 250 mg tablet Take 1 tablet (250 mg total) by mouth every 24 hours for 6 days 6 tablet 0    benzonatate (TESSALON PERLES) 100 mg capsule Take 1 capsule (100 mg total) by mouth 3 (three) times a day as needed for cough 20 capsule 0    calcium citrate-vitamin D (CITRACAL+D) 315-200 MG-UNIT per tablet Take by mouth      fulvestrant (FASLODEX) 250 mg/5 mL Inject 500 mg into a muscle once      losartan (COZAAR) 50 mg tablet Take 1 tablet (50 mg total) by mouth daily 90 tablet 2    metoprolol tartrate (LOPRESSOR) 50 mg tablet Take 1 tablet (50 mg total) by mouth 2 (two) times a day 180 tablet 2    montelukast (SINGULAIR) 10 mg tablet Take 1 tablet (10 mg total) by mouth daily at bedtime 15 tablet 0    Multiple Vitamins-Minerals (CENTRUM SILVER ADULT 50+ PO) Take by mouth      nystatin (MYCOSTATIN) 100,000 units/mL suspension Apply 5 mL (500,000 Units total) to the mouth or throat 4 (four) times a day 60 mL 0    pantoprazole (PROTONIX) 40 mg tablet Take 1 tablet (40 mg total) by mouth 2 (two) times a day before meals 180 tablet 2    pravastatin (PRAVACHOL) 20 mg tablet Take 1 tablet (20 mg total) by mouth daily 90 tablet 2    LORazepam (ATIVAN) 0 5 mg tablet Take 1 tablet (0 5 mg total) by mouth every 6 (six) hours as needed for anxiety for up to 2 days 6 tablet 0     Current Facility-Administered Medications   Medication Dose Route Frequency Provider Last Rate Last Dose    aluminum-magnesium hydroxide-simethicone (MYLANTA) 200-200-20 mg/5 mL oral suspension 30 mL  30 mL Oral Q4H PRN Jeet Valero MD   30 mL at 02/10/18 1406    lidocaine viscous (XYLOCAINE) 2 % mucosal solution 15 mL  15 mL Swish & Spit 4x Daily PRN Jeet Valero MD   15 mL at 02/10/18 140     Facility-Administered Medications Ordered in Other Visits Medication Dose Route Frequency Provider Last Rate Last Dose    alteplase (CATHFLO) injection 2 mg  2 mg Intracatheter PRN [address] MD        fulvestrant (FASLODEX) IM injection 250 mg  250 mg Intramuscular Once [address], MD        And    fulvestrant (FASLODEX) IM injection 250 mg  250 mg Intramuscular Once [address] Margo Knox MD           Allergies: No Known Allergies    Physical Exam:    Body surface area is 1 27 meters squared  Wt Readings from Last 3 Encounters:   01/16/19 44 9 kg (99 lb)   01/15/19 45 4 kg (100 lb)   01/08/19 45 9 kg (101 lb 3 2 oz)        Temp Readings from Last 3 Encounters:   01/16/19 97 7 °F (36 5 °C) (Tympanic)   01/15/19 (!) 97 °F (36 1 °C)   01/08/19 98 5 °F (36 9 °C)        BP Readings from Last 3 Encounters:   01/16/19 118/72   01/15/19 142/66   01/08/19 164/68         Pulse Readings from Last 3 Encounters:   01/16/19 78   01/15/19 62   01/08/19 76      Physical Exam     Constitutional   General appearance: No acute distress, well appearing and well nourished  Eyes   Conjunctiva and lids: No swelling, erythema or discharge  Pupils and irises: Equal, round and reactive to light  Ears, Nose, Mouth, and Throat   External inspection of ears and nose: Normal     Nasal mucosa, septum, and turbinates: Normal without edema or erythema  Oropharynx: Normal with no erythema, edema, exudate or lesions  Pulmonary   Respiratory effort: No increased work of breathing or signs of respiratory distress  Auscultation of lungs: Clear to auscultation  Cardiovascular   Palpation of heart: Normal PMI, no thrills  Auscultation of heart: Normal rate and rhythm, normal S1 and S2, without murmurs  Examination of extremities for edema and/or varicosities: Normal     Carotid pulses: Normal     Abdomen   Abdomen: Non-tender, no masses  Liver and spleen: No hepatomegaly or splenomegaly  Lymphatic   Palpation of lymph nodes in neck: No lymphadenopathy      Musculoskeletal   Gait and station: Normal     Digits and nails: Normal without clubbing or cyanosis  Inspection/palpation of joints, bones, and muscles: Normal     Skin   Skin and subcutaneous tissue: Normal without rashes or lesions  Neurologic   Cranial nerves: Cranial nerves 2-12 intact  Sensation: No sensory loss  Psychiatric   Orientation to person, place, and time: Normal     Mood and affect: Normal         Assessment / Plan:      The patient is a pleasant 79-year-old female with recurrence of her breast cancer  She has ER/DE positive disease  We started her on Faslodex  She is tolerating it well  She seems to be having a clinical response  We will continue her on therapy  I'll see her back in 4 months  She will get monthly Faslodex  She is going to try to keep herself better hydrated  She will call her primary care physician if she has any fevers or signs of worsening of her bronchitis  Goals and Barriers:  Current Goal:  Prolong Survival from Breast cancer  Barriers: None  Patient's Capacity to Self Care:  Patient  able to self care  Portions of the record may have been created with voice recognition software   Occasional wrong word or "sound a like" substitutions may have occurred due to the inherent limitations of voice recognition software   Read the chart carefully and recognize, using context, where substitutions have occurred

## 2019-01-25 ENCOUNTER — OFFICE VISIT (OUTPATIENT)
Dept: FAMILY MEDICINE CLINIC | Facility: HOSPITAL | Age: 84
End: 2019-01-25
Payer: MEDICARE

## 2019-01-25 VITALS
TEMPERATURE: 97.4 F | HEART RATE: 70 BPM | SYSTOLIC BLOOD PRESSURE: 144 MMHG | DIASTOLIC BLOOD PRESSURE: 60 MMHG | WEIGHT: 97.8 LBS | HEIGHT: 55 IN | BODY MASS INDEX: 22.63 KG/M2

## 2019-01-25 DIAGNOSIS — I10 ESSENTIAL HYPERTENSION: ICD-10-CM

## 2019-01-25 DIAGNOSIS — J20.9 BRONCHITIS, ACUTE, WITH BRONCHOSPASM: Primary | ICD-10-CM

## 2019-01-25 PROCEDURE — 99213 OFFICE O/P EST LOW 20 MIN: CPT | Performed by: FAMILY MEDICINE

## 2019-01-25 RX ORDER — PREDNISONE 20 MG/1
40 TABLET ORAL DAILY
Qty: 8 TABLET | Refills: 0 | Status: SHIPPED | OUTPATIENT
Start: 2019-01-25 | End: 2019-09-10 | Stop reason: ALTCHOICE

## 2019-01-25 NOTE — PROGRESS NOTES
Assessment/Plan:         Diagnoses and all orders for this visit:    Bronchitis, acute, with bronchospasm  Comments:  Clinically improving but with fatigue  CBC and CMP from 1/14/19 were WNL  Add Prednisone to speed resolution of inflammation in lungs  Orders:  -     predniSONE 20 mg tablet; Take 2 tablets (40 mg total) by mouth daily    Essential hypertension          Subjective:      Patient ID: Ramana Calabrese is a 80 y o  female  Follow up 10 days  Completed Azithromycin and tried some Mucinex but it upset her stomach    Not coughing as much but still is so tired  So days pretty good, other days no energy at all  Sputum is slightly yellow but not as much  Not SOB and not wheezy sounding  Appetite is not great  The following portions of the patient's history were reviewed and updated as appropriate: allergies, current medications, past family history, past medical history, past social history, past surgical history and problem list     Review of Systems   Constitutional: Positive for fatigue and unexpected weight change  Negative for fever  HENT: Negative for congestion and sore throat  Respiratory: Positive for cough  Negative for choking, chest tightness and shortness of breath  Cardiovascular: Negative  Objective:      /60   Pulse 70   Temp (!) 97 4 °F (36 3 °C)   Ht 4' 5" (1 346 m)   Wt 44 4 kg (97 lb 12 8 oz)   BMI 24 48 kg/m²          Physical Exam   Constitutional: She is oriented to person, place, and time  She appears well-developed and well-nourished  Cardiovascular: Regular rhythm  Pulmonary/Chest: She has decreased breath sounds  She has no wheezes  She has no rhonchi  She has no rales  Musculoskeletal: She exhibits no edema  Neurological: She is oriented to person, place, and time  Psychiatric: She has a normal mood and affect  Nursing note and vitals reviewed

## 2019-02-02 ENCOUNTER — TELEPHONE (OUTPATIENT)
Dept: OTHER | Facility: OTHER | Age: 84
End: 2019-02-02

## 2019-02-02 NOTE — TELEPHONE ENCOUNTER
Sree Rater 8/10/1924  CONFIDENTIALTY NOTICE: This fax transmission is intended only for the addressee  It contains information that is legally privileged,  confidential or otherwise protected from use or disclosure  If you are not the intended recipient, you are strictly prohibited from reviewing,  disclosing, copying using or disseminating any of this information or taking any action in reliance on or regarding this information  If you have  received this fax in error, please notify us immediately by telephone so that we can arrange for its return to us  Page:   Call Id: [id number]  Health Call  Standard Call Report  Health Call  Patient Name: Sree Rater  Gender: Female  : 8/10/1924  Age: 80 Y 11 M 23 D  Return Phone  Number: [phone] (Home)  Address: [address] [place] [address]/[facility]/Zip: [facility] [address]  Practice Name: [address] [place] [address]  Practice Charged:  Physician:  David Robertson Name:  Relationship To  Patient: Self  Return Phone Number: [phone] (Home)  Presenting Problem: "I have loose cough "  Service Type: Messages  Charged Service 1: Messages  Pharmacy Name and  Number:  Nurse Assessment  Protocols  Protocol Title Nurse Date/Time  Disp  Time Disposition Final User  2019 11:24:11 AM Send to Follow Up Christie Kwok RN, Annette  2019 12:05:14 PM Send to Follow Up Christie Kwok RN, Freya Dapper  2019 12:22:01 PM Send to Follow Up Christie Kwok RN, Freya Dapper  2019 12:43:20 PM Close Yes Charmaine Brannon  Comments  User: Jean Pierre Donnelly RN Date/Time: 2019 11:23:56 AM  Asked me to call her back in five minutes  User: Jean Pierre Donnelly RN Date/Time: 2019 12:04:58 PM  Called back and no answer  Left   Will try again in about 15 minutes  User: Jean Pierre Donnelly RN Date/Time: 2019 12:21:51 PM  Called back and brought to   Tried leaving a message but was cut off in the middle of message  Will leave in follow up que  for 20 minutes to see if she calls back  User:  Jean Pierre Donnelly RN Date/Time: 2/2/2019 12:43:04 PM  Call in que for over an hour  No call back from patient  Will close file now

## 2019-02-04 NOTE — TELEPHONE ENCOUNTER
Spoke to patient  She started taking mucinex OTC this weekend and it is helping  No other needs at this time

## 2019-02-07 RX ORDER — LAMOTRIGINE 25 MG/1
250 TABLET ORAL ONCE
Status: COMPLETED | OUTPATIENT
Start: 2019-02-13 | End: 2019-02-13

## 2019-02-09 ENCOUNTER — APPOINTMENT (OUTPATIENT)
Dept: LAB | Facility: HOSPITAL | Age: 84
End: 2019-02-09
Attending: INTERNAL MEDICINE
Payer: MEDICARE

## 2019-02-13 ENCOUNTER — HOSPITAL ENCOUNTER (OUTPATIENT)
Dept: INFUSION CENTER | Facility: HOSPITAL | Age: 84
Discharge: HOME | End: 2019-02-13
Payer: MEDICARE

## 2019-02-13 ENCOUNTER — IN-CLINIC DEVICE VISIT (OUTPATIENT)
Dept: CARDIOLOGY CLINIC | Facility: CLINIC | Age: 84
End: 2019-02-13

## 2019-02-13 VITALS
OXYGEN SATURATION: 96 % | DIASTOLIC BLOOD PRESSURE: 77 MMHG | HEART RATE: 68 BPM | SYSTOLIC BLOOD PRESSURE: 149 MMHG | TEMPERATURE: 97.4 F | RESPIRATION RATE: 18 BRPM

## 2019-02-13 DIAGNOSIS — I44.30 AVB (ATRIOVENTRICULAR BLOCK): Primary | ICD-10-CM

## 2019-02-13 DIAGNOSIS — Z95.0 PRESENCE OF CARDIAC PACEMAKER: ICD-10-CM

## 2019-02-13 PROCEDURE — 99024 POSTOP FOLLOW-UP VISIT: CPT | Performed by: INTERNAL MEDICINE

## 2019-02-13 PROCEDURE — 96402 CHEMO HORMON ANTINEOPL SQ/IM: CPT

## 2019-02-13 RX ADMIN — FULVESTRANT 250 MG: 50 INJECTION INTRAMUSCULAR at 11:38

## 2019-02-13 NOTE — PROGRESS NOTES
Results for orders placed or performed in visit on 02/13/19   Cardiac EP device report    Narrative    [facility] DUE TO MDT ATRIAL SENSING RECALL  BATTERY VOLTAGE ADEQUATE (10 5 YRS)  AP: 64 7%  : 99 8% (>40% DEPENDENT)  ALL LEAD PARAMETERS WITHIN NORMAL LIMITS  NO SIGNIFICANT HIGH RATE EPISODES  PER DR CISNEROS REPROGRAMMED MODE TO DVIR, LRL 80 BPM DUE TO MDT ATRIAL SENSING RECALL  PACEMAKER FUNCTIONING APPROPRIATELY    [address] [place] [address]

## 2019-02-13 NOTE — PLAN OF CARE
Problem: Potential for Falls  Goal: Patient will remain free of falls  Description  INTERVENTIONS:  - Assess patient frequently for physical needs  -  Identify cognitive and physical deficits and behaviors that affect risk of falls    -  [place] fall precautions as indicated by assessment   - Educate patient/family on patient safety including physical limitations  - Instruct patient to call for assistance with activity based on assessment  - Modify environment to reduce risk of injury  - Consider OT/PT consult to assist with strengthening/mobility  Outcome: Progressing

## 2019-02-19 ENCOUNTER — TELEPHONE (OUTPATIENT)
Dept: GASTROENTEROLOGY | Facility: CLINIC | Age: 84
End: 2019-02-19

## 2019-02-19 NOTE — TELEPHONE ENCOUNTER
3-mo recall for CBC & Iron panel w/ Ferritin  Provider:  Dr Brenda Jewell  Previous labs at [facility]

## 2019-03-05 NOTE — TELEPHONE ENCOUNTER
Dr Timothy Dominguez, pt is due for her recall labs in March  CBC, iron panel and ferritin  Noted in the chart she had a CBC along with other labs 2/9/19  Do you still want her to have the iron studies?

## 2019-03-06 DIAGNOSIS — I10 ESSENTIAL HYPERTENSION: ICD-10-CM

## 2019-03-06 DIAGNOSIS — E78.2 MIXED HYPERLIPIDEMIA: ICD-10-CM

## 2019-03-06 RX ORDER — METOPROLOL TARTRATE 50 MG/1
50 TABLET, FILM COATED ORAL 2 TIMES DAILY
Qty: 180 TABLET | Refills: 2 | Status: SHIPPED | OUTPATIENT
Start: 2019-03-06 | End: 2019-06-11 | Stop reason: SDUPTHER

## 2019-03-06 RX ORDER — PRAVASTATIN SODIUM 20 MG
20 TABLET ORAL DAILY
Qty: 90 TABLET | Refills: 2 | Status: SHIPPED | OUTPATIENT
Start: 2019-03-06

## 2019-03-10 RX ORDER — LAMOTRIGINE 25 MG/1
250 TABLET ORAL ONCE
Status: COMPLETED | OUTPATIENT
Start: 2019-03-13 | End: 2019-03-13

## 2019-03-11 ENCOUNTER — APPOINTMENT (OUTPATIENT)
Dept: LAB | Facility: HOSPITAL | Age: 84
End: 2019-03-11
Attending: INTERNAL MEDICINE
Payer: MEDICARE

## 2019-03-13 ENCOUNTER — TELEPHONE (OUTPATIENT)
Dept: HEMATOLOGY ONCOLOGY | Facility: HOSPITAL | Age: 84
End: 2019-03-13

## 2019-03-13 ENCOUNTER — HOSPITAL ENCOUNTER (OUTPATIENT)
Dept: INFUSION CENTER | Facility: HOSPITAL | Age: 84
Discharge: HOME | End: 2019-03-13
Payer: MEDICARE

## 2019-03-13 VITALS
RESPIRATION RATE: 16 BRPM | SYSTOLIC BLOOD PRESSURE: 143 MMHG | OXYGEN SATURATION: 97 % | TEMPERATURE: 97.8 F | HEART RATE: 88 BPM | DIASTOLIC BLOOD PRESSURE: 92 MMHG

## 2019-03-13 PROCEDURE — 96402 CHEMO HORMON ANTINEOPL SQ/IM: CPT

## 2019-03-13 RX ADMIN — FULVESTRANT 250 MG: 50 INJECTION INTRAMUSCULAR at 10:44

## 2019-03-13 RX ADMIN — FULVESTRANT 250 MG: 50 INJECTION INTRAMUSCULAR at 10:45

## 2019-03-13 NOTE — TELEPHONE ENCOUNTER
Called Dr Lila Franklin office, Mark Morley, gave her the message that we will not be ordering labs on her anymore but will be happy to see her again if they feel it is necessary  She will forward the message to Dr Lila Franklin nurse as [place]

## 2019-03-13 NOTE — TELEPHONE ENCOUNTER
Pt left  mssg stating she had a mssg on her machine from Dr Radha Martínez; asks if he would CB again [phone]

## 2019-03-13 NOTE — TELEPHONE ENCOUNTER
Reviewed labs and had normal hb --  No need for iron studies - pt is under care of Dr Eileen Bliss for breast cancer who regularly check labs -- advise pt and dr Lauren Viramontes office that will be on standby to help if hb drops - pt had bleeding avm's in the past in the stomach - but will take her out of our recall labs  Thanks  [employer]

## 2019-03-13 NOTE — TELEPHONE ENCOUNTER
Called pt back, she appreciates not repeating any extra lab work     Please remove any lab recalls, thanks

## 2019-03-13 NOTE — TELEPHONE ENCOUNTER
Dr Brenda Jewell would like to inform the Dr Madisyn Roberto office, that he will discontinue ordering the lab work since Medical Oncology is ordering labs due to current treatment  If patient needs to be seen by the provider Dr Brenda Jewell for any future medical issue, he will gladly see her again

## 2019-03-13 NOTE — PROGRESS NOTES
Pt arrived via w/c for Faslodex injection  States she had a bad hot flash before she left the house  Denies other c/o  Faslodex given IM both gluts, pt abbie well  Dsd applied  Disch via w/c to home with daughter

## 2019-03-22 ENCOUNTER — TELEPHONE (OUTPATIENT)
Dept: FAMILY MEDICINE CLINIC | Facility: HOSPITAL | Age: 84
End: 2019-03-22

## 2019-03-22 NOTE — TELEPHONE ENCOUNTER
There are no Rx tablets but OTC Pericolace has both laxative and stool softener in it  She can take up to 4 tablets a day of that

## 2019-03-22 NOTE — TELEPHONE ENCOUNTER
Pt is having hard stools for 2 weeks  Her normal laxative is not working  She needs a laxative with stool softener  PCB Pt

## 2019-04-04 ENCOUNTER — APPOINTMENT (OUTPATIENT)
Dept: LAB | Facility: HOSPITAL | Age: 84
End: 2019-04-04
Attending: INTERNAL MEDICINE
Payer: MEDICARE

## 2019-04-04 DIAGNOSIS — C50.411 MALIGNANT NEOPLASM OF UPPER-OUTER QUADRANT OF RIGHT BREAST IN FEMALE, ESTROGEN RECEPTOR POSITIVE (HCC): ICD-10-CM

## 2019-04-04 DIAGNOSIS — Z17.0 MALIGNANT NEOPLASM OF UPPER-OUTER QUADRANT OF RIGHT BREAST IN FEMALE, ESTROGEN RECEPTOR POSITIVE (HCC): ICD-10-CM

## 2019-04-04 LAB
ALBUMIN SERPL BCP-MCNC: 3.2 G/DL (ref 3.5–5)
ALP SERPL-CCNC: 59 U/L (ref 46–116)
ALT SERPL W P-5'-P-CCNC: 41 U/L (ref 12–78)
ANION GAP SERPL CALCULATED.3IONS-SCNC: 8 MMOL/L (ref 4–13)
AST SERPL W P-5'-P-CCNC: 57 U/L (ref 5–45)
BASOPHILS # BLD AUTO: 0.06 THOUSANDS/ΜL (ref 0–0.1)
BASOPHILS NFR BLD AUTO: 1 % (ref 0–1)
BILIRUB SERPL-MCNC: 0.5 MG/DL (ref 0.2–1)
BUN SERPL-MCNC: 19 MG/DL (ref 5–25)
CALCIUM SERPL-MCNC: 9.1 MG/DL (ref 8.3–10.1)
CANCER AG27-29 SERPL-ACNC: 27.6 U/ML (ref 0–42.3)
CHLORIDE SERPL-SCNC: 102 MMOL/L (ref 100–108)
CO2 SERPL-SCNC: 30 MMOL/L (ref 21–32)
CREAT SERPL-MCNC: 0.8 MG/DL (ref 0.6–1.3)
EOSINOPHIL # BLD AUTO: 0.41 THOUSAND/ΜL (ref 0–0.61)
EOSINOPHIL NFR BLD AUTO: 6 % (ref 0–6)
ERYTHROCYTE [DISTWIDTH] IN BLOOD BY AUTOMATED COUNT: 14.5 % (ref 11.6–15.1)
GFR SERPL CREATININE-BSD FRML MDRD: 63 ML/MIN/1.73SQ M
GLUCOSE P FAST SERPL-MCNC: 45 MG/DL (ref 65–99)
HCT VFR BLD AUTO: 38.8 % (ref 34.8–46.1)
HGB BLD-MCNC: 12.5 G/DL (ref 11.5–15.4)
IMM GRANULOCYTES # BLD AUTO: 0.04 THOUSAND/UL (ref 0–0.2)
IMM GRANULOCYTES NFR BLD AUTO: 1 % (ref 0–2)
LYMPHOCYTES # BLD AUTO: 1.48 THOUSANDS/ΜL (ref 0.6–4.47)
LYMPHOCYTES NFR BLD AUTO: 22 % (ref 14–44)
MCH RBC QN AUTO: 30.9 PG (ref 26.8–34.3)
MCHC RBC AUTO-ENTMCNC: 32.2 G/DL (ref 31.4–37.4)
MCV RBC AUTO: 96 FL (ref 82–98)
MONOCYTES # BLD AUTO: 1.06 THOUSAND/ΜL (ref 0.17–1.22)
MONOCYTES NFR BLD AUTO: 16 % (ref 4–12)
NEUTROPHILS # BLD AUTO: 3.77 THOUSANDS/ΜL (ref 1.85–7.62)
NEUTS SEG NFR BLD AUTO: 54 % (ref 43–75)
NRBC BLD AUTO-RTO: 0 /100 WBCS
PLATELET # BLD AUTO: 175 THOUSANDS/UL (ref 149–390)
PMV BLD AUTO: 9.5 FL (ref 8.9–12.7)
POTASSIUM SERPL-SCNC: 4.2 MMOL/L (ref 3.5–5.3)
PROT SERPL-MCNC: 7 G/DL (ref 6.4–8.2)
RBC # BLD AUTO: 4.04 MILLION/UL (ref 3.81–5.12)
SODIUM SERPL-SCNC: 140 MMOL/L (ref 136–145)
WBC # BLD AUTO: 6.82 THOUSAND/UL (ref 4.31–10.16)

## 2019-04-04 PROCEDURE — 36415 COLL VENOUS BLD VENIPUNCTURE: CPT

## 2019-04-04 PROCEDURE — 80053 COMPREHEN METABOLIC PANEL: CPT

## 2019-04-04 PROCEDURE — 86300 IMMUNOASSAY TUMOR CA 15-3: CPT

## 2019-04-04 PROCEDURE — 85025 COMPLETE CBC W/AUTO DIFF WBC: CPT

## 2019-04-05 RX ORDER — LAMOTRIGINE 25 MG/1
250 TABLET ORAL ONCE
Status: COMPLETED | OUTPATIENT
Start: 2019-04-10 | End: 2019-04-10

## 2019-04-10 ENCOUNTER — HOSPITAL ENCOUNTER (OUTPATIENT)
Dept: INFUSION CENTER | Facility: HOSPITAL | Age: 84
Discharge: HOME | End: 2019-04-10
Payer: MEDICARE

## 2019-04-10 VITALS
DIASTOLIC BLOOD PRESSURE: 74 MMHG | RESPIRATION RATE: 18 BRPM | TEMPERATURE: 98.6 F | SYSTOLIC BLOOD PRESSURE: 152 MMHG | HEART RATE: 83 BPM | OXYGEN SATURATION: 97 %

## 2019-04-10 PROCEDURE — 96402 CHEMO HORMON ANTINEOPL SQ/IM: CPT

## 2019-04-10 RX ADMIN — FULVESTRANT 250 MG: 50 INJECTION INTRAMUSCULAR at 11:09

## 2019-04-10 NOTE — PLAN OF CARE
Problem: Potential for Falls  Goal: Patient will remain free of falls  Description  INTERVENTIONS:  - Assess patient frequently for physical needs  -  Identify cognitive and physical deficits and behaviors that affect risk of falls  -  [place] fall precautions as indicated by assessment   - Educate patient/family on patient safety including physical limitations  - Instruct patient to call for assistance with activity based on assessment  - Modify environment to reduce risk of injury  - Consider OT/PT consult to assist with strengthening/mobility  Outcome: Progressing     Problem: Knowledge Deficit  Goal: Patient/family/caregiver demonstrates understanding of disease process, treatment plan, medications, and discharge instructions  Description  Complete learning assessment and assess knowledge base    Interventions:  - Provide teaching at level of understanding  - Provide teaching via preferred learning methods  Outcome: Progressing

## 2019-04-10 NOTE — PROGRESS NOTES
Pt arrived via w/c for Faslodex injections  Offers no c/o  Faslodex given IM both gluts  Pt abbie well  Dsd applied  Pt disch via w/c with daughter to home

## 2019-04-17 ENCOUNTER — TELEPHONE (OUTPATIENT)
Dept: FAMILY MEDICINE CLINIC | Facility: HOSPITAL | Age: 84
End: 2019-04-17

## 2019-04-17 DIAGNOSIS — F51.02 ADJUSTMENT INSOMNIA: Primary | ICD-10-CM

## 2019-04-17 RX ORDER — ZALEPLON 5 MG/1
5 CAPSULE ORAL
Qty: 30 CAPSULE | Refills: 0 | Status: SHIPPED | OUTPATIENT
Start: 2019-04-17

## 2019-04-18 ENCOUNTER — HOSPITAL ENCOUNTER (INPATIENT)
Facility: HOSPITAL | Age: 84
LOS: 3 days | Discharge: HOME HEALTH | DRG: 351 | End: 2019-04-21
Attending: EMERGENCY MEDICINE | Admitting: SURGERY
Payer: MEDICARE

## 2019-04-18 ENCOUNTER — APPOINTMENT (EMERGENCY)
Dept: CT IMAGING | Facility: HOSPITAL | Age: 84
DRG: 351 | End: 2019-04-18
Payer: MEDICARE

## 2019-04-18 DIAGNOSIS — Z98.890 S/P HERNIA REPAIR: ICD-10-CM

## 2019-04-18 DIAGNOSIS — Z87.19 S/P HERNIA REPAIR: ICD-10-CM

## 2019-04-18 DIAGNOSIS — K40.30 INCARCERATED RIGHT INGUINAL HERNIA: Primary | ICD-10-CM

## 2019-04-18 DIAGNOSIS — K56.609 SMALL BOWEL OBSTRUCTION (HCC): ICD-10-CM

## 2019-04-18 LAB
ALBUMIN SERPL BCP-MCNC: 3.4 G/DL (ref 3.5–5)
ALP SERPL-CCNC: 86 U/L (ref 46–116)
ALT SERPL W P-5'-P-CCNC: 44 U/L (ref 12–78)
ANION GAP SERPL CALCULATED.3IONS-SCNC: 4 MMOL/L (ref 4–13)
APTT PPP: 29 SECONDS (ref 26–38)
AST SERPL W P-5'-P-CCNC: 56 U/L (ref 5–45)
BASOPHILS # BLD AUTO: 0.04 THOUSANDS/ΜL (ref 0–0.1)
BASOPHILS NFR BLD AUTO: 0 % (ref 0–1)
BILIRUB DIRECT SERPL-MCNC: 0.18 MG/DL (ref 0–0.2)
BILIRUB SERPL-MCNC: 0.6 MG/DL (ref 0.2–1)
BUN SERPL-MCNC: 19 MG/DL (ref 5–25)
CALCIUM SERPL-MCNC: 9 MG/DL (ref 8.3–10.1)
CHLORIDE SERPL-SCNC: 101 MMOL/L (ref 100–108)
CO2 SERPL-SCNC: 33 MMOL/L (ref 21–32)
CREAT SERPL-MCNC: 0.86 MG/DL (ref 0.6–1.3)
EOSINOPHIL # BLD AUTO: 0.13 THOUSAND/ΜL (ref 0–0.61)
EOSINOPHIL NFR BLD AUTO: 1 % (ref 0–6)
ERYTHROCYTE [DISTWIDTH] IN BLOOD BY AUTOMATED COUNT: 14.5 % (ref 11.6–15.1)
GFR SERPL CREATININE-BSD FRML MDRD: 58 ML/MIN/1.73SQ M
GLUCOSE SERPL-MCNC: 124 MG/DL (ref 65–140)
HCT VFR BLD AUTO: 41.8 % (ref 34.8–46.1)
HGB BLD-MCNC: 13.6 G/DL (ref 11.5–15.4)
IMM GRANULOCYTES # BLD AUTO: 0.05 THOUSAND/UL (ref 0–0.2)
IMM GRANULOCYTES NFR BLD AUTO: 1 % (ref 0–2)
INR PPP: 1.12 (ref 0.86–1.17)
LIPASE SERPL-CCNC: 777 U/L (ref 73–393)
LYMPHOCYTES # BLD AUTO: 1.59 THOUSANDS/ΜL (ref 0.6–4.47)
LYMPHOCYTES NFR BLD AUTO: 17 % (ref 14–44)
MCH RBC QN AUTO: 30.3 PG (ref 26.8–34.3)
MCHC RBC AUTO-ENTMCNC: 32.5 G/DL (ref 31.4–37.4)
MCV RBC AUTO: 93 FL (ref 82–98)
MONOCYTES # BLD AUTO: 1.12 THOUSAND/ΜL (ref 0.17–1.22)
MONOCYTES NFR BLD AUTO: 12 % (ref 4–12)
NEUTROPHILS # BLD AUTO: 6.39 THOUSANDS/ΜL (ref 1.85–7.62)
NEUTS SEG NFR BLD AUTO: 69 % (ref 43–75)
NRBC BLD AUTO-RTO: 0 /100 WBCS
PLATELET # BLD AUTO: 161 THOUSANDS/UL (ref 149–390)
PMV BLD AUTO: 9.1 FL (ref 8.9–12.7)
POTASSIUM SERPL-SCNC: 4.4 MMOL/L (ref 3.5–5.3)
PROT SERPL-MCNC: 7.3 G/DL (ref 6.4–8.2)
PROTHROMBIN TIME: 13.8 SECONDS (ref 11.8–14.2)
RBC # BLD AUTO: 4.49 MILLION/UL (ref 3.81–5.12)
SODIUM SERPL-SCNC: 138 MMOL/L (ref 136–145)
WBC # BLD AUTO: 9.32 THOUSAND/UL (ref 4.31–10.16)

## 2019-04-18 PROCEDURE — 85025 COMPLETE CBC W/AUTO DIFF WBC: CPT | Performed by: EMERGENCY MEDICINE

## 2019-04-18 PROCEDURE — 1123F ACP DISCUSS/DSCN MKR DOCD: CPT | Performed by: EMERGENCY MEDICINE

## 2019-04-18 PROCEDURE — 36415 COLL VENOUS BLD VENIPUNCTURE: CPT | Performed by: EMERGENCY MEDICINE

## 2019-04-18 PROCEDURE — 96374 THER/PROPH/DIAG INJ IV PUSH: CPT

## 2019-04-18 PROCEDURE — 80076 HEPATIC FUNCTION PANEL: CPT | Performed by: EMERGENCY MEDICINE

## 2019-04-18 PROCEDURE — 74177 CT ABD & PELVIS W/CONTRAST: CPT

## 2019-04-18 PROCEDURE — 99285 EMERGENCY DEPT VISIT HI MDM: CPT | Performed by: EMERGENCY MEDICINE

## 2019-04-18 PROCEDURE — 93005 ELECTROCARDIOGRAM TRACING: CPT

## 2019-04-18 PROCEDURE — 85730 THROMBOPLASTIN TIME PARTIAL: CPT | Performed by: EMERGENCY MEDICINE

## 2019-04-18 PROCEDURE — 99285 EMERGENCY DEPT VISIT HI MDM: CPT

## 2019-04-18 PROCEDURE — 85610 PROTHROMBIN TIME: CPT | Performed by: EMERGENCY MEDICINE

## 2019-04-18 PROCEDURE — 83690 ASSAY OF LIPASE: CPT | Performed by: EMERGENCY MEDICINE

## 2019-04-18 PROCEDURE — 80048 BASIC METABOLIC PNL TOTAL CA: CPT | Performed by: EMERGENCY MEDICINE

## 2019-04-18 RX ORDER — ONDANSETRON 2 MG/ML
4 INJECTION INTRAMUSCULAR; INTRAVENOUS ONCE
Status: COMPLETED | OUTPATIENT
Start: 2019-04-18 | End: 2019-04-18

## 2019-04-18 RX ADMIN — ONDANSETRON 4 MG: 2 INJECTION INTRAMUSCULAR; INTRAVENOUS at 23:08

## 2019-04-18 RX ADMIN — IOHEXOL 100 ML: 350 INJECTION, SOLUTION INTRAVENOUS at 22:27

## 2019-04-18 RX ADMIN — SODIUM CHLORIDE 500 ML: 0.9 INJECTION, SOLUTION INTRAVENOUS at 21:48

## 2019-04-19 ENCOUNTER — ANESTHESIA EVENT (INPATIENT)
Dept: PERIOP | Facility: HOSPITAL | Age: 84
DRG: 351 | End: 2019-04-19
Payer: MEDICARE

## 2019-04-19 ENCOUNTER — ANESTHESIA (INPATIENT)
Dept: PERIOP | Facility: HOSPITAL | Age: 84
DRG: 351 | End: 2019-04-19
Payer: MEDICARE

## 2019-04-19 PROBLEM — R10.9 ABDOMINAL PAIN: Status: RESOLVED | Noted: 2019-04-19 | Resolved: 2019-04-19

## 2019-04-19 PROBLEM — K56.609 SMALL BOWEL OBSTRUCTION (HCC): Status: RESOLVED | Noted: 2019-04-18 | Resolved: 2019-04-19

## 2019-04-19 PROBLEM — K40.30 INCARCERATED RIGHT INGUINAL HERNIA: Status: RESOLVED | Noted: 2019-04-18 | Resolved: 2019-04-19

## 2019-04-19 PROBLEM — R10.9 ABDOMINAL PAIN: Status: ACTIVE | Noted: 2019-04-19

## 2019-04-19 LAB
ANION GAP SERPL CALCULATED.3IONS-SCNC: 10 MMOL/L (ref 4–13)
ATRIAL RATE: 80 BPM
BASOPHILS # BLD AUTO: 0.06 THOUSANDS/ΜL (ref 0–0.1)
BASOPHILS NFR BLD AUTO: 1 % (ref 0–1)
BUN SERPL-MCNC: 14 MG/DL (ref 5–25)
CALCIUM SERPL-MCNC: 8.5 MG/DL (ref 8.3–10.1)
CHLORIDE SERPL-SCNC: 102 MMOL/L (ref 100–108)
CO2 SERPL-SCNC: 24 MMOL/L (ref 21–32)
CREAT SERPL-MCNC: 0.64 MG/DL (ref 0.6–1.3)
EOSINOPHIL # BLD AUTO: 0.08 THOUSAND/ΜL (ref 0–0.61)
EOSINOPHIL NFR BLD AUTO: 1 % (ref 0–6)
ERYTHROCYTE [DISTWIDTH] IN BLOOD BY AUTOMATED COUNT: 14.5 % (ref 11.6–15.1)
GFR SERPL CREATININE-BSD FRML MDRD: 77 ML/MIN/1.73SQ M
GLUCOSE SERPL-MCNC: 106 MG/DL (ref 65–140)
HCT VFR BLD AUTO: 39.6 % (ref 34.8–46.1)
HGB BLD-MCNC: 12.7 G/DL (ref 11.5–15.4)
IMM GRANULOCYTES # BLD AUTO: 0.03 THOUSAND/UL (ref 0–0.2)
IMM GRANULOCYTES NFR BLD AUTO: 0 % (ref 0–2)
LYMPHOCYTES # BLD AUTO: 1.67 THOUSANDS/ΜL (ref 0.6–4.47)
LYMPHOCYTES NFR BLD AUTO: 17 % (ref 14–44)
MCH RBC QN AUTO: 30.6 PG (ref 26.8–34.3)
MCHC RBC AUTO-ENTMCNC: 32.1 G/DL (ref 31.4–37.4)
MCV RBC AUTO: 95 FL (ref 82–98)
MONOCYTES # BLD AUTO: 1.12 THOUSAND/ΜL (ref 0.17–1.22)
MONOCYTES NFR BLD AUTO: 11 % (ref 4–12)
NEUTROPHILS # BLD AUTO: 7.12 THOUSANDS/ΜL (ref 1.85–7.62)
NEUTS SEG NFR BLD AUTO: 70 % (ref 43–75)
NRBC BLD AUTO-RTO: 0 /100 WBCS
P AXIS: 58 DEGREES
PLATELET # BLD AUTO: 133 THOUSANDS/UL (ref 149–390)
PMV BLD AUTO: 9.2 FL (ref 8.9–12.7)
POTASSIUM SERPL-SCNC: 4.5 MMOL/L (ref 3.5–5.3)
PR INTERVAL: 194 MS
QRS AXIS: -84 DEGREES
QRSD INTERVAL: 162 MS
QT INTERVAL: 428 MS
QTC INTERVAL: 493 MS
RBC # BLD AUTO: 4.15 MILLION/UL (ref 3.81–5.12)
SODIUM SERPL-SCNC: 136 MMOL/L (ref 136–145)
T WAVE AXIS: 106 DEGREES
VENTRICULAR RATE: 80 BPM
WBC # BLD AUTO: 10.08 THOUSAND/UL (ref 4.31–10.16)

## 2019-04-19 PROCEDURE — 49553 RPR FEM HERNIA INIT BLOCKED: CPT | Performed by: PHYSICIAN ASSISTANT

## 2019-04-19 PROCEDURE — 49553 RPR FEM HERNIA INIT BLOCKED: CPT | Performed by: SURGERY

## 2019-04-19 PROCEDURE — 88342 IMHCHEM/IMCYTCHM 1ST ANTB: CPT | Performed by: PATHOLOGY

## 2019-04-19 PROCEDURE — 85025 COMPLETE CBC W/AUTO DIFF WBC: CPT | Performed by: SURGERY

## 2019-04-19 PROCEDURE — 88341 IMHCHEM/IMCYTCHM EA ADD ANTB: CPT | Performed by: PATHOLOGY

## 2019-04-19 PROCEDURE — 0YQ70ZZ REPAIR RIGHT FEMORAL REGION, OPEN APPROACH: ICD-10-PCS | Performed by: SURGERY

## 2019-04-19 PROCEDURE — 88302 TISSUE EXAM BY PATHOLOGIST: CPT | Performed by: PATHOLOGY

## 2019-04-19 PROCEDURE — NC001 PR NO CHARGE: Performed by: PHYSICIAN ASSISTANT

## 2019-04-19 PROCEDURE — 80048 BASIC METABOLIC PNL TOTAL CA: CPT | Performed by: SURGERY

## 2019-04-19 PROCEDURE — 99221 1ST HOSP IP/OBS SF/LOW 40: CPT | Performed by: SURGERY

## 2019-04-19 PROCEDURE — 93010 ELECTROCARDIOGRAM REPORT: CPT | Performed by: INTERNAL MEDICINE

## 2019-04-19 RX ORDER — ROCURONIUM BROMIDE 10 MG/ML
INJECTION, SOLUTION INTRAVENOUS AS NEEDED
Status: DISCONTINUED | OUTPATIENT
Start: 2019-04-19 | End: 2019-04-19 | Stop reason: SURG

## 2019-04-19 RX ORDER — AMOXICILLIN 250 MG
1 CAPSULE ORAL 2 TIMES DAILY
Status: DISCONTINUED | OUTPATIENT
Start: 2019-04-19 | End: 2019-04-21 | Stop reason: HOSPADM

## 2019-04-19 RX ORDER — ACETAMINOPHEN 325 MG/1
975 TABLET ORAL EVERY 8 HOURS SCHEDULED
Status: DISCONTINUED | OUTPATIENT
Start: 2019-04-19 | End: 2019-04-21 | Stop reason: HOSPADM

## 2019-04-19 RX ORDER — HYDROMORPHONE HCL/PF 1 MG/ML
0.2 SYRINGE (ML) INJECTION EVERY 4 HOURS PRN
Status: DISCONTINUED | OUTPATIENT
Start: 2019-04-19 | End: 2019-04-21 | Stop reason: HOSPADM

## 2019-04-19 RX ORDER — SODIUM CHLORIDE, SODIUM LACTATE, POTASSIUM CHLORIDE, CALCIUM CHLORIDE 600; 310; 30; 20 MG/100ML; MG/100ML; MG/100ML; MG/100ML
INJECTION, SOLUTION INTRAVENOUS CONTINUOUS PRN
Status: DISCONTINUED | OUTPATIENT
Start: 2019-04-19 | End: 2019-04-19 | Stop reason: SURG

## 2019-04-19 RX ORDER — METOPROLOL TARTRATE 50 MG/1
50 TABLET, FILM COATED ORAL 2 TIMES DAILY
Status: DISCONTINUED | OUTPATIENT
Start: 2019-04-19 | End: 2019-04-21 | Stop reason: HOSPADM

## 2019-04-19 RX ORDER — OXYCODONE HYDROCHLORIDE 5 MG/1
5 TABLET ORAL EVERY 4 HOURS PRN
Status: DISCONTINUED | OUTPATIENT
Start: 2019-04-19 | End: 2019-04-21 | Stop reason: HOSPADM

## 2019-04-19 RX ORDER — GLYCOPYRROLATE 0.2 MG/ML
INJECTION INTRAMUSCULAR; INTRAVENOUS AS NEEDED
Status: DISCONTINUED | OUTPATIENT
Start: 2019-04-19 | End: 2019-04-19 | Stop reason: SURG

## 2019-04-19 RX ORDER — BUPIVACAINE HYDROCHLORIDE AND EPINEPHRINE 2.5; 5 MG/ML; UG/ML
INJECTION, SOLUTION EPIDURAL; INFILTRATION; INTRACAUDAL; PERINEURAL AS NEEDED
Status: DISCONTINUED | OUTPATIENT
Start: 2019-04-19 | End: 2019-04-19 | Stop reason: HOSPADM

## 2019-04-19 RX ORDER — NEOSTIGMINE METHYLSULFATE 1 MG/ML
INJECTION INTRAVENOUS AS NEEDED
Status: DISCONTINUED | OUTPATIENT
Start: 2019-04-19 | End: 2019-04-19 | Stop reason: SURG

## 2019-04-19 RX ORDER — LABETALOL 20 MG/4 ML (5 MG/ML) INTRAVENOUS SYRINGE
5
Status: DISCONTINUED | OUTPATIENT
Start: 2019-04-19 | End: 2019-04-19 | Stop reason: HOSPADM

## 2019-04-19 RX ORDER — ONDANSETRON 2 MG/ML
4 INJECTION INTRAMUSCULAR; INTRAVENOUS EVERY 6 HOURS PRN
Status: DISCONTINUED | OUTPATIENT
Start: 2019-04-19 | End: 2019-04-21 | Stop reason: HOSPADM

## 2019-04-19 RX ORDER — OXYCODONE HYDROCHLORIDE 5 MG/1
2.5-5 TABLET ORAL EVERY 6 HOURS PRN
Qty: 12 TABLET | Refills: 0 | Status: SHIPPED | OUTPATIENT
Start: 2019-04-19 | End: 2019-04-29

## 2019-04-19 RX ORDER — FENTANYL CITRATE 50 UG/ML
INJECTION, SOLUTION INTRAMUSCULAR; INTRAVENOUS AS NEEDED
Status: DISCONTINUED | OUTPATIENT
Start: 2019-04-19 | End: 2019-04-19 | Stop reason: SURG

## 2019-04-19 RX ORDER — SODIUM CHLORIDE 9 MG/ML
75 INJECTION, SOLUTION INTRAVENOUS CONTINUOUS
Status: DISCONTINUED | OUTPATIENT
Start: 2019-04-19 | End: 2019-04-19

## 2019-04-19 RX ORDER — SUCCINYLCHOLINE/SOD CL,ISO/PF 100 MG/5ML
SYRINGE (ML) INTRAVENOUS AS NEEDED
Status: DISCONTINUED | OUTPATIENT
Start: 2019-04-19 | End: 2019-04-19 | Stop reason: SURG

## 2019-04-19 RX ORDER — PROPOFOL 10 MG/ML
INJECTION, EMULSION INTRAVENOUS AS NEEDED
Status: DISCONTINUED | OUTPATIENT
Start: 2019-04-19 | End: 2019-04-19 | Stop reason: SURG

## 2019-04-19 RX ORDER — FENTANYL CITRATE/PF 50 MCG/ML
25 SYRINGE (ML) INJECTION
Status: DISCONTINUED | OUTPATIENT
Start: 2019-04-19 | End: 2019-04-19 | Stop reason: HOSPADM

## 2019-04-19 RX ORDER — CEFAZOLIN SODIUM 1 G/50ML
1000 SOLUTION INTRAVENOUS ONCE
Status: COMPLETED | OUTPATIENT
Start: 2019-04-19 | End: 2019-04-19

## 2019-04-19 RX ORDER — DOCUSATE SODIUM 100 MG/1
100 CAPSULE, LIQUID FILLED ORAL 2 TIMES DAILY
Status: DISCONTINUED | OUTPATIENT
Start: 2019-04-19 | End: 2019-04-21 | Stop reason: HOSPADM

## 2019-04-19 RX ORDER — PANTOPRAZOLE SODIUM 40 MG/1
40 TABLET, DELAYED RELEASE ORAL
Status: DISCONTINUED | OUTPATIENT
Start: 2019-04-19 | End: 2019-04-21 | Stop reason: HOSPADM

## 2019-04-19 RX ORDER — HEPARIN SODIUM 5000 [USP'U]/ML
5000 INJECTION, SOLUTION INTRAVENOUS; SUBCUTANEOUS EVERY 8 HOURS SCHEDULED
Status: DISCONTINUED | OUTPATIENT
Start: 2019-04-19 | End: 2019-04-21 | Stop reason: HOSPADM

## 2019-04-19 RX ORDER — OXYCODONE HYDROCHLORIDE 5 MG/1
2.5 TABLET ORAL EVERY 4 HOURS PRN
Status: DISCONTINUED | OUTPATIENT
Start: 2019-04-19 | End: 2019-04-21 | Stop reason: HOSPADM

## 2019-04-19 RX ORDER — SODIUM CHLORIDE 9 MG/ML
50 INJECTION, SOLUTION INTRAVENOUS CONTINUOUS
Status: DISCONTINUED | OUTPATIENT
Start: 2019-04-19 | End: 2019-04-21 | Stop reason: HOSPADM

## 2019-04-19 RX ADMIN — SODIUM CHLORIDE, SODIUM LACTATE, POTASSIUM CHLORIDE, AND CALCIUM CHLORIDE: .6; .31; .03; .02 INJECTION, SOLUTION INTRAVENOUS at 01:23

## 2019-04-19 RX ADMIN — ROCURONIUM BROMIDE 8 MG: 10 INJECTION, SOLUTION INTRAVENOUS at 01:52

## 2019-04-19 RX ADMIN — PHENYLEPHRINE HYDROCHLORIDE 100 MCG: 10 INJECTION INTRAVENOUS at 01:54

## 2019-04-19 RX ADMIN — HEPARIN SODIUM 5000 UNITS: 5000 INJECTION, SOLUTION INTRAVENOUS; SUBCUTANEOUS at 13:51

## 2019-04-19 RX ADMIN — SODIUM CHLORIDE 75 ML/HR: 0.9 INJECTION, SOLUTION INTRAVENOUS at 04:13

## 2019-04-19 RX ADMIN — DOCUSATE SODIUM 100 MG: 100 CAPSULE, LIQUID FILLED ORAL at 21:25

## 2019-04-19 RX ADMIN — HEPARIN SODIUM 5000 UNITS: 5000 INJECTION, SOLUTION INTRAVENOUS; SUBCUTANEOUS at 21:25

## 2019-04-19 RX ADMIN — Medication 60 MG: at 01:39

## 2019-04-19 RX ADMIN — ACETAMINOPHEN 975 MG: 325 TABLET, FILM COATED ORAL at 05:56

## 2019-04-19 RX ADMIN — HEPARIN SODIUM 5000 UNITS: 5000 INJECTION, SOLUTION INTRAVENOUS; SUBCUTANEOUS at 05:57

## 2019-04-19 RX ADMIN — GLYCOPYRROLATE 0.2 MG: 0.2 INJECTION, SOLUTION INTRAMUSCULAR; INTRAVENOUS at 02:25

## 2019-04-19 RX ADMIN — PHENYLEPHRINE HYDROCHLORIDE 100 MCG: 10 INJECTION INTRAVENOUS at 01:43

## 2019-04-19 RX ADMIN — CEFAZOLIN SODIUM 1000 MG: 1 SOLUTION INTRAVENOUS at 01:42

## 2019-04-19 RX ADMIN — ACETAMINOPHEN 975 MG: 325 TABLET, FILM COATED ORAL at 21:25

## 2019-04-19 RX ADMIN — ROCURONIUM BROMIDE 5 MG: 10 INJECTION, SOLUTION INTRAVENOUS at 01:37

## 2019-04-19 RX ADMIN — PANTOPRAZOLE SODIUM 40 MG: 40 TABLET, DELAYED RELEASE ORAL at 17:44

## 2019-04-19 RX ADMIN — PHENYLEPHRINE HYDROCHLORIDE 100 MCG: 10 INJECTION INTRAVENOUS at 02:14

## 2019-04-19 RX ADMIN — PROPOFOL 100 MG: 10 INJECTION, EMULSION INTRAVENOUS at 01:39

## 2019-04-19 RX ADMIN — DOCUSATE SODIUM 100 MG: 100 CAPSULE, LIQUID FILLED ORAL at 11:39

## 2019-04-19 RX ADMIN — PHENYLEPHRINE HYDROCHLORIDE 100 MCG: 10 INJECTION INTRAVENOUS at 02:18

## 2019-04-19 RX ADMIN — FENTANYL CITRATE 25 MCG: 50 INJECTION, SOLUTION INTRAMUSCULAR; INTRAVENOUS at 01:37

## 2019-04-19 RX ADMIN — ACETAMINOPHEN 975 MG: 325 TABLET, FILM COATED ORAL at 13:51

## 2019-04-19 RX ADMIN — FENTANYL CITRATE 25 MCG: 50 INJECTION, SOLUTION INTRAMUSCULAR; INTRAVENOUS at 01:50

## 2019-04-19 RX ADMIN — SODIUM CHLORIDE 50 ML/HR: 0.9 INJECTION, SOLUTION INTRAVENOUS at 20:03

## 2019-04-19 RX ADMIN — METOPROLOL TARTRATE 50 MG: 50 TABLET, FILM COATED ORAL at 17:44

## 2019-04-19 RX ADMIN — SENNOSIDES AND DOCUSATE SODIUM 1 TABLET: 8.6; 5 TABLET ORAL at 11:40

## 2019-04-19 RX ADMIN — SENNOSIDES AND DOCUSATE SODIUM 1 TABLET: 8.6; 5 TABLET ORAL at 21:25

## 2019-04-19 RX ADMIN — NEOSTIGMINE METHYLSULFATE 2 MG: 1 INJECTION INTRAVENOUS at 02:25

## 2019-04-20 LAB
ANION GAP SERPL CALCULATED.3IONS-SCNC: 8 MMOL/L (ref 4–13)
BASOPHILS # BLD AUTO: 0.05 THOUSANDS/ΜL (ref 0–0.1)
BASOPHILS NFR BLD AUTO: 1 % (ref 0–1)
BUN SERPL-MCNC: 14 MG/DL (ref 5–25)
CALCIUM SERPL-MCNC: 8.3 MG/DL (ref 8.3–10.1)
CHLORIDE SERPL-SCNC: 105 MMOL/L (ref 100–108)
CO2 SERPL-SCNC: 26 MMOL/L (ref 21–32)
CREAT SERPL-MCNC: 0.84 MG/DL (ref 0.6–1.3)
EOSINOPHIL # BLD AUTO: 0.35 THOUSAND/ΜL (ref 0–0.61)
EOSINOPHIL NFR BLD AUTO: 5 % (ref 0–6)
ERYTHROCYTE [DISTWIDTH] IN BLOOD BY AUTOMATED COUNT: 14.8 % (ref 11.6–15.1)
GFR SERPL CREATININE-BSD FRML MDRD: 60 ML/MIN/1.73SQ M
GLUCOSE SERPL-MCNC: 88 MG/DL (ref 65–140)
HCT VFR BLD AUTO: 35.4 % (ref 34.8–46.1)
HGB BLD-MCNC: 11.4 G/DL (ref 11.5–15.4)
IMM GRANULOCYTES # BLD AUTO: 0.04 THOUSAND/UL (ref 0–0.2)
IMM GRANULOCYTES NFR BLD AUTO: 1 % (ref 0–2)
LYMPHOCYTES # BLD AUTO: 1.55 THOUSANDS/ΜL (ref 0.6–4.47)
LYMPHOCYTES NFR BLD AUTO: 21 % (ref 14–44)
MCH RBC QN AUTO: 30.8 PG (ref 26.8–34.3)
MCHC RBC AUTO-ENTMCNC: 32.2 G/DL (ref 31.4–37.4)
MCV RBC AUTO: 96 FL (ref 82–98)
MONOCYTES # BLD AUTO: 1.13 THOUSAND/ΜL (ref 0.17–1.22)
MONOCYTES NFR BLD AUTO: 16 % (ref 4–12)
NEUTROPHILS # BLD AUTO: 4.19 THOUSANDS/ΜL (ref 1.85–7.62)
NEUTS SEG NFR BLD AUTO: 56 % (ref 43–75)
NRBC BLD AUTO-RTO: 0 /100 WBCS
PLATELET # BLD AUTO: 127 THOUSANDS/UL (ref 149–390)
PMV BLD AUTO: 9.5 FL (ref 8.9–12.7)
POTASSIUM SERPL-SCNC: 4.1 MMOL/L (ref 3.5–5.3)
RBC # BLD AUTO: 3.7 MILLION/UL (ref 3.81–5.12)
SODIUM SERPL-SCNC: 139 MMOL/L (ref 136–145)
WBC # BLD AUTO: 7.31 THOUSAND/UL (ref 4.31–10.16)

## 2019-04-20 PROCEDURE — 99024 POSTOP FOLLOW-UP VISIT: CPT | Performed by: SURGERY

## 2019-04-20 PROCEDURE — 85025 COMPLETE CBC W/AUTO DIFF WBC: CPT | Performed by: PHYSICIAN ASSISTANT

## 2019-04-20 PROCEDURE — 80048 BASIC METABOLIC PNL TOTAL CA: CPT | Performed by: PHYSICIAN ASSISTANT

## 2019-04-20 RX ADMIN — HEPARIN SODIUM 5000 UNITS: 5000 INJECTION, SOLUTION INTRAVENOUS; SUBCUTANEOUS at 05:30

## 2019-04-20 RX ADMIN — SODIUM CHLORIDE 50 ML/HR: 0.9 INJECTION, SOLUTION INTRAVENOUS at 14:18

## 2019-04-20 RX ADMIN — HEPARIN SODIUM 5000 UNITS: 5000 INJECTION, SOLUTION INTRAVENOUS; SUBCUTANEOUS at 21:53

## 2019-04-20 RX ADMIN — METOPROLOL TARTRATE 50 MG: 50 TABLET, FILM COATED ORAL at 08:57

## 2019-04-20 RX ADMIN — ACETAMINOPHEN 975 MG: 325 TABLET, FILM COATED ORAL at 05:30

## 2019-04-20 RX ADMIN — ACETAMINOPHEN 975 MG: 325 TABLET, FILM COATED ORAL at 21:53

## 2019-04-20 RX ADMIN — SENNOSIDES AND DOCUSATE SODIUM 1 TABLET: 8.6; 5 TABLET ORAL at 17:06

## 2019-04-20 RX ADMIN — DOCUSATE SODIUM 100 MG: 100 CAPSULE, LIQUID FILLED ORAL at 17:07

## 2019-04-20 RX ADMIN — DOCUSATE SODIUM 100 MG: 100 CAPSULE, LIQUID FILLED ORAL at 08:58

## 2019-04-20 RX ADMIN — ACETAMINOPHEN 975 MG: 325 TABLET, FILM COATED ORAL at 14:11

## 2019-04-20 RX ADMIN — PANTOPRAZOLE SODIUM 40 MG: 40 TABLET, DELAYED RELEASE ORAL at 17:07

## 2019-04-20 RX ADMIN — SENNOSIDES AND DOCUSATE SODIUM 1 TABLET: 8.6; 5 TABLET ORAL at 08:58

## 2019-04-20 RX ADMIN — METOPROLOL TARTRATE 50 MG: 50 TABLET, FILM COATED ORAL at 17:06

## 2019-04-20 RX ADMIN — HEPARIN SODIUM 5000 UNITS: 5000 INJECTION, SOLUTION INTRAVENOUS; SUBCUTANEOUS at 14:11

## 2019-04-20 RX ADMIN — PANTOPRAZOLE SODIUM 40 MG: 40 TABLET, DELAYED RELEASE ORAL at 05:31

## 2019-04-21 VITALS
WEIGHT: 106.26 LBS | BODY MASS INDEX: 24.59 KG/M2 | SYSTOLIC BLOOD PRESSURE: 158 MMHG | OXYGEN SATURATION: 91 % | HEIGHT: 55 IN | DIASTOLIC BLOOD PRESSURE: 67 MMHG | RESPIRATION RATE: 17 BRPM | TEMPERATURE: 98.2 F | HEART RATE: 68 BPM

## 2019-04-21 PROCEDURE — 99024 POSTOP FOLLOW-UP VISIT: CPT | Performed by: SURGERY

## 2019-04-21 PROCEDURE — NC001 PR NO CHARGE: Performed by: SURGERY

## 2019-04-21 RX ADMIN — DOCUSATE SODIUM 100 MG: 100 CAPSULE, LIQUID FILLED ORAL at 09:00

## 2019-04-21 RX ADMIN — PANTOPRAZOLE SODIUM 40 MG: 40 TABLET, DELAYED RELEASE ORAL at 05:15

## 2019-04-21 RX ADMIN — METOPROLOL TARTRATE 50 MG: 50 TABLET, FILM COATED ORAL at 09:00

## 2019-04-21 RX ADMIN — ACETAMINOPHEN 975 MG: 325 TABLET, FILM COATED ORAL at 05:15

## 2019-04-21 RX ADMIN — SENNOSIDES AND DOCUSATE SODIUM 1 TABLET: 8.6; 5 TABLET ORAL at 09:00

## 2019-04-21 RX ADMIN — HEPARIN SODIUM 5000 UNITS: 5000 INJECTION, SOLUTION INTRAVENOUS; SUBCUTANEOUS at 05:15

## 2019-04-22 ENCOUNTER — TRANSITIONAL CARE MANAGEMENT (OUTPATIENT)
Dept: FAMILY MEDICINE CLINIC | Facility: HOSPITAL | Age: 84
End: 2019-04-22

## 2019-04-22 ENCOUNTER — TELEPHONE (OUTPATIENT)
Dept: SURGERY | Facility: HOSPITAL | Age: 84
End: 2019-04-22

## 2019-05-03 ENCOUNTER — OFFICE VISIT (OUTPATIENT)
Dept: FAMILY MEDICINE CLINIC | Facility: HOSPITAL | Age: 84
End: 2019-05-03
Payer: MEDICARE

## 2019-05-03 ENCOUNTER — APPOINTMENT (OUTPATIENT)
Dept: LAB | Facility: HOSPITAL | Age: 84
End: 2019-05-03
Attending: INTERNAL MEDICINE
Payer: MEDICARE

## 2019-05-03 VITALS
HEART RATE: 88 BPM | TEMPERATURE: 96.1 F | WEIGHT: 97.2 LBS | BODY MASS INDEX: 22.49 KG/M2 | SYSTOLIC BLOOD PRESSURE: 136 MMHG | HEIGHT: 55 IN | DIASTOLIC BLOOD PRESSURE: 74 MMHG

## 2019-05-03 DIAGNOSIS — C50.411 MALIGNANT NEOPLASM OF UPPER-OUTER QUADRANT OF RIGHT BREAST IN FEMALE, ESTROGEN RECEPTOR POSITIVE (HCC): ICD-10-CM

## 2019-05-03 DIAGNOSIS — R63.4 WEIGHT LOSS, UNINTENTIONAL: ICD-10-CM

## 2019-05-03 DIAGNOSIS — I44.2 AV BLOCK, COMPLETE (HCC): ICD-10-CM

## 2019-05-03 DIAGNOSIS — Z17.0 MALIGNANT NEOPLASM OF UPPER-OUTER QUADRANT OF RIGHT BREAST IN FEMALE, ESTROGEN RECEPTOR POSITIVE (HCC): ICD-10-CM

## 2019-05-03 DIAGNOSIS — K56.609 SMALL BOWEL OBSTRUCTION (HCC): ICD-10-CM

## 2019-05-03 DIAGNOSIS — I10 ESSENTIAL HYPERTENSION: Primary | ICD-10-CM

## 2019-05-03 PROCEDURE — 99495 TRANSJ CARE MGMT MOD F2F 14D: CPT | Performed by: FAMILY MEDICINE

## 2019-05-03 RX ORDER — LAMOTRIGINE 25 MG/1
500 TABLET ORAL ONCE
Status: CANCELLED | OUTPATIENT
Start: 2019-06-05

## 2019-05-06 ENCOUNTER — TELEPHONE (OUTPATIENT)
Dept: FAMILY MEDICINE CLINIC | Facility: HOSPITAL | Age: 84
End: 2019-05-06

## 2019-05-07 RX ORDER — LAMOTRIGINE 25 MG/1
500 TABLET ORAL ONCE
Status: COMPLETED | OUTPATIENT
Start: 2019-05-08 | End: 2019-05-08

## 2019-05-08 ENCOUNTER — HOSPITAL ENCOUNTER (OUTPATIENT)
Dept: INFUSION CENTER | Facility: HOSPITAL | Age: 84
Discharge: HOME | End: 2019-05-08
Payer: MEDICARE

## 2019-05-08 VITALS
TEMPERATURE: 98 F | OXYGEN SATURATION: 97 % | HEART RATE: 86 BPM | DIASTOLIC BLOOD PRESSURE: 78 MMHG | SYSTOLIC BLOOD PRESSURE: 146 MMHG

## 2019-05-08 PROCEDURE — 96402 CHEMO HORMON ANTINEOPL SQ/IM: CPT

## 2019-05-08 RX ADMIN — FULVESTRANT 500 MG: 50 INJECTION INTRAMUSCULAR at 11:25

## 2019-05-08 NOTE — PLAN OF CARE
Problem: Potential for Falls  Goal: Patient will remain free of falls  Description  INTERVENTIONS:  - Assess patient frequently for physical needs  -  Identify cognitive and physical deficits and behaviors that affect risk of falls  -  [place] fall precautions as indicated by assessment   - Educate patient/family on patient safety including physical limitations  - Instruct patient to call for assistance with activity based on assessment  - Modify environment to reduce risk of injury  - Consider OT/PT consult to assist with strengthening/mobility  Outcome: Progressing     Problem: Knowledge Deficit  Goal: Patient/family/caregiver demonstrates understanding of disease process, treatment plan, medications, and discharge instructions  Description  Complete learning assessment and assess knowledge base    Interventions:  - Provide teaching at level of understanding  - Provide teaching via preferred learning methods  Outcome: Progressing

## 2019-05-13 ENCOUNTER — OFFICE VISIT (OUTPATIENT)
Dept: SURGERY | Facility: HOSPITAL | Age: 84
End: 2019-05-13

## 2019-05-13 VITALS
HEIGHT: 55 IN | BODY MASS INDEX: 22.4 KG/M2 | SYSTOLIC BLOOD PRESSURE: 155 MMHG | TEMPERATURE: 97.4 F | DIASTOLIC BLOOD PRESSURE: 85 MMHG | HEART RATE: 88 BPM | WEIGHT: 96.8 LBS

## 2019-05-13 DIAGNOSIS — Z09 POSTOP CHECK: Primary | ICD-10-CM

## 2019-05-13 PROCEDURE — 99024 POSTOP FOLLOW-UP VISIT: CPT | Performed by: SURGERY

## 2019-05-22 ENCOUNTER — OFFICE VISIT (OUTPATIENT)
Dept: HEMATOLOGY ONCOLOGY | Facility: HOSPITAL | Age: 84
End: 2019-05-22
Payer: MEDICARE

## 2019-05-22 VITALS
SYSTOLIC BLOOD PRESSURE: 150 MMHG | WEIGHT: 93 LBS | HEART RATE: 91 BPM | HEIGHT: 55 IN | OXYGEN SATURATION: 98 % | DIASTOLIC BLOOD PRESSURE: 82 MMHG | RESPIRATION RATE: 16 BRPM | TEMPERATURE: 95.5 F | BODY MASS INDEX: 21.52 KG/M2

## 2019-05-22 DIAGNOSIS — Z17.0 MALIGNANT NEOPLASM OF UPPER-OUTER QUADRANT OF RIGHT BREAST IN FEMALE, ESTROGEN RECEPTOR POSITIVE (HCC): Primary | ICD-10-CM

## 2019-05-22 DIAGNOSIS — C50.411 MALIGNANT NEOPLASM OF UPPER-OUTER QUADRANT OF RIGHT BREAST IN FEMALE, ESTROGEN RECEPTOR POSITIVE (HCC): Primary | ICD-10-CM

## 2019-05-22 PROCEDURE — 99214 OFFICE O/P EST MOD 30 MIN: CPT | Performed by: INTERNAL MEDICINE

## 2019-05-23 DIAGNOSIS — C50.411 MALIGNANT NEOPLASM OF UPPER-OUTER QUADRANT OF RIGHT BREAST IN FEMALE, ESTROGEN RECEPTOR POSITIVE (HCC): ICD-10-CM

## 2019-05-23 DIAGNOSIS — Z17.0 MALIGNANT NEOPLASM OF UPPER-OUTER QUADRANT OF RIGHT BREAST IN FEMALE, ESTROGEN RECEPTOR POSITIVE (HCC): ICD-10-CM

## 2019-05-23 RX ORDER — LAMOTRIGINE 25 MG/1
500 TABLET ORAL ONCE
Status: CANCELLED | OUTPATIENT
Start: 2019-06-05

## 2019-05-31 ENCOUNTER — OFFICE VISIT (OUTPATIENT)
Dept: SURGERY | Facility: HOSPITAL | Age: 84
End: 2019-05-31

## 2019-05-31 ENCOUNTER — APPOINTMENT (OUTPATIENT)
Dept: LAB | Facility: HOSPITAL | Age: 84
End: 2019-05-31
Attending: INTERNAL MEDICINE
Payer: MEDICARE

## 2019-05-31 VITALS — BODY MASS INDEX: 21.87 KG/M2 | WEIGHT: 94.5 LBS | TEMPERATURE: 97.4 F | HEIGHT: 55 IN

## 2019-05-31 DIAGNOSIS — Z17.0 MALIGNANT NEOPLASM OF UPPER-OUTER QUADRANT OF RIGHT BREAST IN FEMALE, ESTROGEN RECEPTOR POSITIVE (HCC): ICD-10-CM

## 2019-05-31 DIAGNOSIS — Z98.890 POST-OPERATIVE STATE: Primary | ICD-10-CM

## 2019-05-31 DIAGNOSIS — C50.411 MALIGNANT NEOPLASM OF UPPER-OUTER QUADRANT OF RIGHT BREAST IN FEMALE, ESTROGEN RECEPTOR POSITIVE (HCC): ICD-10-CM

## 2019-05-31 LAB
ALBUMIN SERPL BCP-MCNC: 3.3 G/DL (ref 3.5–5)
ALP SERPL-CCNC: 59 U/L (ref 46–116)
ALT SERPL W P-5'-P-CCNC: 34 U/L (ref 12–78)
ANION GAP SERPL CALCULATED.3IONS-SCNC: 6 MMOL/L (ref 4–13)
AST SERPL W P-5'-P-CCNC: 45 U/L (ref 5–45)
BASOPHILS # BLD AUTO: 0.05 THOUSANDS/ΜL (ref 0–0.1)
BASOPHILS NFR BLD AUTO: 1 % (ref 0–1)
BILIRUB SERPL-MCNC: 0.6 MG/DL (ref 0.2–1)
BUN SERPL-MCNC: 18 MG/DL (ref 5–25)
CALCIUM SERPL-MCNC: 8.9 MG/DL (ref 8.3–10.1)
CANCER AG27-29 SERPL-ACNC: 34.8 U/ML (ref 0–42.3)
CHLORIDE SERPL-SCNC: 104 MMOL/L (ref 100–108)
CO2 SERPL-SCNC: 32 MMOL/L (ref 21–32)
CREAT SERPL-MCNC: 0.9 MG/DL (ref 0.6–1.3)
EOSINOPHIL # BLD AUTO: 0.31 THOUSAND/ΜL (ref 0–0.61)
EOSINOPHIL NFR BLD AUTO: 5 % (ref 0–6)
ERYTHROCYTE [DISTWIDTH] IN BLOOD BY AUTOMATED COUNT: 14.3 % (ref 11.6–15.1)
GFR SERPL CREATININE-BSD FRML MDRD: 55 ML/MIN/1.73SQ M
GLUCOSE P FAST SERPL-MCNC: 68 MG/DL (ref 65–99)
HCT VFR BLD AUTO: 39.4 % (ref 34.8–46.1)
HGB BLD-MCNC: 12.7 G/DL (ref 11.5–15.4)
IMM GRANULOCYTES # BLD AUTO: 0.04 THOUSAND/UL (ref 0–0.2)
IMM GRANULOCYTES NFR BLD AUTO: 1 % (ref 0–2)
LYMPHOCYTES # BLD AUTO: 1.38 THOUSANDS/ΜL (ref 0.6–4.47)
LYMPHOCYTES NFR BLD AUTO: 22 % (ref 14–44)
MCH RBC QN AUTO: 31.3 PG (ref 26.8–34.3)
MCHC RBC AUTO-ENTMCNC: 32.2 G/DL (ref 31.4–37.4)
MCV RBC AUTO: 97 FL (ref 82–98)
MONOCYTES # BLD AUTO: 0.93 THOUSAND/ΜL (ref 0.17–1.22)
MONOCYTES NFR BLD AUTO: 15 % (ref 4–12)
NEUTROPHILS # BLD AUTO: 3.6 THOUSANDS/ΜL (ref 1.85–7.62)
NEUTS SEG NFR BLD AUTO: 56 % (ref 43–75)
NRBC BLD AUTO-RTO: 0 /100 WBCS
PLATELET # BLD AUTO: 148 THOUSANDS/UL (ref 149–390)
PMV BLD AUTO: 9.2 FL (ref 8.9–12.7)
POTASSIUM SERPL-SCNC: 4.3 MMOL/L (ref 3.5–5.3)
PROT SERPL-MCNC: 7.1 G/DL (ref 6.4–8.2)
RBC # BLD AUTO: 4.06 MILLION/UL (ref 3.81–5.12)
SODIUM SERPL-SCNC: 142 MMOL/L (ref 136–145)
WBC # BLD AUTO: 6.31 THOUSAND/UL (ref 4.31–10.16)

## 2019-05-31 PROCEDURE — 36415 COLL VENOUS BLD VENIPUNCTURE: CPT

## 2019-05-31 PROCEDURE — 85025 COMPLETE CBC W/AUTO DIFF WBC: CPT

## 2019-05-31 PROCEDURE — 86300 IMMUNOASSAY TUMOR CA 15-3: CPT

## 2019-05-31 PROCEDURE — 99024 POSTOP FOLLOW-UP VISIT: CPT | Performed by: PHYSICIAN ASSISTANT

## 2019-05-31 PROCEDURE — 80053 COMPREHEN METABOLIC PANEL: CPT

## 2019-06-05 ENCOUNTER — HOSPITAL ENCOUNTER (OUTPATIENT)
Dept: INFUSION CENTER | Facility: HOSPITAL | Age: 84
Discharge: HOME | End: 2019-06-05
Payer: MEDICARE

## 2019-06-05 ENCOUNTER — REMOTE DEVICE CLINIC VISIT (OUTPATIENT)
Dept: CARDIOLOGY CLINIC | Facility: CLINIC | Age: 84
End: 2019-06-05
Payer: MEDICARE

## 2019-06-05 VITALS
HEART RATE: 83 BPM | OXYGEN SATURATION: 95 % | DIASTOLIC BLOOD PRESSURE: 83 MMHG | TEMPERATURE: 97.4 F | SYSTOLIC BLOOD PRESSURE: 151 MMHG | RESPIRATION RATE: 16 BRPM

## 2019-06-05 DIAGNOSIS — C50.411 MALIGNANT NEOPLASM OF UPPER-OUTER QUADRANT OF RIGHT BREAST IN FEMALE, ESTROGEN RECEPTOR POSITIVE (HCC): Primary | ICD-10-CM

## 2019-06-05 DIAGNOSIS — I44.30 AV BLOCK: Primary | ICD-10-CM

## 2019-06-05 DIAGNOSIS — Z95.0 PACEMAKER: ICD-10-CM

## 2019-06-05 DIAGNOSIS — Z17.0 MALIGNANT NEOPLASM OF UPPER-OUTER QUADRANT OF RIGHT BREAST IN FEMALE, ESTROGEN RECEPTOR POSITIVE (HCC): Primary | ICD-10-CM

## 2019-06-05 PROCEDURE — 93280 PM DEVICE PROGR EVAL DUAL: CPT | Performed by: INTERNAL MEDICINE

## 2019-06-05 PROCEDURE — 96402 CHEMO HORMON ANTINEOPL SQ/IM: CPT

## 2019-06-05 RX ORDER — LAMOTRIGINE 25 MG/1
500 TABLET ORAL ONCE
Status: CANCELLED | OUTPATIENT
Start: 2019-07-03

## 2019-06-05 RX ORDER — LAMOTRIGINE 25 MG/1
500 TABLET ORAL ONCE
Status: COMPLETED | OUTPATIENT
Start: 2019-06-05 | End: 2019-06-05

## 2019-06-05 RX ADMIN — FULVESTRANT 500 MG: 50 INJECTION INTRAMUSCULAR at 10:51

## 2019-06-05 NOTE — PLAN OF CARE
Problem: Potential for Falls  Goal: Patient will remain free of falls  Description  INTERVENTIONS:  - Assess patient frequently for physical needs  -  Identify cognitive and physical deficits and behaviors that affect risk of falls    -  [place] fall precautions as indicated by assessment   - Educate patient/family on patient safety including physical limitations  - Instruct patient to call for assistance with activity based on assessment  - Modify environment to reduce risk of injury  - Consider OT/PT consult to assist with strengthening/mobility  Outcome: Progressing

## 2019-06-06 ENCOUNTER — OFFICE VISIT (OUTPATIENT)
Dept: SURGICAL ONCOLOGY | Facility: HOSPITAL | Age: 84
End: 2019-06-06
Payer: MEDICARE

## 2019-06-06 VITALS
DIASTOLIC BLOOD PRESSURE: 70 MMHG | TEMPERATURE: 98.2 F | RESPIRATION RATE: 14 BRPM | BODY MASS INDEX: 21.85 KG/M2 | SYSTOLIC BLOOD PRESSURE: 130 MMHG | HEIGHT: 55 IN | WEIGHT: 94.4 LBS | HEART RATE: 86 BPM

## 2019-06-06 DIAGNOSIS — Z79.818 USE OF FULVESTRANT (FASLODEX): ICD-10-CM

## 2019-06-06 DIAGNOSIS — C79.2 CANCER, METASTATIC TO SKIN (HCC): ICD-10-CM

## 2019-06-06 DIAGNOSIS — C50.411 MALIGNANT NEOPLASM OF UPPER-OUTER QUADRANT OF RIGHT BREAST IN FEMALE, ESTROGEN RECEPTOR POSITIVE (HCC): Primary | ICD-10-CM

## 2019-06-06 DIAGNOSIS — Z17.0 MALIGNANT NEOPLASM OF UPPER-OUTER QUADRANT OF RIGHT BREAST IN FEMALE, ESTROGEN RECEPTOR POSITIVE (HCC): Primary | ICD-10-CM

## 2019-06-06 PROCEDURE — 99214 OFFICE O/P EST MOD 30 MIN: CPT | Performed by: SURGERY

## 2019-06-11 DIAGNOSIS — I10 ESSENTIAL HYPERTENSION: ICD-10-CM

## 2019-06-11 DIAGNOSIS — K21.00 GASTROESOPHAGEAL REFLUX DISEASE WITH ESOPHAGITIS: ICD-10-CM

## 2019-06-11 RX ORDER — METOPROLOL TARTRATE 50 MG/1
50 TABLET, FILM COATED ORAL 2 TIMES DAILY
Qty: 180 TABLET | Refills: 2 | Status: SHIPPED | OUTPATIENT
Start: 2019-06-11

## 2019-06-11 RX ORDER — PANTOPRAZOLE SODIUM 40 MG/1
40 TABLET, DELAYED RELEASE ORAL
Qty: 180 TABLET | Refills: 2 | Status: SHIPPED | OUTPATIENT
Start: 2019-06-11

## 2019-06-11 RX ORDER — LOSARTAN POTASSIUM 50 MG/1
50 TABLET ORAL DAILY
Qty: 90 TABLET | Refills: 2 | Status: SHIPPED | OUTPATIENT
Start: 2019-06-11

## 2019-06-28 ENCOUNTER — APPOINTMENT (OUTPATIENT)
Dept: LAB | Facility: HOSPITAL | Age: 84
End: 2019-06-28
Attending: INTERNAL MEDICINE
Payer: MEDICARE

## 2019-06-28 LAB
ALBUMIN SERPL BCP-MCNC: 3.2 G/DL (ref 3.5–5)
ALP SERPL-CCNC: 60 U/L (ref 46–116)
ALT SERPL W P-5'-P-CCNC: 41 U/L (ref 12–78)
ANION GAP SERPL CALCULATED.3IONS-SCNC: 6 MMOL/L (ref 4–13)
AST SERPL W P-5'-P-CCNC: 59 U/L (ref 5–45)
BASOPHILS # BLD AUTO: 0.04 THOUSANDS/ΜL (ref 0–0.1)
BASOPHILS NFR BLD AUTO: 1 % (ref 0–1)
BILIRUB SERPL-MCNC: 0.6 MG/DL (ref 0.2–1)
BUN SERPL-MCNC: 19 MG/DL (ref 5–25)
CALCIUM SERPL-MCNC: 8.6 MG/DL (ref 8.3–10.1)
CHLORIDE SERPL-SCNC: 104 MMOL/L (ref 100–108)
CO2 SERPL-SCNC: 30 MMOL/L (ref 21–32)
CREAT SERPL-MCNC: 1.13 MG/DL (ref 0.6–1.3)
EOSINOPHIL # BLD AUTO: 0.25 THOUSAND/ΜL (ref 0–0.61)
EOSINOPHIL NFR BLD AUTO: 4 % (ref 0–6)
ERYTHROCYTE [DISTWIDTH] IN BLOOD BY AUTOMATED COUNT: 14.3 % (ref 11.6–15.1)
GFR SERPL CREATININE-BSD FRML MDRD: 42 ML/MIN/1.73SQ M
GLUCOSE SERPL-MCNC: 81 MG/DL (ref 65–140)
HCT VFR BLD AUTO: 39.3 % (ref 34.8–46.1)
HGB BLD-MCNC: 12.9 G/DL (ref 11.5–15.4)
IMM GRANULOCYTES # BLD AUTO: 0.03 THOUSAND/UL (ref 0–0.2)
IMM GRANULOCYTES NFR BLD AUTO: 1 % (ref 0–2)
LYMPHOCYTES # BLD AUTO: 1.68 THOUSANDS/ΜL (ref 0.6–4.47)
LYMPHOCYTES NFR BLD AUTO: 28 % (ref 14–44)
MCH RBC QN AUTO: 31.5 PG (ref 26.8–34.3)
MCHC RBC AUTO-ENTMCNC: 32.8 G/DL (ref 31.4–37.4)
MCV RBC AUTO: 96 FL (ref 82–98)
MONOCYTES # BLD AUTO: 0.9 THOUSAND/ΜL (ref 0.17–1.22)
MONOCYTES NFR BLD AUTO: 15 % (ref 4–12)
NEUTROPHILS # BLD AUTO: 3.11 THOUSANDS/ΜL (ref 1.85–7.62)
NEUTS SEG NFR BLD AUTO: 51 % (ref 43–75)
NRBC BLD AUTO-RTO: 0 /100 WBCS
PLATELET # BLD AUTO: 149 THOUSANDS/UL (ref 149–390)
PMV BLD AUTO: 9.3 FL (ref 8.9–12.7)
POTASSIUM SERPL-SCNC: 4.3 MMOL/L (ref 3.5–5.3)
PROT SERPL-MCNC: 7 G/DL (ref 6.4–8.2)
RBC # BLD AUTO: 4.09 MILLION/UL (ref 3.81–5.12)
SODIUM SERPL-SCNC: 140 MMOL/L (ref 136–145)
WBC # BLD AUTO: 6.01 THOUSAND/UL (ref 4.31–10.16)

## 2019-06-28 PROCEDURE — 85025 COMPLETE CBC W/AUTO DIFF WBC: CPT | Performed by: INTERNAL MEDICINE

## 2019-06-28 PROCEDURE — 36415 COLL VENOUS BLD VENIPUNCTURE: CPT | Performed by: INTERNAL MEDICINE

## 2019-06-28 PROCEDURE — 86300 IMMUNOASSAY TUMOR CA 15-3: CPT | Performed by: INTERNAL MEDICINE

## 2019-06-28 PROCEDURE — 80053 COMPREHEN METABOLIC PANEL: CPT | Performed by: INTERNAL MEDICINE

## 2019-06-29 LAB — CANCER AG27-29 SERPL-ACNC: 33.1 U/ML (ref 0–42.3)

## 2019-07-03 ENCOUNTER — HOSPITAL ENCOUNTER (OUTPATIENT)
Dept: INFUSION CENTER | Facility: HOSPITAL | Age: 84
Discharge: HOME | End: 2019-07-03
Attending: INTERNAL MEDICINE

## 2019-07-09 RX ORDER — LAMOTRIGINE 25 MG/1
500 TABLET ORAL ONCE
Status: CANCELLED | OUTPATIENT
Start: 2019-07-10

## 2019-07-10 ENCOUNTER — HOSPITAL ENCOUNTER (OUTPATIENT)
Dept: INFUSION CENTER | Facility: HOSPITAL | Age: 84
Discharge: HOME | End: 2019-07-10
Attending: INTERNAL MEDICINE
Payer: MEDICARE

## 2019-07-10 VITALS
HEART RATE: 89 BPM | SYSTOLIC BLOOD PRESSURE: 150 MMHG | RESPIRATION RATE: 18 BRPM | OXYGEN SATURATION: 98 % | TEMPERATURE: 97.1 F | DIASTOLIC BLOOD PRESSURE: 84 MMHG

## 2019-07-10 DIAGNOSIS — C50.411 MALIGNANT NEOPLASM OF UPPER-OUTER QUADRANT OF RIGHT BREAST IN FEMALE, ESTROGEN RECEPTOR POSITIVE (HCC): Primary | ICD-10-CM

## 2019-07-10 DIAGNOSIS — Z17.0 MALIGNANT NEOPLASM OF UPPER-OUTER QUADRANT OF RIGHT BREAST IN FEMALE, ESTROGEN RECEPTOR POSITIVE (HCC): Primary | ICD-10-CM

## 2019-07-10 PROCEDURE — 96402 CHEMO HORMON ANTINEOPL SQ/IM: CPT

## 2019-07-10 RX ORDER — LAMOTRIGINE 25 MG/1
500 TABLET ORAL ONCE
Status: CANCELLED | OUTPATIENT
Start: 2019-08-07

## 2019-07-10 RX ORDER — LAMOTRIGINE 25 MG/1
500 TABLET ORAL ONCE
Status: COMPLETED | OUTPATIENT
Start: 2019-07-10 | End: 2019-07-10

## 2019-07-10 RX ADMIN — FULVESTRANT 500 MG: 50 INJECTION INTRAMUSCULAR at 11:47

## 2019-07-10 NOTE — PROGRESS NOTES
Patient tolerated faslodex inj in b/l glut today with no adverse reactions  Pt educated to notify MD with s/s of reactions including SOB, Rash, and hives Patient gave verbal understanding  Patient then d/cd home ambulatory with steady gait

## 2019-07-10 NOTE — PLAN OF CARE
Problem: Potential for Falls  Goal: Patient will remain free of falls  Description  INTERVENTIONS:  - Assess patient frequently for physical needs  -  Identify cognitive and physical deficits and behaviors that affect risk of falls    -  [place] fall precautions as indicated by assessment   - Educate patient/family on patient safety including physical limitations  - Instruct patient to call for assistance with activity based on assessment  - Modify environment to reduce risk of injury  - Consider OT/PT consult to assist with strengthening/mobility  Outcome: Progressing

## 2019-07-31 ENCOUNTER — HOSPITAL ENCOUNTER (OUTPATIENT)
Dept: INFUSION CENTER | Facility: HOSPITAL | Age: 84
End: 2019-07-31
Attending: INTERNAL MEDICINE

## 2019-08-05 ENCOUNTER — OFFICE VISIT (OUTPATIENT)
Dept: FAMILY MEDICINE CLINIC | Facility: HOSPITAL | Age: 84
End: 2019-08-05
Payer: MEDICARE

## 2019-08-05 ENCOUNTER — APPOINTMENT (OUTPATIENT)
Dept: LAB | Facility: HOSPITAL | Age: 84
End: 2019-08-05
Attending: INTERNAL MEDICINE
Payer: MEDICARE

## 2019-08-05 VITALS
SYSTOLIC BLOOD PRESSURE: 130 MMHG | HEART RATE: 90 BPM | BODY MASS INDEX: 20.92 KG/M2 | TEMPERATURE: 96 F | DIASTOLIC BLOOD PRESSURE: 76 MMHG | WEIGHT: 90.4 LBS | HEIGHT: 55 IN

## 2019-08-05 DIAGNOSIS — R73.9 HYPERGLYCEMIA: ICD-10-CM

## 2019-08-05 DIAGNOSIS — C50.411 MALIGNANT NEOPLASM OF UPPER-OUTER QUADRANT OF RIGHT BREAST IN FEMALE, ESTROGEN RECEPTOR POSITIVE (HCC): ICD-10-CM

## 2019-08-05 DIAGNOSIS — I10 ESSENTIAL HYPERTENSION: ICD-10-CM

## 2019-08-05 DIAGNOSIS — F43.22 ADJUSTMENT DISORDER WITH ANXIETY: ICD-10-CM

## 2019-08-05 DIAGNOSIS — Z17.0 MALIGNANT NEOPLASM OF UPPER-OUTER QUADRANT OF RIGHT BREAST IN FEMALE, ESTROGEN RECEPTOR POSITIVE (HCC): ICD-10-CM

## 2019-08-05 DIAGNOSIS — E78.2 MIXED HYPERLIPIDEMIA: ICD-10-CM

## 2019-08-05 DIAGNOSIS — R13.14 PHARYNGOESOPHAGEAL DYSPHAGIA: Primary | ICD-10-CM

## 2019-08-05 DIAGNOSIS — R63.4 WEIGHT LOSS: ICD-10-CM

## 2019-08-05 PROCEDURE — 99214 OFFICE O/P EST MOD 30 MIN: CPT | Performed by: FAMILY MEDICINE

## 2019-08-05 NOTE — PROGRESS NOTES
Assessment/Plan:         Diagnoses and all orders for this visit:    Pharyngoesophageal dysphagia  -     FL barium swallow video w speech; Future    Weight loss    Essential hypertension  Comments:  Very good BP control    Malignant neoplasm of upper-outer quadrant of right breast in female, estrogen receptor positive ([address] )  Comments:  FELICITY    Mixed hyperlipidemia    Hyperglycemia    Adjustment disorder with anxiety  Comments:  Continue PRN Lorazepam use  Subjective:      Patient ID: Rosa Bejarano is a 80 y o  female  3 month follow up  So tired all the time, despite sleeping well at night  Stomach blocks up and if she eats something it gets stuck in mid chest mostly liquids  She does then cough and choke if she drinks water  Feels good first thing I the morning then feels tired  The following portions of the patient's history were reviewed and updated as appropriate: allergies, current medications, past family history, past medical history, past social history, past surgical history and problem list     Review of Systems   Constitutional: Positive for appetite change, fatigue and unexpected weight change  HENT: Positive for trouble swallowing  Respiratory: Positive for choking  Cardiovascular: Negative  Genitourinary: Negative  Musculoskeletal: Positive for arthralgias  Neurological: Negative  Hematological: Negative  Psychiatric/Behavioral: The patient is nervous/anxious  All other systems reviewed and are negative  Objective:      /76   Pulse 90   Temp (!) 96 °F (35 6 °C)   Ht 4' 7" (1 397 m)   Wt 41 kg (90 lb 6 4 oz)   BMI 21 01 kg/m²          Physical Exam   Constitutional: She is oriented to person, place, and time  She appears well-developed and well-nourished  Neck: No thyromegaly present  Cardiovascular: Normal rate, regular rhythm, normal heart sounds and intact distal pulses     Pulmonary/Chest: Effort normal and breath sounds normal    Neurological: She is alert and oriented to person, place, and time  Psychiatric: She has a normal mood and affect  Her behavior is normal    Nursing note and vitals reviewed

## 2019-08-07 ENCOUNTER — HOSPITAL ENCOUNTER (OUTPATIENT)
Dept: INFUSION CENTER | Facility: HOSPITAL | Age: 84
Discharge: HOME | End: 2019-08-07
Attending: INTERNAL MEDICINE
Payer: MEDICARE

## 2019-08-07 VITALS
SYSTOLIC BLOOD PRESSURE: 128 MMHG | OXYGEN SATURATION: 95 % | HEART RATE: 81 BPM | RESPIRATION RATE: 18 BRPM | DIASTOLIC BLOOD PRESSURE: 70 MMHG | TEMPERATURE: 97.5 F

## 2019-08-07 DIAGNOSIS — C50.411 MALIGNANT NEOPLASM OF UPPER-OUTER QUADRANT OF RIGHT BREAST IN FEMALE, ESTROGEN RECEPTOR POSITIVE (HCC): Primary | ICD-10-CM

## 2019-08-07 DIAGNOSIS — Z17.0 MALIGNANT NEOPLASM OF UPPER-OUTER QUADRANT OF RIGHT BREAST IN FEMALE, ESTROGEN RECEPTOR POSITIVE (HCC): Primary | ICD-10-CM

## 2019-08-07 PROCEDURE — 96402 CHEMO HORMON ANTINEOPL SQ/IM: CPT

## 2019-08-07 RX ORDER — LAMOTRIGINE 25 MG/1
500 TABLET ORAL ONCE
Status: CANCELLED | OUTPATIENT
Start: 2019-09-04

## 2019-08-07 RX ORDER — LAMOTRIGINE 25 MG/1
500 TABLET ORAL ONCE
Status: COMPLETED | OUTPATIENT
Start: 2019-08-07 | End: 2019-08-07

## 2019-08-07 RX ADMIN — FULVESTRANT 500 MG: 50 INJECTION INTRAMUSCULAR at 11:55

## 2019-08-07 NOTE — PROGRESS NOTES
Pt arrived via w/c for Faslodex injections  Familiar with procedure  C/o fatigue all the time  States her PCP just told her to drink more Ensure, she is losing weight  Faslodex given IM both gluts, dsd applied  Pt abbie well  Disch via w/c to home with daughter

## 2019-08-07 NOTE — PLAN OF CARE
Problem: Potential for Falls  Goal: Patient will remain free of falls  Description  INTERVENTIONS:  - Assess patient frequently for physical needs  -  Identify cognitive and physical deficits and behaviors that affect risk of falls  -  [place] fall precautions as indicated by assessment   - Educate patient/family on patient safety including physical limitations  - Instruct patient to call for assistance with activity based on assessment  - Modify environment to reduce risk of injury  - Consider OT/PT consult to assist with strengthening/mobility  Outcome: Progressing     Problem: Knowledge Deficit  Goal: Patient/family/caregiver demonstrates understanding of disease process, treatment plan, medications, and discharge instructions  Description  Complete learning assessment and assess knowledge base    Interventions:  - Provide teaching at level of understanding  - Provide teaching via preferred learning methods  Outcome: Progressing

## 2019-08-14 ENCOUNTER — HOSPITAL ENCOUNTER (OUTPATIENT)
Dept: RADIOLOGY | Facility: HOSPITAL | Age: 84
Discharge: HOME | End: 2019-08-14
Payer: MEDICARE

## 2019-08-14 DIAGNOSIS — R13.14 PHARYNGOESOPHAGEAL DYSPHAGIA: ICD-10-CM

## 2019-08-14 PROCEDURE — 74230 X-RAY XM SWLNG FUNCJ C+: CPT

## 2019-08-14 PROCEDURE — 92611 MOTION FLUOROSCOPY/SWALLOW: CPT

## 2019-08-14 NOTE — PROCEDURES
Video Swallow Study      Patient Name: Lamonte Spencer  HCQJC'E Date: 8/14/2019        Past Medical History  Past Medical History:   Diagnosis Date    AVM (arteriovenous malformation)     stomach    Breast cancer ([facility] )     Cardiac pacemaker     Complete heart block (HCC)     Gastric ulcer with hemorrhage     GERD (gastroesophageal reflux disease)     Hearing loss     Heart block     S/P pacemaker     Herpes zoster     last assessed 7/30/13    Hyperlipidemia     Hypertension     Insomnia     last assessed 7/30/13, resolved 6/16/15    Lyme disease     last assessed 7/1/13    Malignant neoplasm of cervix ([facility] )     last assessed 6/27/12, resolved 2/1/17     Osteoporosis     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 ([facility] )     last assessed 6/27/12    Use of anastrozole (Arimidex)     took x 5yrs, last assessed 11/19/17         Past Surgical History  Past Surgical History:   Procedure Laterality Date    BREAST BIOPSY  10/05/2009    percutaneous needle core    BREAST SURGERY Right 11/13/2009    Lumpectomy     CARDIAC PACEMAKER PLACEMENT      dual chamber, last assessed 2/26/16    CATARACT EXTRACTION, BILATERAL      DENTAL SURGERY      ESOPHAGOGASTRODUODENOSCOPY N/A 2/3/2017    Procedure: ESOPHAGOGASTRODUODENOSCOPY (EGD): FOREIGN BODY REMOVAL;  Surgeon: Michael Denson MD;  [facility];  Service:     ESOPHAGOGASTRODUODENOSCOPY N/A 8/2/2018    Procedure: ESOPHAGOGASTRODUODENOSCOPY (EGD); Surgeon: Michael Denson MD;  [facility];  Service: Gastroenterology    HERNIA REPAIR Right 01/28/2015    Inguinal, with mesh    HERNIA REPAIR Right 4/19/2019    Procedure: Repair of right incarcerated femoral hernia    ;   Surgeon: Kee Cavanaugh MD;  [facility];  Service: General    HYSTERECTOMY      KNEE SURGERY      resolved 1999    LYMPHADENECTOMY  11/13/2009    Axillary     OK ESOPHAGOGASTRODUODENOSCOPY TRANSORAL DIAGNOSTIC N/A 11/7/2018    Procedure: ESOPHAGOGASTRODUODENOSCOPY (EGD); Surgeon: Gwen Mojica MD;  [facility];  Service: Gastroenterology    SENTINEL LYMPH NODE BIOPSY Right 11/13/2009       Ms Rylee Meng is a 79 y/o f referred for a VBS to assess her swallow function  She has c/o a gurgling w/ thin & food & liquids not going down or getting very stuck  She lives at home w/ her daughter  She has not had any PNA or URIs  Video Barium Swallow Study    Summary:  Pt presents w/ mild/mod oropharyngeal dysphagia, severe esophageal dysphagia alfredo by reduced bolus manipulation & transfer, reduced hyolaryngeal elevation & airway protection during the swallow as well as prior to the swallow  There is spill & penetration & aspiration prior to swallows during mastication w/ solids  The pt can clear  Noted severe esophageal dysmotility w/ poor clearing, liquids do no clear-they make her belch more  Noted significant stasis even after only a little bit  Images are on PACS for review  Recommendations:  Diet: soft solids- close to level 2, try to avoid mixed substances  Liquids:thin, cue for small sips, pt takes large amts  Meds: would crush or place whole in puree  Strategies: alternate bites w/ sips, several small meals a day w/ strict reflux precautions  Upright position  F/u ST tx: may benefit from some in the home  Therapy Prognosis: fair  Prognosis considerations:pt & family understanding of deficits  Full Supervision  Aspiration Precautions  Reflux Precautions  Consider consult with: GI  Results reviewed with: pt,  Family-attempted w/ pt's dtr however her car was running in the front of the building  ? Full understanding         Previous VBS:  -  Current Diet:   Regular w/ thin- states she eats very soft foods, tried doing ensure but is unable  Dentition:  Upper & lower dentures, pt states she lost weight they might be loose  O2 requirement:  RA  Oral mech:  Strength and ROM: fair, no asymmetry     Vocal Quality/Speech:  Mild hoarseness  Cognitive status:  Alert, cooperative    Consistencies administered: Barium laden applesauce, banana, chicken, cookie, thin liquids  Liquids were administered by cup  Pt was seated laterally at 90 degrees  Oral stage:    Lip closure: wfl  Mastication: severely prolonged, mildly munching  Bolus formation: mild/mod reduced  Bolus control: loss over BOT w/ material during mastication, thin if large sips  Transfer: piecemeal w/ solids, rapid w/ thin  Residue: mild w/ solids    Pharyngeal stage:    Swallow promptness: fairly prompt to mild/mod delayed w/ the liquids & w/ spill from oral cavity  Spill to valleculae: with most material  Spill to pyriforms: with larger amts  Epiglottic inversion: min movement, curved into the tongue  Laryngeal excursion: reduced anteriorly  Pharyngeal constriction: mild/mod reduced airway protection  Vallecular retention: mild w/ harder solids, able to swallow to clear  Pyriform retention:  PPW coating:-  Osteophytes:-  CP prominence:-  Retropulsion from prominence:-  Transient penetration: with thin by cup for large sip  Epiglottic undercoat: prior to the swallow, during mastication of solids  Penetration: w/ solids prior to the swallow  Aspiration: during & after swallows w/ solids as he is masticating  Strategies: attempted softer material, mult swallows  Pt is able to cough & clear     Response to aspiration: mild throat clearing noted, pt able to expel material w/ cough    Screening of Esophageal stage:    Dysmotility: noted throughout, does not improve w/ liquids to attempt to clear  Retropulsion: there is some mild movement within, reflux noted, up to the cervical esophagus but not beyond  Tertiary contractions:  Retention: severe

## 2019-08-27 ENCOUNTER — TELEPHONE (OUTPATIENT)
Dept: HEMATOLOGY ONCOLOGY | Facility: CLINIC | Age: 84
End: 2019-08-27

## 2019-08-27 NOTE — TELEPHONE ENCOUNTER
----- Message from Germán Moraes, RN sent at 8/27/2019  3:50 PM EDT -----  Regarding: RE: infusion appointment  Yes she does  ----- Message -----  From: Felicitas Arcos  Sent: 8/27/2019  12:09 PM EDT  To: Germán Moraes, RN  Subject: infusion appointment                             Is patient continuing with her infusion?  She has nothing scheduled and I don't see anything in her appointment desk

## 2019-08-27 NOTE — TELEPHONE ENCOUNTER
Spoke with patient to inform her of her Faslodex appointment [email or web address]   Patient verbalized understanding

## 2019-09-03 ENCOUNTER — APPOINTMENT (OUTPATIENT)
Dept: LAB | Facility: HOSPITAL | Age: 84
End: 2019-09-03
Attending: INTERNAL MEDICINE
Payer: MEDICARE

## 2019-09-03 ENCOUNTER — TRANSCRIBE ORDERS (OUTPATIENT)
Dept: ADMINISTRATIVE | Facility: HOSPITAL | Age: 84
End: 2019-09-03

## 2019-09-03 DIAGNOSIS — C50.411 MALIGNANT NEOPLASM OF UPPER-OUTER QUADRANT OF RIGHT BREAST IN FEMALE, ESTROGEN RECEPTOR POSITIVE (HCC): Primary | ICD-10-CM

## 2019-09-03 DIAGNOSIS — Z17.0 MALIGNANT NEOPLASM OF UPPER-OUTER QUADRANT OF RIGHT BREAST IN FEMALE, ESTROGEN RECEPTOR POSITIVE (HCC): Primary | ICD-10-CM

## 2019-09-03 DIAGNOSIS — C50.411 MALIGNANT NEOPLASM OF UPPER-OUTER QUADRANT OF RIGHT BREAST IN FEMALE, ESTROGEN RECEPTOR POSITIVE (HCC): ICD-10-CM

## 2019-09-03 DIAGNOSIS — Z17.0 MALIGNANT NEOPLASM OF UPPER-OUTER QUADRANT OF RIGHT BREAST IN FEMALE, ESTROGEN RECEPTOR POSITIVE (HCC): ICD-10-CM

## 2019-09-03 LAB
ALBUMIN SERPL BCP-MCNC: 2.9 G/DL (ref 3.5–5)
ALP SERPL-CCNC: 61 U/L (ref 46–116)
ALT SERPL W P-5'-P-CCNC: 33 U/L (ref 12–78)
ANION GAP SERPL CALCULATED.3IONS-SCNC: 2 MMOL/L (ref 4–13)
AST SERPL W P-5'-P-CCNC: 61 U/L (ref 5–45)
BASOPHILS # BLD AUTO: 0.05 THOUSANDS/ΜL (ref 0–0.1)
BASOPHILS NFR BLD AUTO: 1 % (ref 0–1)
BILIRUB SERPL-MCNC: 0.5 MG/DL (ref 0.2–1)
BUN SERPL-MCNC: 17 MG/DL (ref 5–25)
CALCIUM SERPL-MCNC: 8.7 MG/DL (ref 8.3–10.1)
CANCER AG27-29 SERPL-ACNC: 45.1 U/ML (ref 0–42.3)
CHLORIDE SERPL-SCNC: 105 MMOL/L (ref 100–108)
CO2 SERPL-SCNC: 34 MMOL/L (ref 21–32)
CREAT SERPL-MCNC: 1.06 MG/DL (ref 0.6–1.3)
EOSINOPHIL # BLD AUTO: 0.3 THOUSAND/ΜL (ref 0–0.61)
EOSINOPHIL NFR BLD AUTO: 5 % (ref 0–6)
ERYTHROCYTE [DISTWIDTH] IN BLOOD BY AUTOMATED COUNT: 14.7 % (ref 11.6–15.1)
GFR SERPL CREATININE-BSD FRML MDRD: 45 ML/MIN/1.73SQ M
GLUCOSE SERPL-MCNC: 73 MG/DL (ref 65–140)
HCT VFR BLD AUTO: 37.4 % (ref 34.8–46.1)
HGB BLD-MCNC: 12.1 G/DL (ref 11.5–15.4)
IMM GRANULOCYTES # BLD AUTO: 0.03 THOUSAND/UL (ref 0–0.2)
IMM GRANULOCYTES NFR BLD AUTO: 1 % (ref 0–2)
LYMPHOCYTES # BLD AUTO: 1.55 THOUSANDS/ΜL (ref 0.6–4.47)
LYMPHOCYTES NFR BLD AUTO: 25 % (ref 14–44)
MCH RBC QN AUTO: 31.6 PG (ref 26.8–34.3)
MCHC RBC AUTO-ENTMCNC: 32.4 G/DL (ref 31.4–37.4)
MCV RBC AUTO: 98 FL (ref 82–98)
MONOCYTES # BLD AUTO: 0.89 THOUSAND/ΜL (ref 0.17–1.22)
MONOCYTES NFR BLD AUTO: 15 % (ref 4–12)
NEUTROPHILS # BLD AUTO: 3.31 THOUSANDS/ΜL (ref 1.85–7.62)
NEUTS SEG NFR BLD AUTO: 53 % (ref 43–75)
NRBC BLD AUTO-RTO: 0 /100 WBCS
PLATELET # BLD AUTO: 132 THOUSANDS/UL (ref 149–390)
PMV BLD AUTO: 9.6 FL (ref 8.9–12.7)
POTASSIUM SERPL-SCNC: 4.3 MMOL/L (ref 3.5–5.3)
PROT SERPL-MCNC: 6.7 G/DL (ref 6.4–8.2)
RBC # BLD AUTO: 3.83 MILLION/UL (ref 3.81–5.12)
SODIUM SERPL-SCNC: 141 MMOL/L (ref 136–145)
WBC # BLD AUTO: 6.13 THOUSAND/UL (ref 4.31–10.16)

## 2019-09-03 PROCEDURE — 85025 COMPLETE CBC W/AUTO DIFF WBC: CPT

## 2019-09-03 PROCEDURE — 80053 COMPREHEN METABOLIC PANEL: CPT

## 2019-09-03 PROCEDURE — 36415 COLL VENOUS BLD VENIPUNCTURE: CPT

## 2019-09-03 PROCEDURE — 86300 IMMUNOASSAY TUMOR CA 15-3: CPT

## 2019-09-05 ENCOUNTER — HOSPITAL ENCOUNTER (OUTPATIENT)
Dept: INFUSION CENTER | Facility: HOSPITAL | Age: 84
Discharge: HOME | End: 2019-09-05
Attending: INTERNAL MEDICINE
Payer: MEDICARE

## 2019-09-05 DIAGNOSIS — Z17.0 MALIGNANT NEOPLASM OF UPPER-OUTER QUADRANT OF RIGHT BREAST IN FEMALE, ESTROGEN RECEPTOR POSITIVE (HCC): Primary | ICD-10-CM

## 2019-09-05 DIAGNOSIS — C50.411 MALIGNANT NEOPLASM OF UPPER-OUTER QUADRANT OF RIGHT BREAST IN FEMALE, ESTROGEN RECEPTOR POSITIVE (HCC): Primary | ICD-10-CM

## 2019-09-05 PROCEDURE — 96402 CHEMO HORMON ANTINEOPL SQ/IM: CPT

## 2019-09-05 RX ORDER — LAMOTRIGINE 25 MG/1
500 TABLET ORAL ONCE
Status: COMPLETED | OUTPATIENT
Start: 2019-09-05 | End: 2019-09-05

## 2019-09-05 RX ORDER — LAMOTRIGINE 25 MG/1
500 TABLET ORAL ONCE
Status: CANCELLED | OUTPATIENT
Start: 2019-10-02

## 2019-09-05 RX ADMIN — FULVESTRANT 500 MG: 50 INJECTION INTRAMUSCULAR at 13:41

## 2019-09-05 NOTE — PROGRESS NOTES
Pt tolerated Faslodex injections in the right and left buttocks IM today with no adverse reactions  Left unit ambulatory with daughter via w/c

## 2019-09-05 NOTE — PLAN OF CARE
Problem: Potential for Falls  Goal: Patient will remain free of falls  Description  INTERVENTIONS:  - Assess patient frequently for physical needs  -  Identify cognitive and physical deficits and behaviors that affect risk of falls  -  [place] fall precautions as indicated by assessment   - Educate patient/family on patient safety including physical limitations  - Instruct patient to call for assistance with activity based on assessment  - Modify environment to reduce risk of injury  - Consider OT/PT consult to assist with strengthening/mobility  Outcome: Progressing     Problem: Knowledge Deficit  Goal: Patient/family/caregiver demonstrates understanding of disease process, treatment plan, medications, and discharge instructions  Description  Complete learning assessment and assess knowledge base    Interventions:  - Provide teaching at level of understanding  - Provide teaching via preferred learning methods  Outcome: Progressing

## 2019-09-09 ENCOUNTER — TELEPHONE (OUTPATIENT)
Dept: FAMILY MEDICINE CLINIC | Facility: HOSPITAL | Age: 84
End: 2019-09-09

## 2019-09-09 DIAGNOSIS — R13.14 PHARYNGOESOPHAGEAL DYSPHAGIA: ICD-10-CM

## 2019-09-09 DIAGNOSIS — R63.4 WEIGHT LOSS, UNINTENTIONAL: Primary | ICD-10-CM

## 2019-09-09 NOTE — PROGRESS NOTES
Swallowing study showed she has a sluggish esophagus that doesn't move food down to stomach as easily as it should  The therapist recommended soft solids to avoid choking  If not doing any better she should be seen by [facility], which I can enter referral if she would like

## 2019-09-09 NOTE — TELEPHONE ENCOUNTER
----- Message from Brian Lema MD sent at 9/9/2019 10:06 AM EDT -----      ----- Message -----  From: REMY Conrad  Sent: 8/14/2019  11:00 AM EDT  To: Brian Lema MD

## 2019-09-09 NOTE — PROGRESS NOTES
Results relayed to daughter and patient   They already have seen Dr Quinton Archibald, so they will f/u with them

## 2019-09-09 NOTE — TELEPHONE ENCOUNTER
I'm not sure if you can view or not  Her swallow showed sluggish esophageal motility and therapist advised soft solid diet  If still having problems advise [facility] evaluation

## 2019-09-09 NOTE — TELEPHONE ENCOUNTER
PATIENT ANXIOUS FOR BARIUM SWALLOW RESULTS DONE BACK ON 08/14/2019 - PLEASE REVIEW AND CALL BACK ASAP

## 2019-09-09 NOTE — TELEPHONE ENCOUNTER
I relayed results to daughter and pt - they have seen Dr Cyndi Crigler at [facility] in the past - so they will f/u with them

## 2019-09-10 ENCOUNTER — TELEPHONE (OUTPATIENT)
Dept: GASTROENTEROLOGY | Facility: CLINIC | Age: 84
End: 2019-09-10

## 2019-09-10 ENCOUNTER — OFFICE VISIT (OUTPATIENT)
Dept: GASTROENTEROLOGY | Facility: CLINIC | Age: 84
End: 2019-09-10
Payer: MEDICARE

## 2019-09-10 VITALS
BODY MASS INDEX: 18.99 KG/M2 | SYSTOLIC BLOOD PRESSURE: 128 MMHG | HEART RATE: 82 BPM | WEIGHT: 88 LBS | DIASTOLIC BLOOD PRESSURE: 72 MMHG | HEIGHT: 57 IN

## 2019-09-10 DIAGNOSIS — R13.14 PHARYNGOESOPHAGEAL DYSPHAGIA: Primary | ICD-10-CM

## 2019-09-10 PROCEDURE — 1124F ACP DISCUSS-NO DSCNMKR DOCD: CPT | Performed by: NURSE PRACTITIONER

## 2019-09-10 PROCEDURE — 99214 OFFICE O/P EST MOD 30 MIN: CPT | Performed by: NURSE PRACTITIONER

## 2019-09-10 NOTE — PROGRESS NOTES
[id number] - Outpatient Consultation  Viktoria Mcmullen 80 y o  female MRN: [id number]  Encounter: [id number]    ASSESSMENT AND PLAN:      1  Pharyngoesophageal dysphagia  Progressive solid-food dysphagia for over the past year with occasional odynophagia and occasional dysphagia to liquids  Associated 20 lb weight loss  Possibilities include an esophageal stricture, esophageal ring or possibly extrinsic compression from metastatic breast cancer on the esophagus  A swallowing study showed dysmotility  She dinner undergo an upper endoscopy in November 2018 for follow-up to a bleeding gastric ulcer  Esophageal candidiasis and 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  This was injected and clipped with bleeding controlled  MATILDE test negative  - EGD; Future  - will arrange for an upper endoscopy as soon as possible      Followup Appointment:  4-6 weeks  ______________________________________________________________________    Chief Complaint   Patient presents with    Difficulty Swallowing     referred by Dr Raford Kawasaki    Weight Loss       HPI:   Viktoria Mcmullen is a 80y o  year old female who presents with progressive solid food dysphagia over the past year  Occasional dysphagia to liquids  Some odynophagia  Associated early satiety with a 20 lb weight loss over the past year  Occasional heartburn that is typically alleviated with daily PPI therapy  She was recently diagnosed with recurrent breast cancer and is undergoing chemotherapy  She believes her breast cancer may have metastasized but I do not currently have reports available for my review  Denies melena or rectal bleeding  Some constipation that is typically alleviated with over-the-counter stool softeners and laxatives      Historical Information   Past Medical History:   Diagnosis Date    AVM (arteriovenous malformation)     stomach    [facility] [address] [facility]     Cardiac pacemaker     [facility] heart block ([facility] )     Gastric ulcer with hemorrhage     GERD (gastroesophageal reflux disease)     Hearing loss     Heart block     S/P pacemaker     Herpes zoster     last assessed 7/30/13    Hyperlipidemia     Hypertension     Insomnia     last assessed 7/30/13, resolved 6/16/15    Lyme disease     last assessed 7/1/13    Malignant neoplasm of cervix ([facility] )     last assessed 6/27/12, resolved 2/1/17     Osteoporosis     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 ([facility] )     last assessed 6/27/12    Use of anastrozole (Arimidex)     took x 5yrs, last assessed 11/19/17      Past Surgical History:   Procedure Laterality Date    BREAST BIOPSY  10/05/2009    percutaneous needle core    BREAST SURGERY Right 11/13/2009    Lumpectomy     CARDIAC PACEMAKER PLACEMENT      dual chamber, last assessed 2/26/16    CATARACT EXTRACTION, BILATERAL      DENTAL SURGERY      ESOPHAGOGASTRODUODENOSCOPY N/A 2/3/2017    Procedure: ESOPHAGOGASTRODUODENOSCOPY (EGD): FOREIGN BODY REMOVAL;  Surgeon: Jean-Pierre Ruffin MD;  [facility];  Service:     ESOPHAGOGASTRODUODENOSCOPY N/A 8/2/2018    Procedure: ESOPHAGOGASTRODUODENOSCOPY (EGD); Surgeon: Jean-Pierre Ruffin MD;  [facility];  Service: Gastroenterology    HERNIA REPAIR Right 01/28/2015    Inguinal, with mesh    HERNIA REPAIR Right 4/19/2019    Procedure: Repair of right incarcerated femoral hernia    ; Surgeon: Alba Anderson MD;  [facility];  Service: General    HYSTERECTOMY      KNEE SURGERY      resolved 1999    LYMPHADENECTOMY  11/13/2009    Axillary     [place] ESOPHAGOGASTRODUODENOSCOPY TRANSORAL DIAGNOSTIC N/A 11/7/2018    Procedure: ESOPHAGOGASTRODUODENOSCOPY (EGD);   Surgeon: Jean-Pierre Ruffin MD;  [facility];  Service: Gastroenterology    SENTINEL LYMPH NODE BIOPSY Right 11/13/2009     Social History     Substance and Sexual Activity   Alcohol Use No    Alcohol/week: 0 0 standard drinks    Frequency: Never    Drinks per session: Patient refused    Binge frequency: Never    Comment: n/a     Social History     Substance and Sexual Activity   Drug Use No     Social History     Tobacco Use   Smoking Status Former Smoker    Packs/day: 0 50    Years: 40 00    Pack years: 20 00   Smokeless Tobacco Never Used   Tobacco Comment    quit 40 yrs ago     Family History   Problem Relation Age of Onset    Heart disease Mother     Heart disease Father        Meds/Allergies     Current Outpatient Medications:     calcium citrate-vitamin D (CITRACAL+D) 315-200 MG-UNIT per tablet    losartan (COZAAR) 50 mg tablet    metoprolol tartrate (LOPRESSOR) 50 mg tablet    Multiple Vitamins-Minerals (CENTRUM SILVER ADULT 50+ PO)    pantoprazole (PROTONIX) 40 mg tablet    pravastatin (PRAVACHOL) 20 mg tablet    zaleplon (SONATA) 5 MG capsule    fulvestrant (FASLODEX) 250 mg/5 mL    LORazepam (ATIVAN) 0 5 mg tablet    montelukast (SINGULAIR) 10 mg tablet    nystatin (MYCOSTATIN) 100,000 units/mL suspension    Current Facility-Administered Medications:     aluminum-magnesium hydroxide-simethicone (MYLANTA) 200-200-20 mg/5 mL oral suspension 30 mL, 30 mL, Oral, Q4H PRN, 30 mL at 02/10/18 1406    lidocaine viscous (XYLOCAINE) 2 % mucosal solution 15 mL, 15 mL, Swish & Spit, 4x Daily PRN, 15 mL at 02/10/18 1407    No Known Allergies    PHYSICAL EXAM:    Blood pressure 128/72, pulse 82, height 4' 9" (1 448 m), weight 39 9 kg (88 lb)  Body mass index is 19 04 kg/m²  General Appearance: NAD, cooperative, alert, thin appearing  Eyes: Anicteric, PERRLA, EOMI  ENT:  Normocephalic, atraumatic, normal mucosa  Neck:  Supple, symmetrical, trachea midline  Resp:  Clear to auscultation bilaterally; no rales, rhonchi or wheezing; respirations unlabored   CV:  S1 S2, Regular rate and rhythm; no murmur, rub, or gallop  GI:  Soft, non-tender, non-distended; normal bowel sounds; no masses, no organomegaly   Rectal: Deferred  :  Deferred   Musculoskeletal: No cyanosis, clubbing or edema  Normal ROM  Skin:  No jaundice, rashes, or lesions   Heme/Lymph: No palpable cervical lymphadenopathy  Psych: Normal affect, good eye contact  Neuro: No gross deficits, AAOx3    Lab Results:   Lab Results   Component Value Date    WBC 6 13 09/03/2019    HGB 12 1 09/03/2019    HCT 37 4 09/03/2019    MCV 98 09/03/2019     (L) 09/03/2019     Lab Results   Component Value Date     07/13/2015    K 4 3 09/03/2019     09/03/2019    CO2 34 (H) 09/03/2019    ANIONGAP 7 07/13/2015    BUN 17 09/03/2019    CREATININE 1 06 09/03/2019    GLUCOSE 96 07/13/2015    GLUF 68 05/31/2019    CALCIUM 8 7 09/03/2019    AST 61 (H) 09/03/2019    ALT 33 09/03/2019    ALKPHOS 61 09/03/2019    PROT 7 7 07/13/2015    BILITOT 0 7 07/13/2015    EGFR 45 09/03/2019     Lab Results   Component Value Date    IRON 62 12/06/2018    TIBC 260 12/06/2018    FERRITIN 72 12/06/2018     Lab Results   Component Value Date    LIPASE 777 (H) 04/18/2019       Radiology Results:   Fl Barium Swallow Video W Speech    Result Date: 8/14/2019  Narrative: A video barium swallow study was performed by the Department of Speech Pathology  Please refer to the report for the official interpretation  The images are stored for archival purposes only  Study images were not formally reviewed by the Radiology Department      Fl Barium Swallow Video W Speech    Result Date: 8/14/2019  Narrative: REMY Adkins     8/14/2019 10:59 AM                                  Video Swallow Study Patient Name: Mat Painter Today's Date: 8/14/2019   Past Medical History Past Medical History: Diagnosis Date  AVM (arteriovenous malformation)   stomach  Breast cancer ([address] )   Cardiac pacemaker   Complete heart block ([facility])   Gastric ulcer with hemorrhage   GERD (gastroesophageal reflux disease)   Hearing loss   Heart block   S/P pacemaker   Herpes zoster   last assessed 7/30/13  Hyperlipidemia   Hypertension   Insomnia   last assessed 7/30/13, resolved 6/16/15  Lyme disease   last assessed 7/1/13  Malignant neoplasm of cervix ([facility] )   last assessed 6/27/12, resolved 2/1/17   Osteoporosis   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 ([facility] [address] )   last assessed 6/27/12  Use of anastrozole (Arimidex)   took x 5yrs, last assessed 11/19/17   Past Surgical History Past Surgical History: Procedure Laterality Date  BREAST BIOPSY  10/05/2009  percutaneous needle core  BREAST SURGERY Right 11/13/2009  Lumpectomy   CARDIAC PACEMAKER PLACEMENT    dual chamber, last assessed 2/26/16  CATARACT EXTRACTION, BILATERAL    DENTAL SURGERY    ESOPHAGOGASTRODUODENOSCOPY N/A 2/3/2017  Procedure: ESOPHAGOGASTRODUODENOSCOPY (EGD): FOREIGN BODY REMOVAL;  Surgeon: Nikki Matias MD;  [facility];  Service:   ESOPHAGOGASTRODUODENOSCOPY N/A 8/2/2018  Procedure: ESOPHAGOGASTRODUODENOSCOPY (EGD); Surgeon: Nikki Matias MD;  [facility];  Service: Gastroenterology  HERNIA REPAIR Right 01/28/2015  Inguinal, with mesh  HERNIA REPAIR Right 4/19/2019  Procedure: Repair of right incarcerated femoral hernia    ; Surgeon: Sofía Lewis MD;  [facility];  Service: General  HYSTERECTOMY    KNEE SURGERY    resolved 1999  LYMPHADENECTOMY  11/13/2009  Axillary   [place] ESOPHAGOGASTRODUODENOSCOPY TRANSORAL DIAGNOSTIC N/A 11/7/2018  Procedure: ESOPHAGOGASTRODUODENOSCOPY (EGD); Surgeon: Nikki Matias MD;  [facility];  Service: Gastroenterology  SENTINEL LYMPH NODE BIOPSY Right 11/13/2009 Ms Jaswinder Genao is a 81 y/o f referred for a VBS to assess her swallow function  She has c/o a gurgling w/ thin & food & liquids not going down or getting very stuck  She lives at home w/ her daughter  She has not had any PNA or URIs  Video Barium Swallow Study Summary: Pt presents w/ mild/mod oropharyngeal dysphagia, severe esophageal dysphagia alfredo by reduced bolus manipulation & transfer, reduced hyolaryngeal elevation & airway protection during the swallow as well as prior to the swallow    There is spill & penetration & aspiration prior to swallows during mastication w/ solids  The pt can clear  Noted severe esophageal dysmotility w/ poor clearing, liquids do no clear-they make her belch more  Noted significant stasis even after only a little bit  Images are on PACS for review  Recommendations: Diet: soft solids- close to level 2, try to avoid mixed substances Liquids:thin, cue for small sips, pt takes large amts Meds: would crush or place whole in puree Strategies: alternate bites w/ sips, several small meals a day w/ strict reflux precautions  Upright position F/u ST tx: may benefit from some in the home  Therapy Prognosis: fair Prognosis considerations:pt & family understanding of deficits Full Supervision Aspiration Precautions Reflux Precautions Consider consult with: GI Results reviewed with: pt,  Family-attempted w/ pt's dtr however her car was running in the front of the building  ? Full understanding  Previous VBS: - Current Diet:  Regular w/ thin- states she eats very soft foods, tried doing ensure but is unable Dentition: Upper & lower dentures, pt states she lost weight they might be loose O2 requirement: RA Oral mech: Strength and ROM: fair, no asymmetry Vocal Quality/Speech: Mild hoarseness Cognitive status: Alert, cooperative Consistencies administered: Barium laden applesauce, banana, chicken, cookie, thin liquids  Liquids were administered by cup  Pt was seated laterally at 90 degrees   Oral stage: Lip closure: wfl Mastication: severely prolonged, mildly munching Bolus formation: mild/mod reduced Bolus control: loss over BOT w/ material during mastication, thin if large sips Transfer: piecemeal w/ solids, rapid w/ thin Residue: mild w/ solids Pharyngeal stage: Swallow promptness: fairly prompt to mild/mod delayed w/ the liquids & w/ spill from oral cavity Spill to valleculae: with most material Spill to pyriforms: with larger amts Epiglottic inversion: min movement, curved into the tongue Laryngeal excursion: reduced anteriorly Pharyngeal constriction: mild/mod reduced airway protection Vallecular retention: mild w/ harder solids, able to swallow to clear Pyriform retention: PPW coating:- Osteophytes:- CP prominence:- Retropulsion from prominence:- Transient penetration: with thin by cup for large sip Epiglottic undercoat: prior to the swallow, during mastication of solids Penetration: w/ solids prior to the swallow Aspiration: during & after swallows w/ solids as he is masticating Strategies: attempted softer material, mult swallows  Pt is able to cough & clear  Response to aspiration: mild throat clearing noted, pt able to expel material w/ cough Screening of Esophageal stage: Dysmotility: noted throughout, does not improve w/ liquids to attempt to clear Retropulsion: there is some mild movement within, reflux noted, up to the cervical esophagus but not beyond Tertiary contractions: Retention: severe       REVIEW OF SYSTEMS:    CONSTITUTIONAL: Denies any fever, chills, rigors  Positive for fatigue and weight loss  HEENT: No earache or tinnitus  Denies hearing loss or visual disturbances  Positive for sore throat  CARDIOVASCULAR: No chest pain or palpitations  Chest pain with exertion  RESPIRATORY:  Positive for cough, shortness of breath at rest and shortness of breath with exertion, hemoptysis, shortness of breath or dyspnea on exertion  GASTROINTESTINAL: As noted in the History of Present Illness  GENITOURINARY: No problems with urination  Denies any hematuria or dysuria  NEUROLOGIC: No dizziness or vertigo, denies headaches  Admits to loss of strength and numbness and tingling  MUSCULOSKELETAL:  Painful joints and swollen joints  SKIN: Denies skin rashes or itching  Admits to easy bruising positive for discoloration  ENDOCRINE: Denies excessive thirst  Denies intolerance to heat or cold  PSYCHOSOCIAL: Denies depression or anxiety  Denies any recent memory loss    Admits to insomnia

## 2019-09-10 NOTE — TELEPHONE ENCOUNTER
Patient scheduled for EGD with dilation 09/11/19 at [facility]  Bren aware   Patient had prep

## 2019-09-10 NOTE — TELEPHONE ENCOUNTER
Spoke to pt; she will call to reschedule pacemaker remote read that was for tomorrow as she will be at [facility] for EGD;

## 2019-09-11 ENCOUNTER — ANESTHESIA EVENT (OUTPATIENT)
Dept: GASTROENTEROLOGY | Facility: HOSPITAL | Age: 84
End: 2019-09-11

## 2019-09-11 ENCOUNTER — HOSPITAL ENCOUNTER (OUTPATIENT)
Dept: GASTROENTEROLOGY | Facility: HOSPITAL | Age: 84
Setting detail: OUTPATIENT SURGERY
Discharge: HOME | End: 2019-09-11
Attending: INTERNAL MEDICINE | Admitting: INTERNAL MEDICINE
Payer: MEDICARE

## 2019-09-11 ENCOUNTER — ANESTHESIA (OUTPATIENT)
Dept: GASTROENTEROLOGY | Facility: HOSPITAL | Age: 84
End: 2019-09-11

## 2019-09-11 VITALS
HEART RATE: 80 BPM | WEIGHT: 88 LBS | BODY MASS INDEX: 18.99 KG/M2 | HEIGHT: 57 IN | SYSTOLIC BLOOD PRESSURE: 188 MMHG | DIASTOLIC BLOOD PRESSURE: 81 MMHG | RESPIRATION RATE: 20 BRPM | TEMPERATURE: 98 F | OXYGEN SATURATION: 95 %

## 2019-09-11 DIAGNOSIS — R13.14 PHARYNGOESOPHAGEAL DYSPHAGIA: ICD-10-CM

## 2019-09-11 PROCEDURE — 88342 IMHCHEM/IMCYTCHM 1ST ANTB: CPT | Performed by: PATHOLOGY

## 2019-09-11 PROCEDURE — 88341 IMHCHEM/IMCYTCHM EA ADD ANTB: CPT | Performed by: PATHOLOGY

## 2019-09-11 PROCEDURE — 43450 DILATE ESOPHAGUS 1/MULT PASS: CPT | Performed by: INTERNAL MEDICINE

## 2019-09-11 PROCEDURE — 43239 EGD BIOPSY SINGLE/MULTIPLE: CPT | Performed by: INTERNAL MEDICINE

## 2019-09-11 PROCEDURE — 88305 TISSUE EXAM BY PATHOLOGIST: CPT | Performed by: PATHOLOGY

## 2019-09-11 RX ORDER — PROPOFOL 10 MG/ML
INJECTION, EMULSION INTRAVENOUS AS NEEDED
Status: DISCONTINUED | OUTPATIENT
Start: 2019-09-11 | End: 2019-09-11 | Stop reason: SURG

## 2019-09-11 RX ORDER — SODIUM CHLORIDE 9 MG/ML
INJECTION, SOLUTION INTRAVENOUS CONTINUOUS PRN
Status: DISCONTINUED | OUTPATIENT
Start: 2019-09-11 | End: 2019-09-11 | Stop reason: SURG

## 2019-09-11 RX ADMIN — PROPOFOL 30 MG: 10 INJECTION, EMULSION INTRAVENOUS at 08:02

## 2019-09-11 RX ADMIN — SODIUM CHLORIDE: 0.9 INJECTION, SOLUTION INTRAVENOUS at 07:58

## 2019-09-11 RX ADMIN — PROPOFOL 20 MG: 10 INJECTION, EMULSION INTRAVENOUS at 08:05

## 2019-09-11 NOTE — ANESTHESIA POSTPROCEDURE EVALUATION
Post-Op Assessment Note    CV Status:  Stable  Pain Score: 0    Pain management: adequate     Mental Status:  Alert   Hydration Status:  Stable   PONV Controlled:  None    Staff: Anesthesiologist           BP      Temp      Pulse     Resp      SpO2

## 2019-09-11 NOTE — H&P
H and P unchanged from September 10, 2019  Vital signs stable  Chest clear to auscultation  Heart regular rate and rhythm

## 2019-09-13 ENCOUNTER — REMOTE DEVICE CLINIC VISIT (OUTPATIENT)
Dept: CARDIOLOGY CLINIC | Facility: CLINIC | Age: 84
End: 2019-09-13
Payer: MEDICARE

## 2019-09-13 DIAGNOSIS — Z95.0 PRESENCE OF CARDIAC PACEMAKER: Primary | ICD-10-CM

## 2019-09-13 PROCEDURE — 93296 REM INTERROG EVL PM/IDS: CPT | Performed by: INTERNAL MEDICINE

## 2019-09-13 PROCEDURE — 93294 REM INTERROG EVL PM/LDLS PM: CPT | Performed by: INTERNAL MEDICINE

## 2019-09-13 NOTE — PROGRESS NOTES
Results for orders placed or performed in visit on 09/13/19   Cardiac EP device report    Narrative    MDT-DUAL CHAMBER PPM (DVIR MODE)  CARELINK TRANSMISSION: BATTERY VOLTAGE ADEQUATE (7 5 YRS)  AP: 100%  : 99 8% (>40%~DEPENDENT)  ALL AVAILABLE LEAD PARAMETERS WITHIN NORMAL LIMITS  NO SIGNIFICANT HIGH RATE EPISODES  PACEMAKER FUNCTIONING APPROPRIATELY    [address] [place] [address]

## 2019-09-16 ENCOUNTER — TELEPHONE (OUTPATIENT)
Dept: GASTROENTEROLOGY | Facility: CLINIC | Age: 84
End: 2019-09-16

## 2019-09-20 ENCOUNTER — TELEPHONE (OUTPATIENT)
Dept: HEMATOLOGY ONCOLOGY | Facility: CLINIC | Age: 84
End: 2019-09-20

## 2019-09-20 NOTE — TELEPHONE ENCOUNTER
Called and spoke with Claudette (pt's daughter) and she stated that she did not want the pt to know what stage her stomach cancer was if she asked for fear of damaging her mentally and emotionally  Claudette stated that the pt just received bad news over the phone that she had stomach cancer and does not want the pt to mentally "check out" since the pt is 89yrs old  Claudette also wishes for us not to mention that she called about this

## 2019-09-20 NOTE — TELEPHONE ENCOUNTER
Claudette called and wanted to talk to someone about her MOM Christine Neal  She would like to talk to someone without her mom knowing she called  She has a few questions  Please call her back at [phone]  PLease do not call the home phone number  She would like DR Lynn Boston to call her

## 2019-09-23 NOTE — TELEPHONE ENCOUNTER
Patients daughter is aware we are not able to withhold this information from the patient  Dr Leif Jones will discuss further at the follow up visit

## 2019-09-25 ENCOUNTER — OFFICE VISIT (OUTPATIENT)
Dept: HEMATOLOGY ONCOLOGY | Facility: HOSPITAL | Age: 84
End: 2019-09-25
Payer: MEDICARE

## 2019-09-25 VITALS
WEIGHT: 90.8 LBS | HEIGHT: 55 IN | HEART RATE: 86 BPM | OXYGEN SATURATION: 92 % | DIASTOLIC BLOOD PRESSURE: 66 MMHG | TEMPERATURE: 95 F | BODY MASS INDEX: 21.02 KG/M2 | SYSTOLIC BLOOD PRESSURE: 130 MMHG | RESPIRATION RATE: 16 BRPM

## 2019-09-25 DIAGNOSIS — Z17.0 MALIGNANT NEOPLASM OF UPPER-OUTER QUADRANT OF RIGHT BREAST IN FEMALE, ESTROGEN RECEPTOR POSITIVE (HCC): Primary | ICD-10-CM

## 2019-09-25 DIAGNOSIS — C50.411 MALIGNANT NEOPLASM OF UPPER-OUTER QUADRANT OF RIGHT BREAST IN FEMALE, ESTROGEN RECEPTOR POSITIVE (HCC): Primary | ICD-10-CM

## 2019-09-25 PROCEDURE — 99214 OFFICE O/P EST MOD 30 MIN: CPT | Performed by: INTERNAL MEDICINE

## 2019-09-25 NOTE — PROGRESS NOTES
Hematology/Oncology Outpatient Follow- up Note  Sammy Barboza 80 y o  female MRN: @ Encounter: [id number]        Date:  9/25/2019    Presenting Complaint/Diagnosis : Metastatic breast cancer    HPI:    Josh Meléndez is seen for initial consultation 7/30/2018 regarding Newly diagnosed metastatic breast cancer  Per the record she was diagnosed with a T2 N3 M0 right-sided breast cancer in 2009  She had a lumpectomy and axillary dissection in November 2009 followed by hormonal therapy and radiation  The patient herself does not remember being on hormonal therapy but per the records she was on this  Regardless more recently she was found to have some lesions An outside facility where she had a biopsy done of the skin lesion in her left neck which actually revealed metastatic carcinoma  It was consistent with her breast primary but estrogen staining and HER-2 staining was not done and we will order this  A CT scan was done which did not show major visceral disease  She does however have lesions on her right breast        Previous Hematologic/ Oncologic History:       Malignant neoplasm of upper-outer quadrant of right breast in female, estrogen receptor positive ([facility] [address] )    11/13/2009 Surgery     Right breast lumpectomy, SLNB, AND      2010 -  Radiation     WBRT,  [facility]      8/31/2017 Initial Diagnosis     Malignant neoplasm of upper-outer quadrant of right breast in female, estrogen receptor positive ([address] )       Hormone Therapy     Arimidex X 5 years         Current Hematologic/ Oncologic Treatment:      Faslodex    Interval History:    The patient returns for follow-up visit  She has HER-2 negative ER positive OK positive disease and is currently on Faslodex  The nodules in her breast continues to shrink and it appears she has had a nice response to treatment  However she had an EGD done recently for dysphagia with an esophageal dilatation  Disease was seen in her stomach and biopsy revealed breast cancer   Again she states after her dilatation she has been eating better  Based on this we will continue her on her current therapy and she is refusing to go on anything else as she is 80years of age and is afraid of side effects  She does have some hot flashes with her current therapy  Otherwise denies any nausea denies any vomiting denies any diarrhea  Has gained a pound since her procedure  The rest of her 14 point review of systems today was negative  Cancer Staging:  Cancer Staging  Malignant neoplasm of upper-outer quadrant of right breast in female, estrogen receptor positive ([facility] [address] )  Staging form: Breast, AJCC 7th Edition  - Pathologic: Stage IIIC (T2, N3, cM0) - Signed by Ayush Brink MD on 7/19/2018  Laterality: Right  Histologic grade (G): G2  Estrogen receptor status: Positive  Percentage of positive estrogen receptors (%): 80  Progesterone receptor status: Positive  Percentage of positive progesterone receptors (%): 70  HER2 status: Negative  - Pathologic: Stage IV (M1) - Signed by Ayush Brink MD on 9/6/2018  Biopsy of metastatic site performed: Yes  Source of metastatic specimen: Skin    Test Results:    Imaging: No results found      Labs:   Lab Results   Component Value Date    WBC 6 13 09/03/2019    HGB 12 1 09/03/2019    HCT 37 4 09/03/2019    MCV 98 09/03/2019     (L) 09/03/2019     Lab Results   Component Value Date     07/13/2015    K 4 3 09/03/2019     09/03/2019    CO2 34 (H) 09/03/2019    ANIONGAP 7 07/13/2015    BUN 17 09/03/2019    CREATININE 1 06 09/03/2019    GLUCOSE 96 07/13/2015    GLUF 68 05/31/2019    CALCIUM 8 7 09/03/2019    AST 61 (H) 09/03/2019    ALT 33 09/03/2019    ALKPHOS 61 09/03/2019    PROT 7 7 07/13/2015    BILITOT 0 7 07/13/2015    EGFR 45 09/03/2019       Lab Results   Component Value Date    IRON 62 12/06/2018    TIBC 260 12/06/2018    FERRITIN 72 12/06/2018     ROS: As stated in the history of present illness otherwise his 14 point review of systems today was negative        Active Problems:   Patient Active Problem List   Diagnosis    Adjustment disorder with anxiety    AV block, complete ([facility] [address] )    GERD without esophagitis    Hyperglycemia    Mixed hyperlipidemia    Essential hypertension    Impingement syndrome of left shoulder    Malignant neoplasm of upper-outer quadrant of right breast in female, estrogen receptor positive ([facility] [address] )    Onychomycosis    Osteoporosis    Pruritus    Tinea pedis of both feet    Urinary urgency    Esophageal stenosis    Tick bite    Skin lesion of neck    Nausea    Cancer, metastatic to skin (HCC)    Pseudogout of right knee    Gastroesophageal reflux disease with esophagitis    Contusion of foot    Encounter for screening for osteoporosis    Use of fulvestrant (Faslodex)    Bronchitis, acute, with bronchospasm    Small bowel obstruction ([facility] [address] )    Weight loss, unintentional    Weight loss    Pharyngoesophageal dysphagia       Past Medical History:   Past Medical History:   Diagnosis Date    AVM (arteriovenous malformation)     stomach    [facility]     Cardiac pacemaker     Complete heart block (HCC)     Gastric ulcer with hemorrhage     GERD (gastroesophageal reflux disease)     Hearing loss     Heart block     S/P pacemaker     Herpes zoster     last assessed 7/30/13    Hyperlipidemia     Hypertension     Insomnia     last assessed 7/30/13, resolved 6/16/15    Lyme disease     last assessed 7/1/13    Malignant neoplasm of cervix ([facility] )     last assessed 6/27/12, resolved 2/1/17     Osteoporosis     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 ([facility] )     last assessed 6/27/12    Use of anastrozole (Arimidex)     took x 5yrs, last assessed 11/19/17        Surgical History:   Past Surgical History:   Procedure Laterality Date    BREAST BIOPSY  10/05/2009    percutaneous needle core    BREAST SURGERY Right 11/13/2009    Lumpectomy     CARDIAC PACEMAKER PLACEMENT      dual chamber, last assessed 2/26/16    CATARACT EXTRACTION, BILATERAL      DENTAL SURGERY      ESOPHAGOGASTRODUODENOSCOPY N/A 2/3/2017    Procedure: ESOPHAGOGASTRODUODENOSCOPY (EGD): FOREIGN BODY REMOVAL;  Surgeon: Shari Hills MD;  [facility];  Service:     ESOPHAGOGASTRODUODENOSCOPY N/A 8/2/2018    Procedure: ESOPHAGOGASTRODUODENOSCOPY (EGD); Surgeon: Shari Hills MD;  [facility];  Service: Gastroenterology    HERNIA REPAIR Right 01/28/2015    Inguinal, with mesh    HERNIA REPAIR Right 4/19/2019    Procedure: Repair of right incarcerated femoral hernia    ; Surgeon: Reji Persaud MD;  [facility];  Service: General    HYSTERECTOMY      KNEE SURGERY      resolved 1999    LYMPHADENECTOMY  11/13/2009    Axillary     WY ESOPHAGOGASTRODUODENOSCOPY TRANSORAL DIAGNOSTIC N/A 11/7/2018    Procedure: ESOPHAGOGASTRODUODENOSCOPY (EGD); Surgeon: Shari Hills MD;  [facility];  Service: Gastroenterology    SENTINEL LYMPH NODE BIOPSY Right 11/13/2009       Family History:    Family History   Problem Relation Age of Onset    Heart disease Mother     Heart disease Father        Cancer-related family history is not on file  Social History:   Social History     Socioeconomic History    Marital status:       Spouse name: Not on file    Number of children: Not on file    Years of education: Not on file    Highest education level: Not on file   Occupational History    Not on file   Social Needs    Financial resource strain: Not on file    Food insecurity:     Worry: Not on file     Inability: Not on file    Transportation needs:     Medical: Not on file     Non-medical: Not on file   Tobacco Use    Smoking status: Former Smoker     Packs/day: 0 50     Years: 40 00     Pack years: 20 00    Smokeless tobacco: Never Used    Tobacco comment: quit 40 yrs ago   Substance and Sexual Activity    Alcohol use: No     Alcohol/week: 0 0 standard drinks     Frequency: Never     Drinks per session: Patient refused     Binge frequency: Never     Comment: n/a    Drug use: No    Sexual activity: Never   Lifestyle    Physical activity:     Days per week: Not on file     Minutes per session: Not on file    Stress: Not on file   Relationships    Social connections:     Talks on phone: Not on file     Gets together: Not on file     Attends Sabianist service: Not on file     Active member of club or organization: Not on file     Attends meetings of clubs or organizations: Not on file     Relationship status: Not on file    Intimate partner violence:     Fear of current or ex partner: Not on file     Emotionally abused: Not on file     Physically abused: Not on file     Forced sexual activity: Not on file   Other Topics Concern    Not on file   Social History Narrative    Always uses seat belt        Current Medications:   Current Outpatient Medications   Medication Sig Dispense Refill    calcium citrate-vitamin D (CITRACAL+D) 315-200 MG-UNIT per tablet Take by mouth      fulvestrant (FASLODEX) 250 mg/5 mL Inject 500 mg into a muscle once      losartan (COZAAR) 50 mg tablet Take 1 tablet (50 mg total) by mouth daily 90 tablet 2    metoprolol tartrate (LOPRESSOR) 50 mg tablet Take 1 tablet (50 mg total) by mouth 2 (two) times a day 180 tablet 2    montelukast (SINGULAIR) 10 mg tablet Take 1 tablet (10 mg total) by mouth daily at bedtime 15 tablet 0    Multiple Vitamins-Minerals (CENTRUM SILVER ADULT 50+ PO) Take by mouth      nystatin (MYCOSTATIN) 100,000 units/mL suspension Apply 5 mL (500,000 Units total) to the mouth or throat 4 (four) times a day 60 mL 0    pantoprazole (PROTONIX) 40 mg tablet Take 1 tablet (40 mg total) by mouth 2 (two) times a day before meals 180 tablet 2    pravastatin (PRAVACHOL) 20 mg tablet Take 1 tablet (20 mg total) by mouth daily 90 tablet 2    zaleplon (SONATA) 5 MG capsule Take 1 capsule (5 mg total) by mouth daily at bedtime 30 capsule 0    LORazepam (ATIVAN) 0 5 mg tablet Take 1 tablet (0 5 mg total) by mouth every 6 (six) hours as needed for anxiety for up to 2 days 6 tablet 0     Current Facility-Administered Medications   Medication Dose Route Frequency Provider Last Rate Last Dose    aluminum-magnesium hydroxide-simethicone (MYLANTA) 200-200-20 mg/5 mL oral suspension 30 mL  30 mL Oral Q4H PRN Devi Guadalupe MD   30 mL at 02/10/18 1406    lidocaine viscous (XYLOCAINE) 2 % mucosal solution 15 mL  15 mL Swish & Spit 4x Daily PRN Devi Guadalupe MD   15 mL at 02/10/18 1407       Allergies: No Known Allergies    Physical Exam:    Body surface area is 1 25 meters squared  Wt Readings from Last 3 Encounters:   09/25/19 41 2 kg (90 lb 12 8 oz)   09/11/19 39 9 kg (88 lb)   09/10/19 39 9 kg (88 lb)        Temp Readings from Last 3 Encounters:   09/25/19 (!) 95 °F (35 °C) (Tympanic)   09/11/19 98 °F (36 7 °C) (Temporal)   08/07/19 97 5 °F (36 4 °C) (Tympanic)        BP Readings from Last 3 Encounters:   09/25/19 130/66   09/11/19 (!) 188/81   09/10/19 128/72         Pulse Readings from Last 3 Encounters:   09/25/19 86   09/11/19 80   09/10/19 82        Physical Exam     Constitutional   General appearance: No acute distress, well appearing and well nourished  Eyes   Conjunctiva and lids: No swelling, erythema or discharge  Pupils and irises: Equal, round and reactive to light  Ears, Nose, Mouth, and Throat   External inspection of ears and nose: Normal     Nasal mucosa, septum, and turbinates: Normal without edema or erythema  Oropharynx: Normal with no erythema, edema, exudate or lesions  Pulmonary   Respiratory effort: No increased work of breathing or signs of respiratory distress  Auscultation of lungs: Clear to auscultation  Cardiovascular   Palpation of heart: Normal PMI, no thrills  Auscultation of heart: Normal rate and rhythm, normal S1 and S2, without murmurs      Examination of extremities for edema and/or varicosities: Normal     Carotid pulses: Normal     Abdomen   Abdomen: Non-tender, no masses  Liver and spleen: No hepatomegaly or splenomegaly  Lymphatic   Palpation of lymph nodes in neck: No lymphadenopathy  Musculoskeletal   Gait and station: Normal     Digits and nails: Normal without clubbing or cyanosis  Inspection/palpation of joints, bones, and muscles: Normal     Skin   Skin and subcutaneous tissue: Normal without rashes or lesions  Neurologic   Cranial nerves: Cranial nerves 2-12 intact  Sensation: No sensory loss  Psychiatric   Orientation to person, place, and time: Normal     Mood and affect: Normal         Assessment / Plan:      The patient is a pleasant 66-year-old female with recurrence of her breast cancer  This was in the form of skin lesions and lesions on her right breast  She has ER/SC positive disease  We started her on Faslodex  She is tolerating it well  She has had a nice response where the nodules in her neck and breast have gone down  Today there were no nodules palpable in her breast  However she had some dysphagia and an EGD revealed metastatic disease in the stomach  The patient also had a outpatient  She is eating much better after her procedure according to the patient and her daughter  She is refusing any further therapy and states she just wishes to stay on Faslodex for understanding her cancer may be growing  We will respect her wishes  Her CA-27-29 has gone up slightly to the 40 range  We will continue to follow  Goals and Barriers:  Current Goal:  Prolong Survival from Breast cancer  Barriers: None  Patient's Capacity to Self Care:  Patient able to self care  Portions of the record may have been created with voice recognition software   Occasional wrong word or "sound a like" substitutions may have occurred due to the inherent limitations of voice recognition software   Read the chart carefully and recognize, using context, where substitutions have occurred

## 2019-09-30 ENCOUNTER — APPOINTMENT (OUTPATIENT)
Dept: LAB | Facility: HOSPITAL | Age: 84
End: 2019-09-30
Attending: INTERNAL MEDICINE
Payer: MEDICARE

## 2019-09-30 LAB
ALBUMIN SERPL BCP-MCNC: 3 G/DL (ref 3.5–5)
ALP SERPL-CCNC: 69 U/L (ref 46–116)
ALT SERPL W P-5'-P-CCNC: 30 U/L (ref 12–78)
ANION GAP SERPL CALCULATED.3IONS-SCNC: 7 MMOL/L (ref 4–13)
AST SERPL W P-5'-P-CCNC: 68 U/L (ref 5–45)
BASOPHILS # BLD AUTO: 0.05 THOUSANDS/ΜL (ref 0–0.1)
BASOPHILS NFR BLD AUTO: 1 % (ref 0–1)
BILIRUB SERPL-MCNC: 0.5 MG/DL (ref 0.2–1)
BUN SERPL-MCNC: 20 MG/DL (ref 5–25)
CALCIUM SERPL-MCNC: 8.5 MG/DL (ref 8.3–10.1)
CANCER AG27-29 SERPL-ACNC: 55.2 U/ML (ref 0–42.3)
CHLORIDE SERPL-SCNC: 103 MMOL/L (ref 100–108)
CO2 SERPL-SCNC: 30 MMOL/L (ref 21–32)
CREAT SERPL-MCNC: 1.07 MG/DL (ref 0.6–1.3)
EOSINOPHIL # BLD AUTO: 0.24 THOUSAND/ΜL (ref 0–0.61)
EOSINOPHIL NFR BLD AUTO: 4 % (ref 0–6)
ERYTHROCYTE [DISTWIDTH] IN BLOOD BY AUTOMATED COUNT: 14.7 % (ref 11.6–15.1)
GFR SERPL CREATININE-BSD FRML MDRD: 44 ML/MIN/1.73SQ M
GLUCOSE SERPL-MCNC: 75 MG/DL (ref 65–140)
HCT VFR BLD AUTO: 39.9 % (ref 34.8–46.1)
HGB BLD-MCNC: 12.8 G/DL (ref 11.5–15.4)
IMM GRANULOCYTES # BLD AUTO: 0.04 THOUSAND/UL (ref 0–0.2)
IMM GRANULOCYTES NFR BLD AUTO: 1 % (ref 0–2)
LYMPHOCYTES # BLD AUTO: 1.38 THOUSANDS/ΜL (ref 0.6–4.47)
LYMPHOCYTES NFR BLD AUTO: 23 % (ref 14–44)
MCH RBC QN AUTO: 31.5 PG (ref 26.8–34.3)
MCHC RBC AUTO-ENTMCNC: 32.1 G/DL (ref 31.4–37.4)
MCV RBC AUTO: 98 FL (ref 82–98)
MONOCYTES # BLD AUTO: 0.95 THOUSAND/ΜL (ref 0.17–1.22)
MONOCYTES NFR BLD AUTO: 16 % (ref 4–12)
NEUTROPHILS # BLD AUTO: 3.37 THOUSANDS/ΜL (ref 1.85–7.62)
NEUTS SEG NFR BLD AUTO: 55 % (ref 43–75)
NRBC BLD AUTO-RTO: 0 /100 WBCS
PLATELET # BLD AUTO: 145 THOUSANDS/UL (ref 149–390)
PMV BLD AUTO: 9.4 FL (ref 8.9–12.7)
POTASSIUM SERPL-SCNC: 4.2 MMOL/L (ref 3.5–5.3)
PROT SERPL-MCNC: 7 G/DL (ref 6.4–8.2)
RBC # BLD AUTO: 4.06 MILLION/UL (ref 3.81–5.12)
SODIUM SERPL-SCNC: 140 MMOL/L (ref 136–145)
WBC # BLD AUTO: 6.03 THOUSAND/UL (ref 4.31–10.16)

## 2019-09-30 PROCEDURE — 86300 IMMUNOASSAY TUMOR CA 15-3: CPT | Performed by: INTERNAL MEDICINE

## 2019-09-30 PROCEDURE — 36415 COLL VENOUS BLD VENIPUNCTURE: CPT | Performed by: INTERNAL MEDICINE

## 2019-09-30 PROCEDURE — 80053 COMPREHEN METABOLIC PANEL: CPT | Performed by: INTERNAL MEDICINE

## 2019-09-30 PROCEDURE — 85025 COMPLETE CBC W/AUTO DIFF WBC: CPT | Performed by: INTERNAL MEDICINE

## 2019-10-02 ENCOUNTER — HOSPITAL ENCOUNTER (OUTPATIENT)
Dept: INFUSION CENTER | Facility: HOSPITAL | Age: 84
Discharge: HOME | End: 2019-10-02
Attending: INTERNAL MEDICINE
Payer: MEDICARE

## 2019-10-02 VITALS
DIASTOLIC BLOOD PRESSURE: 65 MMHG | OXYGEN SATURATION: 96 % | HEART RATE: 85 BPM | SYSTOLIC BLOOD PRESSURE: 130 MMHG | RESPIRATION RATE: 16 BRPM | TEMPERATURE: 97.9 F

## 2019-10-02 DIAGNOSIS — C50.411 MALIGNANT NEOPLASM OF UPPER-OUTER QUADRANT OF RIGHT BREAST IN FEMALE, ESTROGEN RECEPTOR POSITIVE (HCC): Primary | ICD-10-CM

## 2019-10-02 DIAGNOSIS — Z17.0 MALIGNANT NEOPLASM OF UPPER-OUTER QUADRANT OF RIGHT BREAST IN FEMALE, ESTROGEN RECEPTOR POSITIVE (HCC): Primary | ICD-10-CM

## 2019-10-02 PROCEDURE — 96402 CHEMO HORMON ANTINEOPL SQ/IM: CPT

## 2019-10-02 RX ORDER — LAMOTRIGINE 25 MG/1
500 TABLET ORAL ONCE
Status: CANCELLED | OUTPATIENT
Start: 2019-10-03

## 2019-10-02 RX ORDER — LAMOTRIGINE 25 MG/1
500 TABLET ORAL ONCE
Status: CANCELLED | OUTPATIENT
Start: 2019-10-30

## 2019-10-02 RX ORDER — LAMOTRIGINE 25 MG/1
500 TABLET ORAL ONCE
Status: COMPLETED | OUTPATIENT
Start: 2019-10-02 | End: 2019-10-02

## 2019-10-02 RX ADMIN — FULVESTRANT 500 MG: 50 INJECTION INTRAMUSCULAR at 12:18

## 2019-10-24 ENCOUNTER — APPOINTMENT (OUTPATIENT)
Dept: RADIOLOGY | Facility: CLINIC | Age: 84
End: 2019-10-24
Payer: MEDICARE

## 2019-10-24 ENCOUNTER — OFFICE VISIT (OUTPATIENT)
Dept: URGENT CARE | Facility: CLINIC | Age: 84
End: 2019-10-24
Payer: MEDICARE

## 2019-10-24 VITALS
TEMPERATURE: 96 F | DIASTOLIC BLOOD PRESSURE: 62 MMHG | HEART RATE: 78 BPM | SYSTOLIC BLOOD PRESSURE: 118 MMHG | RESPIRATION RATE: 18 BRPM
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
## 2019-10-24 PROCEDURE — 99213 OFFICE O/P EST LOW 20 MIN: CPT | Performed by: FAMILY MEDICINE

## 2019-10-24 PROCEDURE — 73030 X-RAY EXAM OF SHOULDER: CPT

## 2019-10-24 PROCEDURE — G0463 HOSPITAL OUTPT CLINIC VISIT: HCPCS | Performed by: FAMILY MEDICINE

## 2019-10-24 PROCEDURE — 73130 X-RAY EXAM OF HAND: CPT

## 2019-10-24 NOTE — PROGRESS NOTES
Assessment/Plan:      Diagnoses and all orders for this visit:    Fall, initial encounter  -     XR shoulder 2+ vw right; Future  -     XR hand 3+ vw right; Future          Subjective:     Patient ID: Emily Park is a 80 y o  female  The patient is a 66-year-old female with lymphedema of the right upper extremity  Apparently a couple of days ago she fell and landed on the right hand as well as her right the shoulder  She has a lymphedema sleeve on her right arm there is marked swelling of the hand  She is reluctant to abduct the shoulder due to pain  Review of Systems   Unable to perform ROS: Age         Objective:     Physical Exam   Constitutional: She appears well-developed and well-nourished  HENT:   Head: Normocephalic and atraumatic  Eyes: EOM are normal    Pulmonary/Chest: Effort normal    Musculoskeletal:   There is marked swelling of the right hand  There is some bruising about the right shoulder    She is reluctant to abduct it due to pain

## 2019-10-24 NOTE — PATIENT INSTRUCTIONS
The x-rays appear negative for fracture  They will be read officially by a radiologist   Ana Laura Divers will get a call back if they read them differently  Elevate your right arm as often as possible  Probably best to hold the sleeve until the swelling of the hand goes down  Use Tylenol for pain

## 2019-10-28 ENCOUNTER — APPOINTMENT (OUTPATIENT)
Dept: LAB | Facility: HOSPITAL | Age: 84
End: 2019-10-28
Attending: INTERNAL MEDICINE
Payer: MEDICARE

## 2019-10-30 ENCOUNTER — HOSPITAL ENCOUNTER (OUTPATIENT)
Dept: INFUSION CENTER | Facility: HOSPITAL | Age: 84
Discharge: HOME | End: 2019-10-30
Attending: INTERNAL MEDICINE
Payer: MEDICARE

## 2019-10-30 VITALS
HEART RATE: 88 BPM | SYSTOLIC BLOOD PRESSURE: 110 MMHG | RESPIRATION RATE: 18 BRPM | TEMPERATURE: 96.5 F | OXYGEN SATURATION: 96 % | DIASTOLIC BLOOD PRESSURE: 54 MMHG

## 2019-10-30 DIAGNOSIS — C50.411 MALIGNANT NEOPLASM OF UPPER-OUTER QUADRANT OF RIGHT BREAST IN FEMALE, ESTROGEN RECEPTOR POSITIVE (HCC): Primary | ICD-10-CM

## 2019-10-30 DIAGNOSIS — Z17.0 MALIGNANT NEOPLASM OF UPPER-OUTER QUADRANT OF RIGHT BREAST IN FEMALE, ESTROGEN RECEPTOR POSITIVE (HCC): Primary | ICD-10-CM

## 2019-10-30 PROCEDURE — 96402 CHEMO HORMON ANTINEOPL SQ/IM: CPT

## 2019-10-30 RX ORDER — LAMOTRIGINE 25 MG/1
500 TABLET ORAL ONCE
Status: CANCELLED | OUTPATIENT
Start: 2019-11-27

## 2019-10-30 RX ORDER — LAMOTRIGINE 25 MG/1
500 TABLET ORAL ONCE
Status: COMPLETED | OUTPATIENT
Start: 2019-10-30 | End: 2019-10-30

## 2019-10-30 RX ADMIN — FULVESTRANT 500 MG: 50 INJECTION INTRAMUSCULAR at 13:51

## 2019-10-31 ENCOUNTER — TELEPHONE (OUTPATIENT)
Dept: HEMATOLOGY ONCOLOGY | Facility: CLINIC | Age: 84
End: 2019-10-31

## 2019-10-31 NOTE — TELEPHONE ENCOUNTER
Patient called to office requesting additional faslodex appointments at the [facility] (which I will take care of)  She was also complaining of left leg pain with a pain level of 7  It woke her in the middle of the night, it bothers her when walking etc   'It hurts where I got the shot"  Please call to discuss

## 2019-10-31 NOTE — TELEPHONE ENCOUNTER
I spoke with patient she said she does not have any complaints about her leg  She did however want to confirm her infusion appointment for her Faslodex  Information given  Patient to report to PCP for any pain she experiences  Patient verbalized understanding

## 2019-11-11 ENCOUNTER — OFFICE VISIT (OUTPATIENT)
Dept: FAMILY MEDICINE CLINIC | Facility: HOSPITAL | Age: 84
End: 2019-11-11
Payer: MEDICARE

## 2019-11-11 ENCOUNTER — TELEPHONE (OUTPATIENT)
Dept: GASTROENTEROLOGY | Facility: CLINIC | Age: 84
End: 2019-11-11

## 2019-11-11 ENCOUNTER — APPOINTMENT (EMERGENCY)
Dept: RADIOLOGY | Facility: HOSPITAL | Age: 84
End: 2019-11-11
Payer: MEDICARE

## 2019-11-11 ENCOUNTER — HOSPITAL ENCOUNTER (EMERGENCY)
Facility: HOSPITAL | Age: 84
Discharge: HOME | End: 2019-11-11
Attending: EMERGENCY MEDICINE | Admitting: EMERGENCY MEDICINE
Payer: MEDICARE

## 2019-11-11 VITALS
TEMPERATURE: 93 F | SYSTOLIC BLOOD PRESSURE: 123 MMHG | OXYGEN SATURATION: 93 % | BODY MASS INDEX: 22.45 KG/M2 | RESPIRATION RATE: 18 BRPM | DIASTOLIC BLOOD PRESSURE: 64 MMHG | HEIGHT: 55 IN | HEART RATE: 82 BPM | WEIGHT: 97 LBS

## 2019-11-11 VITALS
WEIGHT: 97.6 LBS | HEART RATE: 84 BPM | SYSTOLIC BLOOD PRESSURE: 88 MMHG | BODY MASS INDEX: 22.68 KG/M2 | TEMPERATURE: 95.6 F

## 2019-11-11 DIAGNOSIS — I95.9 HYPOTENSION: ICD-10-CM

## 2019-11-11 DIAGNOSIS — R53.1 WEAKNESS: Primary | ICD-10-CM

## 2019-11-11 DIAGNOSIS — R53.1 WEAKNESS GENERALIZED: ICD-10-CM

## 2019-11-11 DIAGNOSIS — I10 ESSENTIAL HYPERTENSION: ICD-10-CM

## 2019-11-11 DIAGNOSIS — T68.XXXA HYPOTHERMIA: ICD-10-CM

## 2019-11-11 DIAGNOSIS — Z17.0 MALIGNANT NEOPLASM OF UPPER-OUTER QUADRANT OF RIGHT BREAST IN FEMALE, ESTROGEN RECEPTOR POSITIVE (HCC): ICD-10-CM

## 2019-11-11 DIAGNOSIS — I95.1 ORTHOSTASIS: Primary | ICD-10-CM

## 2019-11-11 DIAGNOSIS — E78.2 MIXED HYPERLIPIDEMIA: ICD-10-CM

## 2019-11-11 DIAGNOSIS — C50.919 METASTATIC BREAST CANCER (HCC): ICD-10-CM

## 2019-11-11 DIAGNOSIS — C50.411 MALIGNANT NEOPLASM OF UPPER-OUTER QUADRANT OF RIGHT BREAST IN FEMALE, ESTROGEN RECEPTOR POSITIVE (HCC): ICD-10-CM

## 2019-11-11 PROBLEM — K56.609 SMALL BOWEL OBSTRUCTION (HCC): Status: RESOLVED | Noted: 2019-04-18 | Resolved: 2019-11-11

## 2019-11-11 LAB
ALBUMIN SERPL BCP-MCNC: 2 G/DL (ref 3.5–5)
ALP SERPL-CCNC: 77 U/L (ref 46–116)
ALT SERPL W P-5'-P-CCNC: 36 U/L (ref 12–78)
ANION GAP SERPL CALCULATED.3IONS-SCNC: 9 MMOL/L (ref 4–13)
APTT PPP: 38 SECONDS (ref 23–37)
AST SERPL W P-5'-P-CCNC: 117 U/L (ref 5–45)
ATRIAL RATE: 576 BPM
BACTERIA UR QL AUTO: ABNORMAL /HPF
BASOPHILS # BLD AUTO: 0.08 THOUSANDS/ΜL (ref 0–0.1)
BASOPHILS NFR BLD AUTO: 1 % (ref 0–1)
BILIRUB SERPL-MCNC: 0.4 MG/DL (ref 0.2–1)
BILIRUB UR QL STRIP: NEGATIVE
BUN SERPL-MCNC: 22 MG/DL (ref 5–25)
CALCIUM SERPL-MCNC: 8.1 MG/DL (ref 8.3–10.1)
CHLORIDE SERPL-SCNC: 105 MMOL/L (ref 100–108)
CLARITY UR: ABNORMAL
CO2 SERPL-SCNC: 21 MMOL/L (ref 21–32)
COLOR UR: YELLOW
CREAT SERPL-MCNC: 1.72 MG/DL (ref 0.6–1.3)
EOSINOPHIL # BLD AUTO: 0.41 THOUSAND/ΜL (ref 0–0.61)
EOSINOPHIL NFR BLD AUTO: 7 % (ref 0–6)
ERYTHROCYTE [DISTWIDTH] IN BLOOD BY AUTOMATED COUNT: 14.9 % (ref 11.6–15.1)
FINE GRAN CASTS URNS QL MICRO: ABNORMAL /LPF
GFR SERPL CREATININE-BSD FRML MDRD: 25 ML/MIN/1.73SQ M
GLUCOSE SERPL-MCNC: 67 MG/DL (ref 65–140)
GLUCOSE UR STRIP-MCNC: NEGATIVE MG/DL
HCT VFR BLD AUTO: 36.6 % (ref 34.8–46.1)
HGB BLD-MCNC: 12 G/DL (ref 11.5–15.4)
HGB UR QL STRIP.AUTO: ABNORMAL
HYALINE CASTS #/AREA URNS LPF: ABNORMAL /LPF
IMM GRANULOCYTES # BLD AUTO: 0.04 THOUSAND/UL (ref 0–0.2)
IMM GRANULOCYTES NFR BLD AUTO: 1 % (ref 0–2)
INR PPP: 1.16 (ref 0.84–1.19)
KETONES UR STRIP-MCNC: NEGATIVE MG/DL
LACTATE SERPL-SCNC: 1.9 MMOL/L (ref 0.5–2)
LEUKOCYTE ESTERASE UR QL STRIP: NEGATIVE
LYMPHOCYTES # BLD AUTO: 1.73 THOUSANDS/ΜL (ref 0.6–4.47)
LYMPHOCYTES NFR BLD AUTO: 29 % (ref 14–44)
MAGNESIUM SERPL-MCNC: 1.9 MG/DL (ref 1.6–2.6)
MCH RBC QN AUTO: 31.3 PG (ref 26.8–34.3)
MCHC RBC AUTO-ENTMCNC: 32.8 G/DL (ref 31.4–37.4)
MCV RBC AUTO: 96 FL (ref 82–98)
MONOCYTES # BLD AUTO: 0.84 THOUSAND/ΜL (ref 0.17–1.22)
MONOCYTES NFR BLD AUTO: 14 % (ref 4–12)
NEUTROPHILS # BLD AUTO: 2.8 THOUSANDS/ΜL (ref 1.85–7.62)
NEUTS SEG NFR BLD AUTO: 48 % (ref 43–75)
NITRITE UR QL STRIP: NEGATIVE
NON-SQ EPI CELLS URNS QL MICRO: ABNORMAL /HPF
NRBC BLD AUTO-RTO: 0 /100 WBCS
NT-PROBNP SERPL-MCNC: 4365 PG/ML
OTHER STN SPEC: ABNORMAL
PH UR STRIP.AUTO: 6 [PH]
PLATELET # BLD AUTO: 117 THOUSANDS/UL (ref 149–390)
PMV BLD AUTO: 9.2 FL (ref 8.9–12.7)
POTASSIUM SERPL-SCNC: 4.7 MMOL/L (ref 3.5–5.3)
PR INTERVAL: 194 MS
PROCALCITONIN SERPL-MCNC: 0.09 NG/ML
PROT SERPL-MCNC: 6.3 G/DL (ref 6.4–8.2)
PROT UR STRIP-MCNC: NEGATIVE MG/DL
PROTHROMBIN TIME: 14.5 SECONDS (ref 11.6–14.5)
QRS AXIS: -84 DEGREES
QRSD INTERVAL: 190 MS
QT INTERVAL: 500 MS
QTC INTERVAL: 598 MS
RBC # BLD AUTO: 3.83 MILLION/UL (ref 3.81–5.12)
RBC #/AREA URNS AUTO: ABNORMAL /HPF
SODIUM SERPL-SCNC: 135 MMOL/L (ref 136–145)
SP GR UR STRIP.AUTO: 1.02 (ref 1–1.03)
T WAVE AXIS: 99 DEGREES
TROPONIN I SERPL-MCNC: <0.02 NG/ML
UROBILINOGEN UR QL STRIP.AUTO: 0.2 E.U./DL
VENTRICULAR RATE: 86 BPM
WBC # BLD AUTO: 5.9 THOUSAND/UL (ref 4.31–10.16)
WBC #/AREA URNS AUTO: ABNORMAL /HPF

## 2019-11-11 PROCEDURE — 85730 THROMBOPLASTIN TIME PARTIAL: CPT | Performed by: PHYSICIAN ASSISTANT

## 2019-11-11 PROCEDURE — 84145 PROCALCITONIN (PCT): CPT | Performed by: PHYSICIAN ASSISTANT

## 2019-11-11 PROCEDURE — 71045 X-RAY EXAM CHEST 1 VIEW: CPT

## 2019-11-11 PROCEDURE — 99214 OFFICE O/P EST MOD 30 MIN: CPT | Performed by: FAMILY MEDICINE

## 2019-11-11 PROCEDURE — 93010 ELECTROCARDIOGRAM REPORT: CPT | Performed by: INTERNAL MEDICINE

## 2019-11-11 PROCEDURE — 83605 ASSAY OF LACTIC ACID: CPT | Performed by: PHYSICIAN ASSISTANT

## 2019-11-11 PROCEDURE — 36415 COLL VENOUS BLD VENIPUNCTURE: CPT | Performed by: PHYSICIAN ASSISTANT

## 2019-11-11 PROCEDURE — 84484 ASSAY OF TROPONIN QUANT: CPT | Performed by: PHYSICIAN ASSISTANT

## 2019-11-11 PROCEDURE — 85610 PROTHROMBIN TIME: CPT | Performed by: PHYSICIAN ASSISTANT

## 2019-11-11 PROCEDURE — 99285 EMERGENCY DEPT VISIT HI MDM: CPT | Performed by: PHYSICIAN ASSISTANT

## 2019-11-11 PROCEDURE — 83735 ASSAY OF MAGNESIUM: CPT | Performed by: PHYSICIAN ASSISTANT

## 2019-11-11 PROCEDURE — 81001 URINALYSIS AUTO W/SCOPE: CPT | Performed by: PHYSICIAN ASSISTANT

## 2019-11-11 PROCEDURE — 87040 BLOOD CULTURE FOR BACTERIA: CPT | Performed by: PHYSICIAN ASSISTANT

## 2019-11-11 PROCEDURE — 96360 HYDRATION IV INFUSION INIT: CPT

## 2019-11-11 PROCEDURE — 83880 ASSAY OF NATRIURETIC PEPTIDE: CPT | Performed by: PHYSICIAN ASSISTANT

## 2019-11-11 PROCEDURE — 80053 COMPREHEN METABOLIC PANEL: CPT | Performed by: PHYSICIAN ASSISTANT

## 2019-11-11 PROCEDURE — 85025 COMPLETE CBC W/AUTO DIFF WBC: CPT | Performed by: PHYSICIAN ASSISTANT

## 2019-11-11 PROCEDURE — 99285 EMERGENCY DEPT VISIT HI MDM: CPT

## 2019-11-11 PROCEDURE — 93005 ELECTROCARDIOGRAM TRACING: CPT

## 2019-11-11 PROCEDURE — 99497 ADVNCD CARE PLAN 30 MIN: CPT | Performed by: NURSE PRACTITIONER

## 2019-11-11 RX ORDER — SODIUM CHLORIDE 9 MG/ML
3 INJECTION INTRAVENOUS AS NEEDED
Status: DISCONTINUED | OUTPATIENT
Start: 2019-11-11 | End: 2019-11-11 | Stop reason: HOSPADM

## 2019-11-11 RX ADMIN — SODIUM CHLORIDE 1000 ML: 0.9 INJECTION, SOLUTION INTRAVENOUS at 11:00

## 2019-11-11 RX ADMIN — SODIUM CHLORIDE 1000 ML: 0.9 INJECTION, SOLUTION INTRAVENOUS at 09:58

## 2019-11-11 NOTE — TELEPHONE ENCOUNTER
Pt's daughter Noa Jansen left  mssg stating she is cancelling her Mom's OV today; she is in the ER at [facility]  Notified Reception

## 2019-11-11 NOTE — DISCHARGE INSTRUCTIONS
Rest, increase fluids    [facility] [address] [facility] [place],Norberto MURILLO

## 2019-11-11 NOTE — SOCIAL WORK
Received case management consult re: hospice at home  Call placed to Pt's dtr(Claudette: [phone]), Pt's dtr informed  that she is in agreement with hospice at home but inquired if Pt can stay in the hospital for a day or two   informed Pt's dtr that per the Doctor, there was not a medical reason to admit Pt to the medical floor at this time  Pt's dtr wanted  to follow up with Pt re: hospice  Met with Pt and Pt's niece and sister at bedside  Pt in agreement for hospice  Theresa of Choice given for hospice agency  Pt, Pt's niece and sister are choosing [address] [facility]   informed Pt and Pt's niece and sister that per the doctor, Pt did not meet criteria for admission to the medical floor  Pt's sister and niece inquired about hospice house instead of home with hospice   informed that referral will be made for [facility] hospice liaison to [facility] but at this time the ED doctor did not think Pt met for hospice house criteria  Pt's niece and sister inquired about Pt going into nursing home with hospice   explained to Pt's niece and sister that Pt's insurance would cover for hospice in nursing home but room and board in nursing home will be private pay  Pt's niece inquired about cost of room and board and if [facility] would have bed available  Pt's niece also requested  to discuss with hospice at nursing home with Pt's dtr  Referral was sent to [id number] [place] [address] via [facility]  Call placed to Ruth in admissions at [facility] re: bed availability  Ruth informed  that there are currently no female beds at facility but it will be $137/FIP and a application will need to be completed  Call placed to Pt's dtr(Claudette)  informed Pt's dtr that for hospice in nursing home, it would be private pay for room and board  Pt's dtr informed that they cannot afford room and board for nursing home   Pt's dtr agreeable for Pt to return home with hospice  Met with Pt and Pt's sister at bedside  Pt and Pt's sister informed that [facility] not have bed availability at this time and cost for room and board  Pt's sister informed that she and Pt's niece will help Pt's dtr for hospice at home  Call placed to liaison at [facility], left message  Anticipate return call return

## 2019-11-11 NOTE — PROGRESS NOTES
Pastoral Care Progress Note    2019  Patient: Beata España : 8/10/1924  Admission Date & Time: 2019 6391  MRN: [id number] CSN: [id number]                     Chaplaincy Interventions Utilized:   Empowerment: Provided chaplaincy education    Exploration: Explored emotional needs & resources and Explored spiritual needs & resources    Collaboration: Consulted with interdisciplinary team    Relationship Building: Cultivated a relationship of care and support, Listened empathically and Provided silent and supportive presence    Ritual: Provided prayer            Chaplaincy Outcomes Achieved:  Arranged for community clergy surrogate          Spiritual Coping Strategies Utilized:   Connectedness and Spiritual comfort       [address] active in [id number] [place] [address] Rosita Davila Leader Aware/Contacted Leader/[employer] aware of patient's status   Spiritual Beliefs/Perceptions   Relationship with God Close   Stress Factors   Patient Stress Factors Exhausted; Health changes   Coping Responses   Patient Coping Accepting;Open/discussion   Plan of Care   Tyler Mcguire will Follow   (Patient's  to follow)   Assessment Completed by: Unit visit

## 2019-11-11 NOTE — ED PROVIDER NOTES
History  Chief Complaint   Patient presents with    Weakness - Generalized     patient presents to the ED with recommendation from PCP to be seen  patient states she has abdominal pain but states she has breast CA that has mets to her stomach  patient states she feels "cold" and just does not feel well  Patient is a 81 y/o F with h/o metastatic breast cancer, currently on faslodex, HTN that was brought to the ED by daughter for weakness for at least 4 days  She had an appointment with her PCP today and was found to be hypotensive and hypothermic and was sent to the ED for further evaluation  Patient states she has a mild cough occasionally  She has chronic abdominal pain from mets to stomach  She has nausea, no vomiting, but occasional diarrhea  No urinary symptoms  She has generalized weakness with frequent falls  She is DNR, but would like fluids and if necessary antibiotics  History provided by:  Patient and medical records  Fatigue   Severity:  Moderate  Onset quality:  Gradual  Duration:  4 days  Timing:  Constant  Progression:  Worsening  Chronicity:  New  Context: not recent infection    Relieved by:  Nothing  Worsened by:  Nothing  Ineffective treatments:  None tried  Associated symptoms: abdominal pain, anorexia, cough, diarrhea, difficulty walking, falls and nausea    Associated symptoms: no chest pain, no dysuria, no numbness in extremities, no fever, no shortness of breath, no stroke symptoms, no syncope and no vomiting    Risk factors: no new medications    Risk factors comment:  Metastatic breast cancer      Prior to Admission Medications   Prescriptions Last Dose Informant Patient Reported? Taking?    LORazepam (ATIVAN) 0 5 mg tablet   No No   Sig: Take 1 tablet (0 5 mg total) by mouth every 6 (six) hours as needed for anxiety for up to 2 days   Multiple Vitamins-Minerals (CENTRUM SILVER ADULT 50+ PO)  Self Yes No   Sig: Take by mouth   calcium citrate-vitamin D (CITRACAL+D) 315-200 MG-UNIT per tablet  Self Yes No   Sig: Take by mouth   fulvestrant (FASLODEX) 250 mg/5 mL  Self Yes No   Sig: Inject 500 mg into a muscle once   losartan (COZAAR) 50 mg tablet  Self No No   Sig: Take 1 tablet (50 mg total) by mouth daily   metoprolol tartrate (LOPRESSOR) 50 mg tablet  Self No No   Sig: Take 1 tablet (50 mg total) by mouth 2 (two) times a day   montelukast (SINGULAIR) 10 mg tablet  Self No No   Sig: Take 1 tablet (10 mg total) by mouth daily at bedtime   Patient not taking: Reported on 11/11/2019   nystatin (MYCOSTATIN) 100,000 units/mL suspension  Self No No   Sig: Apply 5 mL (500,000 Units total) to the mouth or throat 4 (four) times a day   Patient not taking: Reported on 11/11/2019   pantoprazole (PROTONIX) 40 mg tablet  Self No No   Sig: Take 1 tablet (40 mg total) by mouth 2 (two) times a day before meals   pravastatin (PRAVACHOL) 20 mg tablet  Self No No   Sig: Take 1 tablet (20 mg total) by mouth daily   zaleplon (SONATA) 5 MG capsule  Self No No   Sig: Take 1 capsule (5 mg total) by mouth daily at bedtime      Facility-Administered Medications Last Administration Doses Remaining   aluminum-magnesium hydroxide-simethicone (MYLANTA) 200-200-20 mg/5 mL oral suspension 30 mL 2/10/2018  2:06 PM    lidocaine viscous (XYLOCAINE) 2 % mucosal solution 15 mL 2/10/2018  2:07 PM           Past Medical History:   Diagnosis Date    AVM (arteriovenous malformation)     stomach    [facility] )     Cardiac pacemaker     Complete heart block (HCC)     Gastric ulcer with hemorrhage     GERD (gastroesophageal reflux disease)     Hearing loss     Heart block     S/P pacemaker     Herpes zoster     last assessed 7/30/13    Hyperlipidemia     Hypertension     Insomnia     last assessed 7/30/13, resolved 6/16/15    Lyme disease     last assessed 7/1/13    Malignant [facility] of cervix ([facility] )     last assessed 6/27/12, resolved 2/1/17     Osteoporosis     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 ([facility] )     last assessed 6/27/12    Use of anastrozole (Arimidex)     took x 5yrs, last assessed 11/19/17        Past Surgical History:   Procedure Laterality Date    BREAST BIOPSY  10/05/2009    percutaneous needle core    BREAST SURGERY Right 11/13/2009    Lumpectomy     CARDIAC PACEMAKER PLACEMENT      dual chamber, last assessed 2/26/16    CATARACT EXTRACTION, BILATERAL      DENTAL SURGERY      ESOPHAGOGASTRODUODENOSCOPY N/A 2/3/2017    Procedure: ESOPHAGOGASTRODUODENOSCOPY (EGD): FOREIGN BODY REMOVAL;  Surgeon: Ebony Houser MD;  [facility];  Service:     ESOPHAGOGASTRODUODENOSCOPY N/A 8/2/2018    Procedure: ESOPHAGOGASTRODUODENOSCOPY (EGD); Surgeon: Ebony Houser MD;  [facility];  Service: Gastroenterology    HERNIA REPAIR Right 01/28/2015    Inguinal, with mesh    HERNIA REPAIR Right 4/19/2019    Procedure: Repair of right incarcerated femoral hernia    ; Surgeon: Toña Campoverde MD;  [facility];  Service: General    HYSTERECTOMY      KNEE SURGERY      resolved 1999    LYMPHADENECTOMY  11/13/2009    Axillary     [place] ESOPHAGOGASTRODUODENOSCOPY TRANSORAL DIAGNOSTIC N/A 11/7/2018    Procedure: ESOPHAGOGASTRODUODENOSCOPY (EGD); Surgeon: Ebony Houser MD;  [facility];  Service: Gastroenterology    SENTINEL LYMPH NODE BIOPSY Right 11/13/2009       Family History   Problem Relation Age of Onset    Heart disease Mother     Heart disease Father      I have reviewed and agree with the history as documented      Social History     Tobacco Use    Smoking status: Former Smoker     Packs/day: 0 50     Years: 40 00     Pack years: 20 00    Smokeless tobacco: Never Used    Tobacco comment: quit 40 yrs ago   Substance Use Topics    Alcohol use: No     Alcohol/week: 0 0 standard drinks     Frequency: Never     Drinks per session: Patient refused     Binge frequency: Never     Comment: n/a    Drug use: No        Review of Systems   Constitutional: Positive for appetite change and fatigue  Negative for fever  HENT: Negative  Respiratory: Positive for cough  Negative for shortness of breath  Cardiovascular: Positive for leg swelling  Negative for chest pain and syncope  Gastrointestinal: Positive for abdominal pain, anorexia, diarrhea and nausea  Negative for vomiting  Genitourinary: Negative for dysuria  Musculoskeletal: Positive for falls  Neurological: Positive for weakness  Psychiatric/Behavioral: Negative for confusion  All other systems reviewed and are negative  Physical Exam  Physical Exam   Constitutional: She is oriented to person, place, and time  She appears cachectic  She appears ill  Patient is drowsy   HENT:   Head: Normocephalic and atraumatic  Right Ear: Decreased hearing is noted  Left Ear: Decreased hearing is noted  Nose: Nose normal    Mouth/Throat: Oropharynx is clear and moist and mucous membranes are normal  She has dentures  Eyes: Pupils are equal, round, and reactive to light  Conjunctivae are normal    Neck: Normal range of motion  Cardiovascular: Normal rate and regular rhythm  Exam reveals distant heart sounds  Trace pitting edema B/L LE  Pulmonary/Chest: Effort normal and breath sounds normal  She has no wheezes  She has no rhonchi  She has no rales  Abdominal: Soft  Normal appearance and bowel sounds are normal  There is generalized tenderness  There is no rigidity, no rebound and no guarding  Musculoskeletal: Normal range of motion  Right hand edema   Neurological: She is alert and oriented to person, place, and time  She has normal strength  No sensory deficit  Skin: Skin is warm and dry  No rash noted  She is not diaphoretic  There is pallor  Nursing note and vitals reviewed        Vital Signs  ED Triage Vitals [11/11/19 0958]   Temperature Pulse Respirations Blood Pressure SpO2   (!) 92 1 °F (33 4 °C) 87 20 101/58 93 %      Temp Source Heart Rate Source Patient Position - Orthostatic VS BP Location FiO2 (%) Rectal Monitor Lying Left arm --      Pain Score       --           Vitals:    11/11/19 1330 11/11/19 1345 11/11/19 1400 11/11/19 1430   BP: 109/59 102/52 99/53 123/64   Pulse: 84 85 80 82   Patient Position - Orthostatic VS: Lying Lying Lying Lying         Visual Acuity  Visual Acuity      Most Recent Value   L Pupil Size (mm)  2   R Pupil Size (mm)  2          ED Medications  Medications   sodium chloride (PF) 0 9 % injection 3 mL (has no administration in time range)   sodium chloride 0 9 % bolus 1,000 mL (0 mL Intravenous Stopped 11/11/19 1105)   sodium chloride 0 9 % bolus 1,000 mL (0 mL Intravenous Stopped 11/11/19 1117)       Diagnostic Studies  Results Reviewed     Procedure Component Value Units Date/Time    Blood culture #2 [[id number]] Collected:  11/11/19 1005    Lab Status:  Preliminary result Specimen:  Blood from Arm, Left Updated:  11/11/19 1402     Blood Culture Received in Microbiology Lab  Culture in Progress  Blood culture #1 [[id number]] Collected:  11/11/19 1005    Lab Status:  Preliminary result Specimen:  Blood from Arm, Left Updated:  11/11/19 1402     Blood Culture Received in Microbiology Lab  Culture in Progress      Procalcitonin [[id number]]  (Normal) Collected:  11/11/19 1005    Lab Status:  Final result Specimen:  Blood from Arm, Left Updated:  11/11/19 1335     Procalcitonin 0 09 ng/ml     Urine Microscopic [[id number]]  (Abnormal) Collected:  11/11/19 1102    Lab Status:  Final result Specimen:  Urine, Straight Cath Updated:  11/11/19 1209     RBC, UA Innumerable /hpf      WBC, UA 1-2 /hpf      Epithelial Cells None Seen /hpf      Bacteria, UA Occasional /hpf      Hyaline Casts, UA 10-20 /lpf      Fine granular casts 3-4 /lpf      OTHER OBSERVATIONS Renal Epithelial Cells Present  Renal Tubule Epithelial Cells Present    UA w Reflex to Microscopic w Reflex to Culture [[id number]]  (Abnormal) Collected:  11/11/19 1102    Lab Status:  Final result Specimen:  Urine, Straight Cath Updated:  11/11/19 1151     Color, UA Yellow     Clarity, UA Slightly Cloudy     Specific [place], UA 1 020     pH, UA 6 0     Leukocytes, UA Negative     Nitrite, UA Negative     Protein, UA Negative mg/dl      Glucose, UA Negative mg/dl      Ketones, UA Negative mg/dl      Urobilinogen, UA 0 2 E U /dl      Bilirubin, UA Negative     Blood, UA Large    Protime-INR [[id number]]  (Normal) Collected:  11/11/19 1005    Lab Status:  Final result Specimen:  Blood from Arm, Left Updated:  11/11/19 1059     Protime 14 5 seconds      INR 1 16    APTT [[id number]]  (Abnormal) Collected:  11/11/19 1005    Lab Status:  Final result Specimen:  Blood from Arm, Left Updated:  11/11/19 1059     PTT 38 seconds     Lactic acid x2 [[id number]]  (Normal) Collected:  11/11/19 1005    Lab Status:  Final result Specimen:  Blood from Arm, Left Updated:  11/11/19 1042     LACTIC ACID 1 9 mmol/L     Narrative:       Result may be elevated if tourniquet was used during collection      NT-BNP PRO [[id number]]  (Abnormal) Collected:  11/11/19 1005    Lab Status:  Final result Specimen:  Blood from Arm, Left Updated:  11/11/19 1042     NT-proBNP 4,365 pg/mL     Troponin I [[id number]]  (Normal) Collected:  11/11/19 1005    Lab Status:  Final result Specimen:  Blood from Arm, Left Updated:  11/11/19 1039     Troponin I <0 02 ng/mL     Comprehensive metabolic panel [[id number]]  (Abnormal) Collected:  11/11/19 1005    Lab Status:  Final result Specimen:  Blood from Arm, Left Updated:  11/11/19 1035     Sodium 135 mmol/L      Potassium 4 7 mmol/L      Chloride 105 mmol/L      CO2 21 mmol/L      ANION GAP 9 mmol/L      BUN 22 mg/dL      Creatinine 1 72 mg/dL      Glucose 67 mg/dL      Calcium 8 1 mg/dL       U/L      ALT 36 U/L      Alkaline Phosphatase 77 U/L      Total Protein 6 3 g/dL      Albumin 2 0 g/dL      Total Bilirubin 0 40 mg/dL      eGFR 25 ml/min/1 73sq m     Narrative:       [place] guidelines for Chronic Kidney Disease (CKD):     Stage 1 with normal or high GFR (GFR > 90 mL/min/1 73 square meters)    Stage 2 Mild CKD (GFR = 60-89 mL/min/1 73 square meters)    Stage 3A Moderate CKD (GFR = 45-59 mL/min/1 73 square meters)    Stage 3B Moderate CKD (GFR = 30-44 mL/min/1 73 square meters)    Stage 4 Severe CKD (GFR = 15-29 mL/min/1 73 square meters)    Stage 5 End Stage CKD (GFR <15 mL/min/1 73 square meters)  Note: GFR calculation is accurate only with a steady state creatinine    Magnesium [[id number]]  (Normal) Collected:  11/11/19 1005    Lab Status:  Final result Specimen:  Blood from Arm, Left Updated:  11/11/19 1035     Magnesium 1 9 mg/dL     CBC and differential [[id number]]  (Abnormal) Collected:  11/11/19 1005    Lab Status:  Final result Specimen:  Blood from Arm, Left Updated:  11/11/19 1021     WBC 5 90 Thousand/uL      RBC 3 83 Million/uL      Hemoglobin 12 0 g/dL      Hematocrit 36 6 %      MCV 96 fL      MCH 31 3 pg      MCHC 32 8 g/dL      RDW 14 9 %      MPV 9 2 fL      Platelets 571 Thousands/uL      nRBC 0 /100 WBCs      Neutrophils Relative 48 %      Immat GRANS % 1 %      Lymphocytes Relative 29 %      Monocytes Relative 14 %      Eosinophils Relative 7 %      Basophils Relative 1 %      Neutrophils Absolute 2 80 Thousands/µL      Immature Grans Absolute 0 04 Thousand/uL      Lymphocytes Absolute 1 73 Thousands/µL      Monocytes Absolute 0 84 Thousand/µL      Eosinophils Absolute 0 41 Thousand/µL      Basophils Absolute 0 08 Thousands/µL                  XR chest portable   Final Result by Chacho Rucker MD (11/11 1047)      Small left and trace right effusion        Workstation performed: [id number]                    Procedures  ECG 12 Lead Documentation Only  Date/Time: 11/11/2019 10:08 AM  Performed by: Jesi Webb PA-C  Authorized by: Jesi Webb PA-C     Indications / Diagnosis:  Weakness  ECG reviewed by me, the ED Provider: yes    Patient location:  ED  Previous ECG: Previous ECG:  Compared to current    Similarity:  No change  Rate:     ECG rate:  86  Rhythm:     Rhythm: paced    Pacing:     Type of pacing:  AV           ED Course  ED Course as of Nov 11 1612   Mon Nov 11, 2019   1108 Janet Castano in room currently evaluating patient  Lisa 64 spoke with patient and daughter, they would prefer hospice at this time  Will consult hospice  [address] [place] [address] Rd paged  Troy Andrews with [facility], she states she will be down shortly to see patient  36 SPoke with patient and sister  We are awaiting a call from  hospice  Patient and sister aware that being put on hospice means that she is comfort measures only  Esteban Dimas 144 with Donaldo Torres from [facility]  SHe states hospice will meet at the house at [address],[place] tomorrow  Initial Sepsis Screening     Row Name 11/11/19 1101                Is the patient's history suggestive of a new or worsening infection? (!) Yes (Proceed)  -CD        Suspected source of infection  suspect infection, source unknown  -CD        Are two or more of the following signs & symptoms of infection both present and new to the patient? No  -CD        Indicate SIRS criteria  Hypothermia < 36C (96 8F)  -CD        If the answer is yes to both questions, suspicion of sepsis is present          If severe sepsis is present AND tissue hypoperfusion perists in the hour after fluid resuscitation or lactate > 4, the patient meets criteria for SEPTIC SHOCK          Are any of the following organ dysfunction criteria present within 6 hours of suspected infection and SIRS criteria that are NOT considered to be chronic conditions?   No  -CD        Organ dysfunction          Date of presentation of severe sepsis          Time of presentation of severe sepsis          Tissue hypoperfusion persists in the hour after crystalloid fluid administration, evidenced, by either:          Was hypotension present within one hour of the conclusion of crystalloid fluid administration?         Date of presentation of septic shock          Time of presentation of septic shock            User Key  (r) = Recorded By, (t) = Taken By, (c) = Cosigned By    [address] Name Provider Type    SHANNON Rebolledo PA-C Physician Assistant                  MDM  Number of Diagnoses or Management Options  Hypotension: established and worsening  Hypothermia: established and worsening  Metastatic breast cancer [facility]): established and worsening  Weakness: established and worsening  Diagnosis management comments: Patient with hypotension, hypothermia, will order labs, CXR to r/o sepsis  Patient requesting hospice upon talking to Critical care  Case management consulted  Hospice will go to patient's house tomorrow  Family agrees with plan  Amount and/or Complexity of Data Reviewed  Clinical lab tests: ordered and reviewed  Tests in the radiology section of CPT®: ordered and reviewed  Discuss the patient with other providers: yes    Patient Progress  Patient progress: stable      Disposition  Final diagnoses:   Weakness   Metastatic breast cancer ([facility] )   Hypotension   Hypothermia     Time reflects when diagnosis was documented in both MDM as applicable and the Disposition within this note     Time User Action Codes Description Comment    11/11/2019  2:28 PM Moses Andujar [R53 1] Weakness     11/11/2019  2:29 PM Kristine Dimmer Metastatic breast cancer ([facility] )     11/11/2019  2:29 PM Moses Herrera Add [I95 9] Hypotension     11/11/2019  2:29 PM [address]vd  XXXA] Hypothermia       ED Disposition     ED Disposition Condition Date/Time Comment    Discharge Stable Mon Nov 11, 2019  2:28 PM Beata España discharge to home/self care              Follow-up Information     Follow up With Specialties Details Why Contact Info    hospice care   will be at your house tomorrow at [address],[facility]            Discharge Medication List as of 11/11/2019  2:38 PM      CONTINUE these medications which have NOT CHANGED    Details   calcium citrate-vitamin D (CITRACAL+D) 315-200 MG-UNIT per tablet Take by mouth, Historical Med      fulvestrant (FASLODEX) 250 mg/5 mL Inject 500 mg into a muscle once, Historical Med      LORazepam (ATIVAN) 0 5 mg tablet Take 1 tablet (0 5 mg total) by mouth every 6 (six) hours as needed for anxiety for up to 2 days, Starting Wed 8/8/2018, Until Mon 11/11/2019, Print      losartan (COZAAR) 50 mg tablet Take 1 tablet (50 mg total) by mouth daily, Starting Tue 6/11/2019, Normal      metoprolol tartrate (LOPRESSOR) 50 mg tablet Take 1 tablet (50 mg total) by mouth 2 (two) times a day, Starting Tue 6/11/2019, Normal      montelukast (SINGULAIR) 10 mg tablet Take 1 tablet (10 mg total) by mouth daily at bedtime, Starting Thu 12/27/2018, Normal      Multiple Vitamins-Minerals (CENTRUM SILVER ADULT 50+ PO) Take by mouth, Historical Med      nystatin (MYCOSTATIN) 100,000 units/mL suspension Apply 5 mL (500,000 Units total) to the mouth or throat 4 (four) times a day, Starting Wed 11/7/2018, Normal      pantoprazole (PROTONIX) 40 mg tablet Take 1 tablet (40 mg total) by mouth 2 (two) times a day before meals, Starting Tue 6/11/2019, Normal      pravastatin (PRAVACHOL) 20 mg tablet Take 1 tablet (20 mg total) by mouth daily, Starting Wed 3/6/2019, Normal      zaleplon (SONATA) 5 MG capsule Take 1 capsule (5 mg total) by mouth daily at bedtime, Starting Wed 4/17/2019, Normal           No discharge procedures on file      ED Provider  Electronically Signed by           Lisbeth Cota PA-C  11/11/19 7428

## 2019-11-11 NOTE — PROGRESS NOTES
Assessment/Plan:         Diagnoses and all orders for this visit:    Orthostasis  Comments:  Patient with signs of hypoperfusion from hypotension  Evaluate for sepsis  Advised ER evaluation now  Weakness generalized  Comments:  Progressive decline in strength and awareness, must evaluate for sepsis  Malignant neoplasm of upper-outer quadrant of right breast in female, estrogen receptor positive ([address] )    Mixed hyperlipidemia    Essential hypertension          Subjective:      Patient ID: Deepak Madison is a 80 y o  female  Has multiple problems and feels terrible    Feels poorly with eating, gets chest pain with eating and also diarrhea  Had several falls  Seen in Urgent Care for R shoulder injury  Daughter with whom she lives is very concerned because she is getting progressively weaker    She also feels SOB much of the time      The following portions of the patient's history were reviewed and updated as appropriate: allergies, current medications, past family history, past medical history, past social history, past surgical history and problem list     Review of Systems   Constitutional: Positive for appetite change, fatigue and unexpected weight change  HENT: Negative  Respiratory: Positive for choking, chest tightness and shortness of breath  Negative for wheezing  Cardiovascular: Positive for leg swelling  Gastrointestinal: Positive for abdominal pain  Genitourinary: Negative  Musculoskeletal: Positive for arthralgias, back pain and gait problem  Neurological: Positive for light-headedness  Negative for headaches  Hematological: Negative  Psychiatric/Behavioral: Negative  All other systems reviewed and are negative  Objective:      BP (!) 88/0 (BP Location: Left arm, Patient Position: Sitting, Cuff Size: Child)   Pulse 84   Temp (!) 95 6 °F (35 3 °C)   Wt 44 3 kg (97 lb 9 6 oz)   BMI 22 68 kg/m²          Physical Exam   Constitutional: She appears listless  Non-toxic appearance  She appears ill  She appears distressed  HENT:   Head: Normocephalic  Cardiovascular: Normal rate  Exam reveals distant heart sounds and decreased pulses  Pulmonary/Chest: She has decreased breath sounds  Musculoskeletal: She exhibits edema  Neurological: She appears listless  Skin: There is pallor  Hands are cold and fingers are mottled both hands   Nursing note and vitals reviewed

## 2019-11-11 NOTE — SOCIAL WORK
Continue to follow  Call received from Jovanna Ramon, liaison for [facility] informed  that she will be meeting with Pt and Pt's dtr tomorrow morning at 11:00am at Pt and Pt's dtr's [place]

## 2019-11-11 NOTE — ACP (ADVANCE CARE PLANNING)
Received tiger text from JOHN Ibrahim for possible admission from ED to critical care due to hypotension and hypothermia  Reviewed patients chart, which reveals recurrent breast Ca with mets to stomach  As per most recent Oncology note, 9/25 patient did not wish to have aggressive treatments and wanted to remain on her Faslodex and a DNR/DNI  Evaluated patient who was a pleasant but [place], occasionally forgetful but alert and oriented x 1 80year old and discussed multiple options for treatment of current ongoing issues as well as possible admission to the ICU for treatment  The patient expresses feeling tired of being tired and states that she is "ready whenever God wants to take her"  Discussed comfort care and hospice with patient, she was agreeable as she just did not want to be uncomfortable anymore  Explained that all current treatments would be stopped and the main focus would be on quality of life, patient stated that this sounded very good  Called daughter Molly Haro who lives with the patient to discuss ongoing conversation with her mother  Claudette was tearful but states she would want whatever her mother wants as she can only imagine how tired of all this she must be and she does not want her to suffer  Advised Claudette that Hospice will be consulted and she will be notified of any updates  Claudette plans on coming in to see her mom in a little while  The patients  was made aware and will be coming to visit her  Hospital spiritual services were also made aware and visited the patient  Summarized conversation for patient, she once again agrees to remain comfortable and would like some black coffee at this time  Black coffee provided for patient, case management made aware and hospice consult is pending       Time spent with patient: 32 minutes

## 2019-11-11 NOTE — ED NOTES
PAC notified of hypotension  No further orders at this time  Patient remains [id number]  Denies current complaints  Will continue to monitor        Jasmeet Schultz RN  11/11/19 6548

## 2019-11-14 ENCOUNTER — TELEPHONE (OUTPATIENT)
Dept: FAMILY MEDICINE CLINIC | Facility: HOSPITAL | Age: 84
End: 2019-11-14

## 2019-11-14 ENCOUNTER — TELEPHONE (OUTPATIENT)
Dept: HEMATOLOGY ONCOLOGY | Facility: CLINIC | Age: 84
End: 2019-11-14

## 2019-11-14 NOTE — TELEPHONE ENCOUNTER
Patient admitted to [facility] and would like you to be the attending    Please let Valeria La know if this is ok

## 2019-11-14 NOTE — TELEPHONE ENCOUNTER
Claudette called to say that her mother was just put on Hospice and she has questions about the shots she gets, she said its very important that someone calls her back

## 2019-11-14 NOTE — TELEPHONE ENCOUNTER
Spoke with daughter claudette  Hospice is coming to house today for consult  Referral place by pcp  Please cancel all future appts with office and infusion  Thanks

## 2019-11-16 LAB
BACTERIA BLD CULT: NORMAL
BACTERIA BLD CULT: NORMAL

## 2019-11-19 ENCOUNTER — TELEPHONE (OUTPATIENT)
Dept: OTHER | Facility: OTHER | Age: 84
End: 2019-11-19

## 2020-02-07 NOTE — PROGRESS NOTES
ER status confirmed  Pt with no further questions at this Time Pt tolerated faslodex inj IM in B/l Glut with no adverse reactions  Identified on IC Thuy Team Member Plan A1c > 9 report    Eligible for Nurse Navigator Case Management in collaboration between [facility] and Insurance provider. Writer left message for patient.    Introductory letter sent.

## (undated) DEVICE — INTENDED FOR TISSUE SEPARATION, AND OTHER PROCEDURES THAT REQUIRE A SHARP SURGICAL BLADE TO PUNCTURE OR CUT.: Brand: BARD-PARKER SAFETY BLADES SIZE 15, STERILE

## (undated) DEVICE — SPONGE LAP 18 X 18 IN

## (undated) DEVICE — RETRIEVER RESCUENET 5.5 X 3CM

## (undated) DEVICE — BIPOLAR ELECTROHEMOSTASIS CATHETER: Brand: GOLD PROBE

## (undated) DEVICE — GLOVE SRG BIOGEL 6.5

## (undated) DEVICE — SYRINGE 50ML LL

## (undated) DEVICE — GLOVE SRG BIOGEL ECLIPSE 7.5

## (undated) DEVICE — SPECIMEN CONTAINER: Brand: CARDINAL HEALTH

## (undated) DEVICE — SUT VICRYL 3-0 SH 27 IN J416H

## (undated) DEVICE — 1200CC GUARDIAN II: Brand: GUARDIAN

## (undated) DEVICE — BIPOLAR ELECTROHEMOSTASIS CATHETER: Brand: INJECTION GOLD PROBE

## (undated) DEVICE — PAD GROUNDING ADULT

## (undated) DEVICE — SUT PROLENE 2-0 CT-2 30 IN 8411H

## (undated) DEVICE — BITE BLOCK ADULT 11FR OMNI BLOC

## (undated) DEVICE — SUT PROLENE 0 CT-1 30 IN 8424H

## (undated) DEVICE — CHLORAPREP HI-LITE 26ML ORANGE

## (undated) DEVICE — TUBING SUCTION 5MM X 12 FT

## (undated) DEVICE — PROXIMATE PLUS MD MULTI-DIRECTIONAL RELEASE SKIN STAPLERS CONTAINS 35 STAINLESS STEEL STAPLES APPROXIMATE CLOSED DIMENSIONS: 6.9MM X 3.9MM WIDE: Brand: PROXIMATE

## (undated) DEVICE — GLOVE INDICATOR PI UNDERGLOVE SZ 8 BLUE

## (undated) DEVICE — SCD SEQUENTIAL COMPRESSION COMFORT SLEEVE MEDIUM KNEE LENGTH: Brand: KENDALL SCD

## (undated) DEVICE — GLOVE INDICATOR PI UNDERGLOVE SZ 6.5 BLUE

## (undated) DEVICE — POOLE SUCTION HANDLE: Brand: CARDINAL HEALTH

## (undated) DEVICE — SYRINGE 30ML LL

## (undated) DEVICE — SUT VICRYL 2-0 SH 27 IN UNDYED J417H

## (undated) DEVICE — THE DISPOSABLE ROTH NET FOREIGN BODY STANDARD RETRIEVAL DEVICE IS USED IN THE ENDOSCOPIC RETRIEVAL OF FOREIGN BODY, FOOD BOLUS AND EXCISED TISSUE SUCH AS POLYPS.: Brand: ROTH NET

## (undated) DEVICE — WORKING LENGTH 235CM, WORKING CHANNEL 2.8MM: Brand: RESOLUTION 360 CLIP

## (undated) DEVICE — BETHLEHEM UNIVERSAL MINOR GEN: Brand: CARDINAL HEALTH

## (undated) DEVICE — ADHESIVE SKN CLSR HISTOACRYL FLEX 0.5ML LF

## (undated) DEVICE — SINGLE-USE POLYPECTOMY SNARE: Brand: SENSATION SHORT THROW

## (undated) DEVICE — 2000CC GUARDIAN II: Brand: GUARDIAN

## (undated) DEVICE — LUBRICANT SURGILUBE TUBE 4 OZ  FLIP TOP

## (undated) DEVICE — INTENDED FOR TISSUE SEPARATION, AND OTHER PROCEDURES THAT REQUIRE A SHARP SURGICAL BLADE TO PUNCTURE OR CUT.: Brand: BARD-PARKER SAFETY BLADES SIZE 10, STERILE